# Patient Record
Sex: FEMALE | Race: WHITE | NOT HISPANIC OR LATINO | Employment: UNEMPLOYED | URBAN - METROPOLITAN AREA
[De-identification: names, ages, dates, MRNs, and addresses within clinical notes are randomized per-mention and may not be internally consistent; named-entity substitution may affect disease eponyms.]

---
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
## 2020-05-15 ENCOUNTER — HISTORICAL (OUTPATIENT)
Dept: LAB | Facility: HOSPITAL | Age: 20
End: 2020-05-15

## 2020-08-20 LAB
BILIRUB SERPL-MCNC: NEGATIVE MG/DL
BLOOD URINE, POC: NORMAL
CLARITY, POC UA: NORMAL
COLOR, POC UA: NORMAL
GLUCOSE UR QL STRIP: NEGATIVE
KETONES UR QL STRIP: NEGATIVE
LEUKOCYTE EST, POC UA: NORMAL
NITRITE, POC UA: POSITIVE
PH, POC UA: 7
PROTEIN, POC: NORMAL
SPECIFIC GRAVITY, POC UA: 1.02
UROBILINOGEN, POC UA: NORMAL

## 2020-12-01 ENCOUNTER — HISTORICAL (OUTPATIENT)
Dept: LAB | Facility: HOSPITAL | Age: 20
End: 2020-12-01

## 2021-06-16 LAB
B-HCG FREE SERPL-ACNC: 65.03 MIU/ML
BILIRUB SERPL-MCNC: NEGATIVE MG/DL
BLOOD URINE, POC: NORMAL
CLARITY, POC UA: CLEAR
COLOR, POC UA: YELLOW
GLUCOSE UR QL STRIP: NEGATIVE
KETONES UR QL STRIP: NORMAL
LEUKOCYTE EST, POC UA: NEGATIVE
NITRITE, POC UA: NEGATIVE
PH, POC UA: 6.5
PROTEIN, POC: NORMAL
SPECIFIC GRAVITY, POC UA: NORMAL
UROBILINOGEN, POC UA: NORMAL

## 2022-04-09 ENCOUNTER — HISTORICAL (OUTPATIENT)
Dept: ADMINISTRATIVE | Facility: HOSPITAL | Age: 22
End: 2022-04-09

## 2022-04-29 VITALS
HEIGHT: 66 IN | DIASTOLIC BLOOD PRESSURE: 68 MMHG | OXYGEN SATURATION: 98 % | WEIGHT: 112.63 LBS | BODY MASS INDEX: 18.1 KG/M2 | SYSTOLIC BLOOD PRESSURE: 102 MMHG

## 2022-04-30 ENCOUNTER — HISTORICAL (OUTPATIENT)
Dept: ADMINISTRATIVE | Facility: HOSPITAL | Age: 22
End: 2022-04-30

## 2022-05-02 NOTE — HISTORICAL [facility]
This is a historical note converted from [facility]. Formatting and pictures may have been removed.  Please reference Carter for original formatting and attached multimedia. Chief Complaint  +upt w/ bleeding. new patient  History of Present Illness  21yo WF is a new patient in today for +upt w/ bleeding.  HCG on 06/14/21 of 75.68.  U/s stated Pregnancy of unknown location. Recommend follow-up beta hcg W/ Ultrasound as needed.  Patient started bleeding 06/13 to present. lmp: 04/07/21  Gynecological History  Last Menstrual Period: 04/07/21  Last Menstrual Period: 04/07/21  Menstrual Status Intake: Menarcheal  Menstrual Status Intake: Menarcheal  STIs/STDs: No  Abnormal Pap: No  Current Birth Control Method: Pregnant  Dyspareunia: No  Postcoital Bleeding: No  Dysuria: No  Discharge OB: none reported  Urinary Incontinence: none reported  Sexually Active: Yes  Review of Systems  General/Constitutional:  Chills?denies. Fatigue/weakness?denies?. Fever?denies?. Night sweats?denies?..?  Gastrointestinal:  Abdominal pain?denies?. Blood in stool?denies?. Constipation?denies?. Diarrhea?denies?. Heartburn?denies?. Nausea?denies?. Vomiting?denies?  Genitourinary:  Incontinence?denies?. Blood in urine?denies. Frequent urination?denies?. Painful urination?denies.  Gynecologic:  Irregular menses?admits. ?Heavy bleeding ?denies. ?Painful menses?denies. ?Vaginal discharge?denies?. Vaginal odor?denies. Vaginal lesion?denies. ?Pelvic pain?denies?. Decreased libido?denies. Vulvar lesion?denies?. Prolapse of genital organs?denies?. Painful intercourse?denies?.  Psychiatric:  Depression?denies. Anxiety?denies  Physical Exam  Vitals & Measurements  T:?36.7? ?C (Temporal Artery)? BP:?126/66?  HT:?167.00?cm? WT:?50.600?kg? BMI:?18.14? LMP:?04/07/2021 00:00 CDT? LMP:?04/07/2021 00:00 CDT?  General Exam:  ?  General Appearance:?alert, in no acute distress, normal, well nourished.  Psych:  Orientation:?time, place,  person.  Mood/Affect:?Normal?  Abdomen:  ?-?Soft.?Non-tender. ?No rebound tenderness or guardingNo masses. ? Liver?normal. ?Spleen?normal. ?No hernia palpable.  Pelvis:?  ?- Vulva:?Normal?  ?- Perianal skin:?Normal?  ?- Urethra:?Normal meatus. Q-tip:?Not performed?  ?- Vagina: ?NormalVaginal discharge present:?.?Cystocele:?Absent. ?Rectocele:?Absent?small blood present in vault  ?- Cervix:?Normal. Cervical motion tenderness?Absent?  ?- Uterus: Size -?Normal,?8 week size. ?Mobile.?Non-tender.?  ?- Adnexal:?Normal  ?  Assessment/Plan  1.?Threatened ?O20.0  ?Repeat HCG today  Discussed diagnosis of threatened ?with patient  Discussed contraception options, pt declines at this time. ?  ?  RTC 1 week for F/U HCG   OB History  Pregnancy History???(0,0,0,0)?? ??  ?  ??No previous pregnancies history have been recorded  Problem List/Past Medical History  Ongoing  Anxiety disorder  Sinus tachycardia by electrocardiogram  Threatened   Historical  No qualifying data  Medications  ondansetron 4 mg oral tablet, disintegrating, 4 mg= 1 tab(s), Oral, q8hr, PRN,? ?Not taking  Prenatal 1 Plus 1 (Pt. Own)  Allergies  No Known Allergies  Social History  Abuse/Neglect  No, No, Yes, 2021  No, 2021  Alcohol  Never, 2021  Sexual  Sexually active: Yes. Sexual orientation: Straight or heterosexual. Gender Identity Identifies as female., 2021  Substance Use  Never, 2021  Tobacco  Never (less than 100 in lifetime), No, 2021  Marital Status  Single  Family History  Primary malignant neoplasm of breast: Aunt.  Health Maintenance  Health Maintenance  ???Pending?(in the next year)  ??? ??OverDue  ??? ? ? ?Influenza Vaccine due??10/01/20??and every 1??day(s)  ??? ? ? ?TB Skin Test due??20??and every 1??year(s)  ??? ? ? ?Alcohol Misuse Screening due??21??and every 1??year(s)  ??? ??Due?  ??? ? ? ?Depression Screening due??21??Unknown Frequency  ??? ? ? ?Tetanus  Vaccine due??06/16/21??and every 10??year(s)  ??? ??Due In Future?  ??? ? ? ?ADL Screening not due until??08/20/21??and every 1??year(s)  ??? ? ? ?Obesity Screening not due until??01/01/22??and every 1??year(s)  ???Satisfied?(in the past 1 year)  ??? ??Satisfied?  ??? ? ? ?ADL Screening on??08/20/20.??Satisfied by Olivia Long  ??? ? ? ?Blood Pressure Screening on??06/16/21.??Satisfied by Nando Dennison  ??? ? ? ?Body Mass Index Check on??06/16/21.??Satisfied by Nando Dennison  ??? ? ? ?Obesity Screening on??06/16/21.??Satisfied by Nando Dennison  ?

## 2022-07-23 ENCOUNTER — HISTORICAL (OUTPATIENT)
Dept: ADMINISTRATIVE | Facility: HOSPITAL | Age: 22
End: 2022-07-23

## 2022-07-23 LAB
ABO + RH BLD: NORMAL
C TRACH DNA SPEC QL NAA+PROBE: NEGATIVE
N GONORRHOEA DNA SPEC QL NAA+PROBE: NEGATIVE
TRICHOMONAS: NEGATIVE

## 2022-07-25 LAB
HBV SURFACE AG SERPL QL IA: NONREACTIVE
HCT VFR BLD AUTO: 36.2 % (ref 36–46)
HCV AB SERPL QL IA: NEGATIVE
HEMOGLOBIN BANDS: NORMAL
HGB BLD-MCNC: 12.8 G/DL (ref 12–16)
HIV 1+2 AB+HIV1 P24 AG SERPL QL IA: NONREACTIVE
MCV RBC AUTO: 86.6 FL (ref 82–108)
PLATELET # BLD AUTO: 213 K/UL (ref 150–399)
T PALLIDUM AB SER QL: NONREACTIVE

## 2022-09-16 ENCOUNTER — HISTORICAL (OUTPATIENT)
Dept: ADMINISTRATIVE | Facility: HOSPITAL | Age: 22
End: 2022-09-16

## 2022-10-04 LAB
GLUCOSE SERPL-MCNC: 161 MG/DL
RUBELLA IMMUNE STATUS: NORMAL
URINE CULTURE, ROUTINE: NEGATIVE
VARICELLA ANTIBODY, IGG, CSF: NORMAL

## 2022-10-11 LAB
GLUCOSE, 1 HOUR: 185 MG/DL
GLUCOSE, 2 HOUR: 178 MG/DL
GLUCOSE, 3 HOUR: 144 MG/DL
GLUCOSE, FASTING: 82 MG/DL

## 2022-10-25 ENCOUNTER — HISTORICAL (OUTPATIENT)
Dept: ADMINISTRATIVE | Facility: HOSPITAL | Age: 22
End: 2022-10-25

## 2022-11-28 LAB
HCT VFR BLD AUTO: 29.6 % (ref 36–46)
HGB BLD-MCNC: 10.3 G/DL (ref 12–16)
MCV RBC AUTO: 91.1 FL (ref 82–108)
PLATELET # BLD AUTO: 195 K/UL (ref 150–399)

## 2023-01-12 VITALS — BODY MASS INDEX: 20.8 KG/M2 | WEIGHT: 127.88 LBS | SYSTOLIC BLOOD PRESSURE: 112 MMHG | DIASTOLIC BLOOD PRESSURE: 72 MMHG

## 2023-01-12 PROBLEM — F41.9 ANXIETY DISORDER: Status: ACTIVE | Noted: 2023-01-12

## 2023-01-12 PROBLEM — O20.0 THREATENED ABORTION: Status: ACTIVE | Noted: 2023-01-12

## 2023-01-12 PROBLEM — R00.0 SINUS TACHYCARDIA BY ELECTROCARDIOGRAPHY: Status: ACTIVE | Noted: 2023-01-12

## 2023-01-12 RX ORDER — DIMENHYDRINATE 50 MG
50 TABLET ORAL NIGHTLY PRN
COMMUNITY
End: 2023-04-26

## 2023-01-24 ENCOUNTER — ROUTINE PRENATAL (OUTPATIENT)
Dept: OBSTETRICS AND GYNECOLOGY | Facility: CLINIC | Age: 23
End: 2023-01-24
Payer: MEDICAID

## 2023-01-24 VITALS
SYSTOLIC BLOOD PRESSURE: 126 MMHG | WEIGHT: 130.31 LBS | DIASTOLIC BLOOD PRESSURE: 70 MMHG | BODY MASS INDEX: 21.19 KG/M2

## 2023-01-24 DIAGNOSIS — O24.410 DIET CONTROLLED GESTATIONAL DIABETES MELLITUS (GDM) IN THIRD TRIMESTER: Primary | ICD-10-CM

## 2023-01-24 DIAGNOSIS — Z3A.33 33 WEEKS GESTATION OF PREGNANCY: ICD-10-CM

## 2023-01-24 LAB
BILIRUB SERPL-MCNC: NORMAL MG/DL
BLOOD URINE, POC: NORMAL
CLARITY, POC UA: NORMAL
COLOR, POC UA: YELLOW
GLUCOSE UR QL STRIP: NEGATIVE
KETONES UR QL STRIP: NORMAL
LEUKOCYTE ESTERASE URINE, POC: NEGATIVE
NITRITE, POC UA: NEGATIVE
PH, POC UA: NORMAL
PROTEIN, POC: NEGATIVE
SPECIFIC GRAVITY, POC UA: NORMAL
UROBILINOGEN, POC UA: NORMAL

## 2023-01-24 PROCEDURE — 99214 PR OFFICE/OUTPT VISIT, EST, LEVL IV, 30-39 MIN: ICD-10-PCS | Mod: TH,,, | Performed by: OBSTETRICS & GYNECOLOGY

## 2023-01-24 PROCEDURE — 99214 OFFICE O/P EST MOD 30 MIN: CPT | Mod: TH,,, | Performed by: OBSTETRICS & GYNECOLOGY

## 2023-01-24 RX ORDER — LANCETS 30 GAUGE
EACH MISCELLANEOUS
Status: ON HOLD | COMMUNITY
Start: 2022-10-14 | End: 2023-03-08 | Stop reason: HOSPADM

## 2023-01-24 RX ORDER — BLOOD-GLUCOSE METER
EACH MISCELLANEOUS
Status: ON HOLD | COMMUNITY
Start: 2022-10-14 | End: 2023-03-08 | Stop reason: HOSPADM

## 2023-01-24 RX ORDER — ONDANSETRON 4 MG/1
4 TABLET, ORALLY DISINTEGRATING ORAL EVERY 6 HOURS PRN
COMMUNITY
Start: 2022-08-01 | End: 2023-01-24 | Stop reason: SDUPTHER

## 2023-01-24 RX ORDER — CALCIUM CITRATE/VITAMIN D3 200MG-6.25
1 TABLET ORAL 4 TIMES DAILY
COMMUNITY
Start: 2022-10-14 | End: 2023-02-23 | Stop reason: SDUPTHER

## 2023-01-24 NOTE — PROGRESS NOTES
HPI  22 y.o.  at 33w1d here for OB check.  DXT LOG: WNL expect  and  PP dinner-140,146.  Pt had peanut butter and jelly sandwich and boudin egg roll those days.      ROS  Review of Systems   Constitutional:  Negative for chills and fever.   Gastrointestinal:  Negative for abdominal pain, constipation and diarrhea.   Genitourinary:  Negative for flank pain, genital sores, pelvic pain, urgency, vaginal bleeding, vaginal discharge, vaginal pain, postcoital bleeding and vaginal odor.       OBJECTIVE  /70   Wt 59.1 kg (130 lb 4.7 oz)   LMP 2022   BMI 21.19 kg/m²     Gen: No distress  Abdomen: Gravid, non-tender  Extremities: No edema    FHT:130  FH:31cm    ASSESSMENT  1. Diet controlled gestational diabetes mellitus (GDM) in third trimester    2. 33 weeks gestation of pregnancy  - POCT URINE DIPSTICK WITHOUT MICROSCOPE        PLAN  Reviewed standard labor unit and kick count precautions.  Discussed pre-eclampsia precautions.  Discussed COVID-19 risks, social distancing, and isolation if respiratory symptoms develop.    Cont DXT log, ADA diet. Discussed dietary compliance    RTC 2 weeks

## 2023-02-06 ENCOUNTER — PATIENT MESSAGE (OUTPATIENT)
Dept: OTHER | Facility: OTHER | Age: 23
End: 2023-02-06
Payer: MEDICAID

## 2023-02-13 ENCOUNTER — ROUTINE PRENATAL (OUTPATIENT)
Dept: OBSTETRICS AND GYNECOLOGY | Facility: CLINIC | Age: 23
End: 2023-02-13
Payer: MEDICAID

## 2023-02-13 VITALS — WEIGHT: 136 LBS | DIASTOLIC BLOOD PRESSURE: 76 MMHG | BODY MASS INDEX: 22.12 KG/M2 | SYSTOLIC BLOOD PRESSURE: 126 MMHG

## 2023-02-13 DIAGNOSIS — O24.410 DIET CONTROLLED GESTATIONAL DIABETES MELLITUS (GDM) IN THIRD TRIMESTER: Primary | ICD-10-CM

## 2023-02-13 DIAGNOSIS — Z3A.36 36 WEEKS GESTATION OF PREGNANCY: ICD-10-CM

## 2023-02-13 PROBLEM — O24.419 GESTATIONAL DIABETES MELLITUS (GDM) IN THIRD TRIMESTER: Status: ACTIVE | Noted: 2023-02-13

## 2023-02-13 PROCEDURE — 99499 UNLISTED E&M SERVICE: CPT | Mod: ,,, | Performed by: OBSTETRICS & GYNECOLOGY

## 2023-02-13 PROCEDURE — 99499 POCT URINALYSIS, DIPSTICK, AUTOMATED, W/O SCOPE: ICD-10-PCS | Mod: ,,, | Performed by: OBSTETRICS & GYNECOLOGY

## 2023-02-13 PROCEDURE — 99214 OFFICE O/P EST MOD 30 MIN: CPT | Mod: TH,,, | Performed by: OBSTETRICS & GYNECOLOGY

## 2023-02-13 PROCEDURE — 99214 PR OFFICE/OUTPT VISIT, EST, LEVL IV, 30-39 MIN: ICD-10-PCS | Mod: TH,,, | Performed by: OBSTETRICS & GYNECOLOGY

## 2023-02-13 PROCEDURE — 87653 STREP B DNA AMP PROBE: CPT | Performed by: OBSTETRICS & GYNECOLOGY

## 2023-02-13 NOTE — PROGRESS NOTES
HPI  22 y.o.  at 36w0d here for OB check, pre admit.  Doing well with diet.       DXT LOG:  Fastin-81  2hr PP breakfast:   2hr PP lunch:  2hr PP dinner:        ROS  Review of Systems   Constitutional:  Negative for chills and fever.   Gastrointestinal:  Negative for abdominal pain, constipation and diarrhea.   Genitourinary:  Negative for flank pain, genital sores, pelvic pain, urgency, vaginal bleeding, vaginal discharge, vaginal pain, postcoital bleeding and vaginal odor.       OBJECTIVE  /76   Wt 61.7 kg (136 lb 0.4 oz)   LMP 2022   BMI 22.12 kg/m²     Gen: No distress  Abdomen: Gravid, non-tender  Extremities: No edema  Cervix: 0/0-3  FHT: 135  FH:36cm      ASSESSMENT  1. Diet controlled gestational diabetes mellitus (GDM) in third trimester    2. 36 weeks gestation of pregnancy  - POCT Urinalysis, Dipstick, Automated, W/O Scope  - Strep Group B by PCR; Future  - Strep Group B by PCR        PLAN  Reviewed standard labor unit and kick count precautions.  Discussed pre-eclampsia precautions.  Discussed COVID-19 risks, social distancing, and isolation if respiratory symptoms develop.      Discussed delivery process and plan.  Consent given for delivery.   Discussed labor unit, kick count, pre-eclampsia, rupture   membrane precautions.   GBS    Cont DXT log, ADA diet. Discussed dietary compliance    RTC 1 week ob check, growth scan

## 2023-02-15 LAB — STREP B PCR (OHS): NOT DETECTED

## 2023-02-23 ENCOUNTER — ROUTINE PRENATAL (OUTPATIENT)
Dept: OBSTETRICS AND GYNECOLOGY | Facility: CLINIC | Age: 23
End: 2023-02-23
Payer: MEDICAID

## 2023-02-23 VITALS
BODY MASS INDEX: 22.23 KG/M2 | DIASTOLIC BLOOD PRESSURE: 70 MMHG | WEIGHT: 136.69 LBS | SYSTOLIC BLOOD PRESSURE: 118 MMHG

## 2023-02-23 DIAGNOSIS — O24.410 DIET CONTROLLED GESTATIONAL DIABETES MELLITUS (GDM) IN THIRD TRIMESTER: Primary | ICD-10-CM

## 2023-02-23 DIAGNOSIS — Z3A.37 37 WEEKS GESTATION OF PREGNANCY: ICD-10-CM

## 2023-02-23 LAB
BILIRUB UR QL STRIP: NORMAL
BILIRUB UR QL STRIP: NORMAL
GLUCOSE UR QL STRIP: NEGATIVE
GLUCOSE UR QL STRIP: NORMAL
KETONES UR QL STRIP: NORMAL
KETONES UR QL STRIP: NORMAL
LEUKOCYTE ESTERASE UR QL STRIP: NEGATIVE
LEUKOCYTE ESTERASE UR QL STRIP: NORMAL
PH, POC UA: NORMAL
PH, POC UA: NORMAL
POC BLOOD, URINE: NORMAL
POC BLOOD, URINE: NORMAL
POC NITRATES, URINE: NEGATIVE
POC NITRATES, URINE: NORMAL
PROT UR QL STRIP: NEGATIVE
PROT UR QL STRIP: NORMAL
SP GR UR STRIP: NORMAL (ref 1–1.03)
SP GR UR STRIP: NORMAL (ref 1–1.03)
UROBILINOGEN UR STRIP-ACNC: NORMAL (ref 0.3–2.2)
UROBILINOGEN UR STRIP-ACNC: NORMAL (ref 0.3–2.2)

## 2023-02-23 PROCEDURE — 76816 PR  US,PREGNANT UTERUS,F/U,TRANSABD APP: ICD-10-PCS | Mod: ,,, | Performed by: OBSTETRICS & GYNECOLOGY

## 2023-02-23 PROCEDURE — 76816 OB US FOLLOW-UP PER FETUS: CPT | Mod: ,,, | Performed by: OBSTETRICS & GYNECOLOGY

## 2023-02-23 PROCEDURE — 76819 PR US, OB, FETAL BIOPHYSICAL, W/O NST: ICD-10-PCS | Mod: ,,, | Performed by: OBSTETRICS & GYNECOLOGY

## 2023-02-23 PROCEDURE — 99213 PR OFFICE/OUTPT VISIT, EST, LEVL III, 20-29 MIN: ICD-10-PCS | Mod: 25,TH,, | Performed by: OBSTETRICS & GYNECOLOGY

## 2023-02-23 PROCEDURE — 76819 FETAL BIOPHYS PROFIL W/O NST: CPT | Mod: ,,, | Performed by: OBSTETRICS & GYNECOLOGY

## 2023-02-23 PROCEDURE — 99213 OFFICE O/P EST LOW 20 MIN: CPT | Mod: 25,TH,, | Performed by: OBSTETRICS & GYNECOLOGY

## 2023-02-23 RX ORDER — CALCIUM CITRATE/VITAMIN D3 200MG-6.25
1 TABLET ORAL 4 TIMES DAILY
Qty: 120 STRIP | Refills: 1 | Status: ON HOLD | OUTPATIENT
Start: 2023-02-23 | End: 2023-03-08 | Stop reason: HOSPADM

## 2023-02-23 NOTE — PROGRESS NOTES
HPI  22 y.o.  at 37w3d here for OB check, growth scan.      ROS  Review of Systems   Constitutional:  Negative for chills and fever.   Gastrointestinal:  Negative for abdominal pain, constipation and diarrhea.   Genitourinary:  Negative for flank pain, genital sores, pelvic pain, urgency, vaginal bleeding, vaginal discharge, vaginal pain, postcoital bleeding and vaginal odor.       OBJECTIVE  /70   Wt 62 kg (136 lb 11 oz)   LMP 2022   BMI 22.23 kg/m²     Gen: No distress  Abdomen: Gravid, non-tender  Extremities: No edema  Cervix:   FHT:150s  FH:37cm    BPD: 38w3d, 88%tile  HC: 39w3d, 74%tile  AC: 35w4d, 15%tile  FL: 36w5d, 32%tile   EFW: 2968g, 36%tile  BPP:   LUIS ANGEL: 15.0    ASSESSMENT  1. Diet controlled gestational diabetes mellitus (GDM) in third trimester    2. 37 weeks gestation of pregnancy  - US OB/GYN Procedure (Viewpoint) - Extended List - Future; Future  - US OB/GYN Procedure (Viewpoint) - Extended List - Future  - POCT Urinalysis, Dipstick, Automated, W/O Scope        PLAN    Reviewed standard labor unit and kick count precautions.  Discussed pre-eclampsia precautions.  Discussed COVID-19 risks, social distancing, and isolation if respiratory symptoms develop.    Growth scan today  DXT log WNL   Cont DXT log, ADA diet.   RTC 1 week

## 2023-02-24 ENCOUNTER — TELEPHONE (OUTPATIENT)
Dept: OBSTETRICS AND GYNECOLOGY | Facility: CLINIC | Age: 23
End: 2023-02-24
Payer: MEDICAID

## 2023-02-24 ENCOUNTER — HOSPITAL ENCOUNTER (OUTPATIENT)
Facility: HOSPITAL | Age: 23
Discharge: HOME | End: 2023-02-24
Attending: OBSTETRICS & GYNECOLOGY | Admitting: OBSTETRICS & GYNECOLOGY
Payer: MEDICAID

## 2023-02-24 VITALS — SYSTOLIC BLOOD PRESSURE: 125 MMHG | HEART RATE: 116 BPM | DIASTOLIC BLOOD PRESSURE: 81 MMHG

## 2023-02-24 DIAGNOSIS — O47.9 IRREGULAR UTERINE CONTRACTIONS: ICD-10-CM

## 2023-02-24 LAB
APPEARANCE UR: CLEAR
BACTERIA #/AREA URNS AUTO: ABNORMAL /HPF
BILIRUB UR QL STRIP.AUTO: NEGATIVE MG/DL
COLOR UR AUTO: YELLOW
GLUCOSE UR QL STRIP.AUTO: NEGATIVE MG/DL
KETONES UR QL STRIP.AUTO: ABNORMAL MG/DL
LEUKOCYTE ESTERASE UR QL STRIP.AUTO: ABNORMAL UNIT/L
NITRITE UR QL STRIP.AUTO: NEGATIVE
PH UR STRIP.AUTO: 7 [PH]
PROT UR QL STRIP.AUTO: NEGATIVE MG/DL
RBC #/AREA URNS AUTO: ABNORMAL /HPF
RBC UR QL AUTO: ABNORMAL UNIT/L
SP GR UR STRIP.AUTO: <=1.005
SQUAMOUS #/AREA URNS AUTO: ABNORMAL /HPF
UROBILINOGEN UR STRIP-ACNC: 0.2 MG/DL
WBC #/AREA URNS AUTO: ABNORMAL /HPF

## 2023-02-24 PROCEDURE — 81001 URINALYSIS AUTO W/SCOPE: CPT | Performed by: OBSTETRICS & GYNECOLOGY

## 2023-02-24 PROCEDURE — 87088 URINE BACTERIA CULTURE: CPT | Performed by: OBSTETRICS & GYNECOLOGY

## 2023-02-24 RX ORDER — BUTORPHANOL TARTRATE 1 MG/ML
1 INJECTION INTRAMUSCULAR; INTRAVENOUS ONCE
Status: DISCONTINUED | OUTPATIENT
Start: 2023-02-24 | End: 2023-02-24 | Stop reason: HOSPADM

## 2023-02-24 RX ORDER — HYDROXYZINE PAMOATE 50 MG/1
50 CAPSULE ORAL ONCE
Status: DISCONTINUED | OUTPATIENT
Start: 2023-02-24 | End: 2023-02-24 | Stop reason: HOSPADM

## 2023-02-24 NOTE — TELEPHONE ENCOUNTER
Patient called c/o contractions lasting 30 seconds occurring every 6-7 minutes. Advised patient to go to  for evaluation. Patient voiced understanding.

## 2023-02-24 NOTE — PROGRESS NOTES
Observation status note-  Patient is a 22-year-old white female  2 para 0 AB1 at 37.4 weeks gestation who presented to labor and delivery complaining of passage of mucus plug plus irregular contractions.  She is been observed for 2 hours in labor and delivery.  She is having contractions at 2-4 minutes intervals which are mild to moderate in intensity.  Her only prenatal problem has been gestational diabetes.  The gestational diabetes has been diet controlled.  At present fetal heart tones are in the 150s with good mtcb-po-smpk variability.  Her cervical exam on admission and after 2 hours of observation remains long and fingertip.  Nitrazine test is negative.Patient is somewhat uncomfortable with the contractions.  We will give her 1 mg of stadol IM and 50 mg of Vistaril by mouth.  We will recheck her in an hour.  If there has been no change in her cervix, she can go home.  Impression-intrauterine pregnancy at 37.4 weeks gestation with probable O'Brien Cunningham contractions.  Plan okay to discharge home on labor precautions.

## 2023-02-24 NOTE — NURSING
PT PRESENTED TO  ON 2/24/23 AT 0950 COMPLAINING OF CONTRACTIONS 5 MINUTES APART STARTING AT 0600. PT WAS PLACED ON EFM, CERVICAL EXAM WAS PERFORMED AND UA COLLECTED. CERVIX 1/30/-2.     PT RECHECKED AT 1145, NO CERVICAL CHANGE NOTED    DR SINGER AT BEDSIDE AT 1215. NEW ORDERS NOTED.     Pt states she is not feeling her contractions anymore and would like to be discharged, pt refuses pain medication ordered. Pt discharged and ambulated out in no distress at 1240.

## 2023-02-26 ENCOUNTER — HOSPITAL ENCOUNTER (OUTPATIENT)
Facility: HOSPITAL | Age: 23
Discharge: HOME | End: 2023-02-26
Attending: OBSTETRICS & GYNECOLOGY | Admitting: OBSTETRICS & GYNECOLOGY
Payer: MEDICAID

## 2023-02-26 VITALS
RESPIRATION RATE: 18 BRPM | TEMPERATURE: 99 F | SYSTOLIC BLOOD PRESSURE: 102 MMHG | HEART RATE: 120 BPM | DIASTOLIC BLOOD PRESSURE: 68 MMHG

## 2023-02-26 DIAGNOSIS — Z36.89 ENCOUNTER FOR TRIAGE IN PREGNANT PATIENT: ICD-10-CM

## 2023-02-26 LAB
APPEARANCE UR: ABNORMAL
BACTERIA #/AREA URNS AUTO: ABNORMAL /HPF
BACTERIA UR CULT: NO GROWTH
BILIRUB UR QL STRIP.AUTO: NEGATIVE MG/DL
COLOR UR AUTO: YELLOW
GLUCOSE UR QL STRIP.AUTO: NEGATIVE MG/DL
KETONES UR QL STRIP.AUTO: NEGATIVE MG/DL
LEUKOCYTE ESTERASE UR QL STRIP.AUTO: ABNORMAL UNIT/L
NITRITE UR QL STRIP.AUTO: NEGATIVE
PH UR STRIP.AUTO: 6.5 [PH]
PROT UR QL STRIP.AUTO: NEGATIVE MG/DL
RBC #/AREA URNS AUTO: ABNORMAL /HPF
RBC UR QL AUTO: ABNORMAL UNIT/L
SP GR UR STRIP.AUTO: 1.01
SQUAMOUS #/AREA URNS AUTO: ABNORMAL /HPF
UROBILINOGEN UR STRIP-ACNC: 0.2 MG/DL
WBC #/AREA URNS AUTO: ABNORMAL /HPF

## 2023-02-26 PROCEDURE — 59025 FETAL NON-STRESS TEST: CPT

## 2023-02-26 PROCEDURE — 81001 URINALYSIS AUTO W/SCOPE: CPT | Performed by: OBSTETRICS & GYNECOLOGY

## 2023-02-26 PROCEDURE — 99211 OFF/OP EST MAY X REQ PHY/QHP: CPT

## 2023-02-26 PROCEDURE — 87077 CULTURE AEROBIC IDENTIFY: CPT | Performed by: OBSTETRICS & GYNECOLOGY

## 2023-02-26 NOTE — DISCHARGE INSTRUCTIONS
keep scheduled follow up appointment with provider. return to hospital if you do not feel the baby move, water breaks, vaginal bleeding, or contractions are 5 minutes apart lasting for an hour.

## 2023-02-26 NOTE — NURSING
Presented to unit with complaint of possible rupture of membranes. States when she woke up this morning that she noticed liquid down her leg and when she wiped it was light yellow. Denies pain and bleeding. Placed in exam room on continuous efm and toco. Vital signs, ua, and nitrazine obtained. Sve performed. Call bell within reach and side rails up x2. Dr Mayes notified of status, instructed to monitor patient for 1 hr, if ua and nst good, then discharge with labor precautions and instructions to keep scheduled follow up appointment.

## 2023-02-28 ENCOUNTER — ROUTINE PRENATAL (OUTPATIENT)
Dept: OBSTETRICS AND GYNECOLOGY | Facility: CLINIC | Age: 23
End: 2023-02-28
Payer: MEDICAID

## 2023-02-28 VITALS
SYSTOLIC BLOOD PRESSURE: 126 MMHG | BODY MASS INDEX: 21.84 KG/M2 | WEIGHT: 134.25 LBS | DIASTOLIC BLOOD PRESSURE: 70 MMHG

## 2023-02-28 DIAGNOSIS — Z3A.38 38 WEEKS GESTATION OF PREGNANCY: ICD-10-CM

## 2023-02-28 DIAGNOSIS — O24.410 DIET CONTROLLED GESTATIONAL DIABETES MELLITUS (GDM) IN THIRD TRIMESTER: Primary | ICD-10-CM

## 2023-02-28 LAB — BACTERIA UR CULT: ABNORMAL

## 2023-02-28 PROCEDURE — 76819 PR US, OB, FETAL BIOPHYSICAL, W/O NST: ICD-10-PCS | Mod: ,,, | Performed by: OBSTETRICS & GYNECOLOGY

## 2023-02-28 PROCEDURE — 99213 PR OFFICE/OUTPT VISIT, EST, LEVL III, 20-29 MIN: ICD-10-PCS | Mod: 25,TH,, | Performed by: OBSTETRICS & GYNECOLOGY

## 2023-02-28 PROCEDURE — 76819 FETAL BIOPHYS PROFIL W/O NST: CPT | Mod: ,,, | Performed by: OBSTETRICS & GYNECOLOGY

## 2023-02-28 PROCEDURE — 99213 OFFICE O/P EST LOW 20 MIN: CPT | Mod: 25,TH,, | Performed by: OBSTETRICS & GYNECOLOGY

## 2023-02-28 RX ORDER — LANCETS 30 GAUGE
EACH MISCELLANEOUS
Qty: 200 EACH | OUTPATIENT
Start: 2023-02-28

## 2023-02-28 NOTE — PROGRESS NOTES
HPI  22 y.o.  at 38w1d here for OB check.   Reports unable to check sugar due to pharmacy not refilling lancets.   Desires induction at 39 weeks    ROS  Review of Systems   Constitutional:  Negative for chills and fever.   Gastrointestinal:  Negative for abdominal pain, constipation and diarrhea.   Genitourinary:  Negative for flank pain, genital sores, pelvic pain, urgency, vaginal bleeding, vaginal discharge, vaginal pain, postcoital bleeding and vaginal odor.       OBJECTIVE  /70   Wt 60.9 kg (134 lb 4.2 oz)   LMP 2022   BMI 21.84 kg/m²     Gen: No distress  Abdomen: Gravid, non-tender  Extremities: No edema  Cervix: 1/0/-3  FHT:140s  FH:38cm  BPP: 8/8  LUIS ANGEL: 15.8cm    ASSESSMENT  1. Diet controlled gestational diabetes mellitus (GDM) in third trimester    2. 38 weeks gestation of pregnancy  - POCT Urinalysis, Dipstick, Automated, W/O Scope  - US OB/GYN Procedure (Viewpoint) - Extended List - Future; Future  - US OB/GYN Procedure (Viewpoint) - Extended List - Future        PLAN  Cont DXT log, ADA diet. Discussed dietary compliance.   Call pharmacy, no reason given for denial of lancets.  Verbal order given to pharmacy for refill, and apparently it was filled.   Reviewed standard labor unit and kick count precautions.  Discussed pre-eclampsia precautions.  Discussed COVID-19 risks, social distancing, and isolation if respiratory symptoms develop.   Desires delivery at 39 weeks for social indication. Report to [facility] Monday at 39 weeks.   RTC post partum

## 2023-02-28 NOTE — TELEPHONE ENCOUNTER
----- Message from Yamileth Persaud sent at 2/28/2023  1:06 PM CST -----  Regarding: Nurse advice  Patient called- she is unsure what time she needs to be to the hospital Sunday night to be induced on Monday. #[phone]

## 2023-02-28 NOTE — TELEPHONE ENCOUNTER
----- Message from Yamileth Persaud sent at 2/28/2023  1:06 PM CST -----  Regarding: Nurse advice  Patient called- she is unsure what time she needs to be to the hospital Sunday night to be induced on Monday. #[phone]

## 2023-03-06 ENCOUNTER — HOSPITAL ENCOUNTER (INPATIENT)
Facility: HOSPITAL | Age: 23
LOS: 2 days | Discharge: HOME | End: 2023-03-08
Attending: OBSTETRICS & GYNECOLOGY | Admitting: OBSTETRICS & GYNECOLOGY
Payer: MEDICAID

## 2023-03-06 ENCOUNTER — ANESTHESIA (OUTPATIENT)
Dept: OBSTETRICS AND GYNECOLOGY | Facility: HOSPITAL | Age: 23
End: 2023-03-06
Payer: MEDICAID

## 2023-03-06 ENCOUNTER — ANESTHESIA EVENT (OUTPATIENT)
Dept: OBSTETRICS AND GYNECOLOGY | Facility: HOSPITAL | Age: 23
End: 2023-03-06
Payer: MEDICAID

## 2023-03-06 DIAGNOSIS — O24.419 GESTATIONAL DIABETES MELLITUS (GDM) IN THIRD TRIMESTER: ICD-10-CM

## 2023-03-06 DIAGNOSIS — R00.0 SINUS TACHYCARDIA BY ELECTROCARDIOGRAPHY: ICD-10-CM

## 2023-03-06 DIAGNOSIS — O47.9 IRREGULAR UTERINE CONTRACTIONS: ICD-10-CM

## 2023-03-06 DIAGNOSIS — Z34.90 PREGNANCY: ICD-10-CM

## 2023-03-06 DIAGNOSIS — O20.0 THREATENED ABORTION: ICD-10-CM

## 2023-03-06 DIAGNOSIS — F41.9 ANXIETY DISORDER: ICD-10-CM

## 2023-03-06 DIAGNOSIS — R00.0 TACHYCARDIA: ICD-10-CM

## 2023-03-06 PROBLEM — O99.340 ANXIETY DURING PREGNANCY: Status: ACTIVE | Noted: 2023-03-06

## 2023-03-06 LAB
ABO AND RH: NORMAL
ANTIBODY SCREEN: NORMAL
BASOPHILS # BLD AUTO: 0.05 X10(3)/MCL (ref 0.01–0.08)
BASOPHILS NFR BLD AUTO: 0.5 % (ref 0.1–1.2)
EOSINOPHIL # BLD AUTO: 0.07 X10(3)/MCL (ref 0.04–0.36)
EOSINOPHIL NFR BLD AUTO: 0.7 % (ref 0.7–7)
ERYTHROCYTE [DISTWIDTH] IN BLOOD BY AUTOMATED COUNT: 13.2 % (ref 11–14.5)
HCT VFR BLD AUTO: 34.1 % (ref 36–48)
HGB BLD-MCNC: 12 G/DL (ref 11.8–16)
IMM GRANULOCYTES # BLD AUTO: 0.02 X10(3)/MCL (ref 0–0.03)
IMM GRANULOCYTES NFR BLD AUTO: 0.2 % (ref 0–0.5)
LYMPHOCYTES # BLD AUTO: 2.6 X10(3)/MCL (ref 1.16–3.74)
LYMPHOCYTES NFR BLD AUTO: 26.9 % (ref 20–55)
MCH RBC QN AUTO: 30.8 PG (ref 27–34)
MCV RBC AUTO: 87.7 FL (ref 79–99)
MEAN CELL HEMOGLOBIN CONCENTRATION (OHS) G/DL: 35.2 G/DL (ref 31–37)
MONOCYTES # BLD AUTO: 0.53 X10(3)/MCL (ref 0.24–0.36)
MONOCYTES NFR BLD AUTO: 5.5 % (ref 4.7–12.5)
NEUTROPHILS # BLD AUTO: 6.4 X10(3)/MCL (ref 1.56–6.13)
NEUTROPHILS NFR BLD AUTO: 66.2 % (ref 37–73)
NRBC BLD AUTO-RTO: 0 % (ref 0–1)
PLATELET # BLD AUTO: 142 X10(3)/MCL (ref 140–371)
PMV BLD AUTO: 11.3 FL (ref 9.4–12.4)
RBC # BLD AUTO: 3.89 X10(6)/MCL (ref 4–5.1)
WBC # SPEC AUTO: 9.7 X10(3)/MCL (ref 4–11.5)

## 2023-03-06 PROCEDURE — 72100002 HC LABOR CARE, 1ST 8 HOURS

## 2023-03-06 PROCEDURE — 62326 NJX INTERLAMINAR LMBR/SAC: CPT | Performed by: NURSE ANESTHETIST, CERTIFIED REGISTERED

## 2023-03-06 PROCEDURE — 63600175 PHARM REV CODE 636 W HCPCS: Performed by: NURSE ANESTHETIST, CERTIFIED REGISTERED

## 2023-03-06 PROCEDURE — 11000001 HC ACUTE MED/SURG PRIVATE ROOM

## 2023-03-06 PROCEDURE — 25000003 PHARM REV CODE 250: Performed by: NURSE ANESTHETIST, CERTIFIED REGISTERED

## 2023-03-06 PROCEDURE — 25000003 PHARM REV CODE 250: Performed by: OBSTETRICS & GYNECOLOGY

## 2023-03-06 PROCEDURE — 36415 COLL VENOUS BLD VENIPUNCTURE: CPT | Performed by: OBSTETRICS & GYNECOLOGY

## 2023-03-06 PROCEDURE — 59409 PRA ETRICAL CARE,VAG DELIV ONLY: ICD-10-PCS | Mod: QZ,,, | Performed by: NURSE ANESTHETIST, CERTIFIED REGISTERED

## 2023-03-06 PROCEDURE — 63600175 PHARM REV CODE 636 W HCPCS: Performed by: OBSTETRICS & GYNECOLOGY

## 2023-03-06 PROCEDURE — 86900 BLOOD TYPING SEROLOGIC ABO: CPT | Performed by: OBSTETRICS & GYNECOLOGY

## 2023-03-06 PROCEDURE — 59409 OBSTETRICAL CARE: CPT | Mod: QZ,,, | Performed by: NURSE ANESTHETIST, CERTIFIED REGISTERED

## 2023-03-06 PROCEDURE — 85025 COMPLETE CBC W/AUTO DIFF WBC: CPT | Performed by: OBSTETRICS & GYNECOLOGY

## 2023-03-06 PROCEDURE — 72100003 HC LABOR CARE, EA. ADDL. 8 HRS

## 2023-03-06 PROCEDURE — 51702 INSERT TEMP BLADDER CATH: CPT

## 2023-03-06 RX ORDER — CARBOPROST TROMETHAMINE 250 UG/ML
250 INJECTION, SOLUTION INTRAMUSCULAR
Status: CANCELLED | OUTPATIENT
Start: 2023-03-06

## 2023-03-06 RX ORDER — METHYLERGONOVINE MALEATE 0.2 MG/ML
200 INJECTION INTRAVENOUS
Status: CANCELLED | OUTPATIENT
Start: 2023-03-06

## 2023-03-06 RX ORDER — TERBUTALINE SULFATE 1 MG/ML
0.25 INJECTION SUBCUTANEOUS
Status: DISCONTINUED | OUTPATIENT
Start: 2023-03-06 | End: 2023-03-07

## 2023-03-06 RX ORDER — MISOPROSTOL 100 UG/1
1000 TABLET ORAL
Status: CANCELLED | OUTPATIENT
Start: 2023-03-06

## 2023-03-06 RX ORDER — OXYTOCIN/RINGER'S LACTATE 30/500 ML
0-30 PLASTIC BAG, INJECTION (ML) INTRAVENOUS CONTINUOUS
Status: DISCONTINUED | OUTPATIENT
Start: 2023-03-06 | End: 2023-03-07

## 2023-03-06 RX ORDER — CIPROFLOXACIN 2 MG/ML
400 INJECTION, SOLUTION INTRAVENOUS ONCE AS NEEDED
Status: DISCONTINUED | OUTPATIENT
Start: 2023-03-06 | End: 2023-03-07

## 2023-03-06 RX ORDER — ROPIVACAINE HYDROCHLORIDE 2 MG/ML
INJECTION, SOLUTION EPIDURAL; INFILTRATION
Status: COMPLETED
Start: 2023-03-06 | End: 2023-03-06

## 2023-03-06 RX ORDER — OXYTOCIN/RINGER'S LACTATE 30/500 ML
95 PLASTIC BAG, INJECTION (ML) INTRAVENOUS ONCE
Status: DISCONTINUED | OUTPATIENT
Start: 2023-03-06 | End: 2023-03-07

## 2023-03-06 RX ORDER — ONDANSETRON 4 MG/1
4 TABLET, ORALLY DISINTEGRATING ORAL EVERY 8 HOURS PRN
Status: DISCONTINUED | OUTPATIENT
Start: 2023-03-06 | End: 2023-03-08 | Stop reason: HOSPADM

## 2023-03-06 RX ORDER — OXYTOCIN 10 [USP'U]/ML
10 INJECTION, SOLUTION INTRAMUSCULAR; INTRAVENOUS ONCE AS NEEDED
Status: DISCONTINUED | OUTPATIENT
Start: 2023-03-06 | End: 2023-03-08 | Stop reason: HOSPADM

## 2023-03-06 RX ORDER — OXYTOCIN/RINGER'S LACTATE 30/500 ML
334 PLASTIC BAG, INJECTION (ML) INTRAVENOUS ONCE
Status: DISCONTINUED | OUTPATIENT
Start: 2023-03-06 | End: 2023-03-07

## 2023-03-06 RX ORDER — LIDOCAINE HCL/EPINEPHRINE/PF 2%-1:200K
VIAL (ML) INJECTION
Status: DISCONTINUED | OUTPATIENT
Start: 2023-03-06 | End: 2023-03-07

## 2023-03-06 RX ORDER — BUPIVACAINE HYDROCHLORIDE 2.5 MG/ML
INJECTION, SOLUTION EPIDURAL; INFILTRATION; INTRACAUDAL
Status: DISPENSED
Start: 2023-03-06 | End: 2023-03-07

## 2023-03-06 RX ORDER — MISOPROSTOL 100 UG/1
1000 TABLET ORAL ONCE AS NEEDED
Status: DISCONTINUED | OUTPATIENT
Start: 2023-03-06 | End: 2023-03-08 | Stop reason: HOSPADM

## 2023-03-06 RX ORDER — LIDOCAINE HYDROCHLORIDE 10 MG/ML
10 INJECTION INFILTRATION; PERINEURAL ONCE AS NEEDED
Status: DISCONTINUED | OUTPATIENT
Start: 2023-03-06 | End: 2023-03-08 | Stop reason: HOSPADM

## 2023-03-06 RX ORDER — ONDANSETRON 2 MG/ML
4 INJECTION INTRAMUSCULAR; INTRAVENOUS EVERY 6 HOURS PRN
Status: DISCONTINUED | OUTPATIENT
Start: 2023-03-06 | End: 2023-03-08 | Stop reason: HOSPADM

## 2023-03-06 RX ORDER — CALCIUM CARBONATE 200(500)MG
500 TABLET,CHEWABLE ORAL 3 TIMES DAILY PRN
Status: DISCONTINUED | OUTPATIENT
Start: 2023-03-06 | End: 2023-03-08 | Stop reason: HOSPADM

## 2023-03-06 RX ORDER — BUTORPHANOL TARTRATE 1 MG/ML
2 INJECTION INTRAMUSCULAR; INTRAVENOUS
Status: CANCELLED | OUTPATIENT
Start: 2023-03-06

## 2023-03-06 RX ORDER — SIMETHICONE 80 MG
1 TABLET,CHEWABLE ORAL 4 TIMES DAILY PRN
Status: DISCONTINUED | OUTPATIENT
Start: 2023-03-06 | End: 2023-03-07

## 2023-03-06 RX ORDER — SODIUM CHLORIDE, SODIUM LACTATE, POTASSIUM CHLORIDE, CALCIUM CHLORIDE 600; 310; 30; 20 MG/100ML; MG/100ML; MG/100ML; MG/100ML
INJECTION, SOLUTION INTRAVENOUS CONTINUOUS
Status: DISCONTINUED | OUTPATIENT
Start: 2023-03-06 | End: 2023-03-07

## 2023-03-06 RX ORDER — CLINDAMYCIN PHOSPHATE 900 MG/50ML
900 INJECTION, SOLUTION INTRAVENOUS ONCE AS NEEDED
Status: DISCONTINUED | OUTPATIENT
Start: 2023-03-06 | End: 2023-03-07

## 2023-03-06 RX ORDER — BUTORPHANOL TARTRATE 1 MG/ML
1 INJECTION INTRAMUSCULAR; INTRAVENOUS
Status: DISCONTINUED | OUTPATIENT
Start: 2023-03-06 | End: 2023-03-07

## 2023-03-06 RX ORDER — MISOPROSTOL 100 MCG
25 TABLET ORAL
Status: DISPENSED | OUTPATIENT
Start: 2023-03-06 | End: 2023-03-06

## 2023-03-06 RX ORDER — METHYLERGONOVINE MALEATE 0.2 MG/ML
200 INJECTION INTRAVENOUS
Status: DISCONTINUED | OUTPATIENT
Start: 2023-03-06 | End: 2023-03-08 | Stop reason: HOSPADM

## 2023-03-06 RX ORDER — SODIUM CHLORIDE 9 MG/ML
INJECTION, SOLUTION INTRAVENOUS
Status: DISCONTINUED | OUTPATIENT
Start: 2023-03-06 | End: 2023-03-07

## 2023-03-06 RX ORDER — CARBOPROST TROMETHAMINE 250 UG/ML
250 INJECTION, SOLUTION INTRAMUSCULAR
Status: DISCONTINUED | OUTPATIENT
Start: 2023-03-06 | End: 2023-03-08 | Stop reason: HOSPADM

## 2023-03-06 RX ORDER — TRANEXAMIC ACID 10 MG/ML
1000 INJECTION, SOLUTION INTRAVENOUS ONCE AS NEEDED
Status: DISCONTINUED | OUTPATIENT
Start: 2023-03-06 | End: 2023-03-08 | Stop reason: HOSPADM

## 2023-03-06 RX ORDER — DIPHENOXYLATE HYDROCHLORIDE AND ATROPINE SULFATE 2.5; .025 MG/1; MG/1
1 TABLET ORAL 4 TIMES DAILY PRN
Status: CANCELLED | OUTPATIENT
Start: 2023-03-06

## 2023-03-06 RX ORDER — ROPIVACAINE HYDROCHLORIDE 2 MG/ML
INJECTION, SOLUTION EPIDURAL; INFILTRATION CONTINUOUS PRN
Status: DISCONTINUED | OUTPATIENT
Start: 2023-03-06 | End: 2023-03-07

## 2023-03-06 RX ORDER — MUPIROCIN 20 MG/G
OINTMENT TOPICAL
Status: CANCELLED | OUTPATIENT
Start: 2023-03-06

## 2023-03-06 RX ORDER — LIDOCAINE HCL/EPINEPHRINE/PF 2%-1:200K
VIAL (ML) INJECTION
Status: COMPLETED
Start: 2023-03-06 | End: 2023-03-06

## 2023-03-06 RX ORDER — OXYTOCIN/RINGER'S LACTATE 30/500 ML
95 PLASTIC BAG, INJECTION (ML) INTRAVENOUS ONCE AS NEEDED
Status: DISCONTINUED | OUTPATIENT
Start: 2023-03-06 | End: 2023-03-08 | Stop reason: HOSPADM

## 2023-03-06 RX ORDER — LIDOCAINE HYDROCHLORIDE AND EPINEPHRINE 15; 5 MG/ML; UG/ML
INJECTION, SOLUTION EPIDURAL
Status: DISCONTINUED | OUTPATIENT
Start: 2023-03-06 | End: 2023-03-07

## 2023-03-06 RX ORDER — ACETAMINOPHEN 325 MG/1
650 TABLET ORAL EVERY 6 HOURS PRN
Status: DISCONTINUED | OUTPATIENT
Start: 2023-03-06 | End: 2023-03-07

## 2023-03-06 RX ORDER — OXYTOCIN/RINGER'S LACTATE 30/500 ML
334 PLASTIC BAG, INJECTION (ML) INTRAVENOUS ONCE AS NEEDED
Status: DISCONTINUED | OUTPATIENT
Start: 2023-03-06 | End: 2023-03-08 | Stop reason: HOSPADM

## 2023-03-06 RX ADMIN — SODIUM CHLORIDE, POTASSIUM CHLORIDE, SODIUM LACTATE AND CALCIUM CHLORIDE 1000 ML: 600; 310; 30; 20 INJECTION, SOLUTION INTRAVENOUS at 12:03

## 2023-03-06 RX ADMIN — MISOPROSTOL 25 MCG: 100 TABLET ORAL at 04:03

## 2023-03-06 RX ADMIN — MISOPROSTOL 25 MCG: 100 TABLET ORAL at 01:03

## 2023-03-06 RX ADMIN — BUTORPHANOL TARTRATE 1 MG: 1 INJECTION, SOLUTION INTRAMUSCULAR; INTRAVENOUS at 12:03

## 2023-03-06 RX ADMIN — ROPIVACAINE HYDROCHLORIDE 10 ML/HR: 2 INJECTION, SOLUTION EPIDURAL; INFILTRATION at 02:03

## 2023-03-06 RX ADMIN — LIDOCAINE HYDROCHLORIDE AND EPINEPHRINE 3 ML: 20; 5 INJECTION, SOLUTION EPIDURAL; INFILTRATION; INTRACAUDAL; PERINEURAL at 08:03

## 2023-03-06 RX ADMIN — LIDOCAINE HYDROCHLORIDE AND EPINEPHRINE 3 ML: 15; 5 INJECTION, SOLUTION EPIDURAL at 01:03

## 2023-03-06 RX ADMIN — LIDOCAINE HYDROCHLORIDE AND EPINEPHRINE 3 ML: 20; 5 INJECTION, SOLUTION EPIDURAL; INFILTRATION; INTRACAUDAL; PERINEURAL at 02:03

## 2023-03-06 RX ADMIN — LIDOCAINE HYDROCHLORIDE AND EPINEPHRINE 2 ML: 20; 5 INJECTION, SOLUTION EPIDURAL; INFILTRATION; INTRACAUDAL; PERINEURAL at 08:03

## 2023-03-06 RX ADMIN — Medication 4 MILLI-UNITS/MIN: at 09:03

## 2023-03-06 RX ADMIN — SODIUM CHLORIDE, POTASSIUM CHLORIDE, SODIUM LACTATE AND CALCIUM CHLORIDE: 600; 310; 30; 20 INJECTION, SOLUTION INTRAVENOUS at 09:03

## 2023-03-06 RX ADMIN — LIDOCAINE HYDROCHLORIDE AND EPINEPHRINE 2 ML: 15; 5 INJECTION, SOLUTION EPIDURAL at 01:03

## 2023-03-06 RX ADMIN — SODIUM CHLORIDE, POTASSIUM CHLORIDE, SODIUM LACTATE AND CALCIUM CHLORIDE 1000 ML: 600; 310; 30; 20 INJECTION, SOLUTION INTRAVENOUS at 09:03

## 2023-03-06 NOTE — H&P
[facility]  History [facility] Physical    Patient Name: Robyn Guerrero  MRN: [id number]  Admission Date: 3/6/2023  Primary Care Provider: Primary Doctor No    Subjective:     Principal Problem:Gestational diabetes mellitus (GDM) in third trimester    History of Present Illness:  22 y.o. female  at 39w0d presented for scheduled induction.  Patient has well controlled, diet controlled GDM.  Fetal membranes ruptured spontaneously at 04:25.  She has received 2 doses cytotec. Normal fetal movement.         Obstetric HPI:  Patient reports Date/time of onset: today contractions, active fetal movement, No vaginal bleeding , Yes loss of fluid     This pregnancy has been complicated by GDM - diet controlled    OB History    Para Term  AB Living   2 0 0 0 1 0   SAB IAB Ectopic Multiple Live Births   0 0 0 0 0      # Outcome Date GA Lbr Nguyễn/2nd Weight Sex Delivery Anes PTL Lv   2 Current            1 AB 21 6w0d    SAB   FD     Past Medical History:   Diagnosis Date    Anxiety disorder, unspecified     Irregular uterine contractions      Past Surgical History:   Procedure Laterality Date    TONSILECTOMY      TYMPANOSTOMY TUBE PLACEMENT         PTA Medications   Medication Sig    dimenhyDRINATE (DRAMAMINE) 50 MG tablet Take 50 mg by mouth nightly as needed.    prenatal vit no.124/iron/folic (PRENATAL VITAMIN ORAL) Take 1 tablet by mouth once daily.    TRUE METRIX GLUCOSE METER Misc USE TO TEST BLOOD SUGAR FOUR TIMES DAILY    TRUE METRIX GLUCOSE TEST STRIP Strp Place 1 strip onto the skin 4 (four) times daily.    ULTRA THIN LANCETS 30 gauge Misc SMARTSIG:Via Meter       Review of patient's allergies indicates:   Allergen Reactions    House dust Hives, Itching, Nausea And Vomiting and Shortness Of Breath        Family History       Problem Relation (Age of Onset)    Lung cancer Maternal Aunt    Uterine cancer Maternal Aunt          Tobacco Use    Smoking status: Never     Smokeless tobacco: Never   Substance and Sexual Activity    Alcohol use: Not Currently    Drug use: Never    Sexual activity: Not Currently     Partners: Male     Review of Systems   Constitutional:  Negative for chills and fever.   Eyes:  Negative for visual disturbance.   Respiratory:  Negative for cough, shortness of breath and wheezing.    Cardiovascular:  Negative for chest pain, palpitations and leg swelling.   Gastrointestinal:  Positive for abdominal pain. Negative for constipation and diarrhea.   Genitourinary:  Negative for dysuria, flank pain, genital sores, pelvic pain, urgency, vaginal bleeding, vaginal discharge, vaginal pain, postcoital bleeding and vaginal odor.   Musculoskeletal:  Negative for back pain and leg pain.   Neurological:  Negative for seizures and headaches.   All other systems reviewed and are negative.   Objective:     Vital Signs (Most Recent):  Temp: 98.1 °F (36.7 °C) (03/06/23 0035)  Pulse: 91 (03/06/23 0035)  Resp: 18 (03/06/23 0035)  BP: 128/78 (03/06/23 0035)  SpO2: 100 % (03/06/23 0035) Vital Signs (24h Range):  Temp:  [98.1 °F (36.7 °C)] 98.1 °F (36.7 °C)  Pulse:  [91] 91  Resp:  [18] 18  SpO2:  [100 %] 100 %  BP: (128)/(78) 128/78     Weight: 61.7 kg (136 lb)  Body mass index is 22.63 kg/m².    FHT: 130 Cat 1 (reassuring)  TOCO:  Q 2-3 minutes    Physical Exam:   Constitutional: She is oriented to person, place, and time. She appears well-developed and well-nourished. No distress.    HENT:   Head: Normocephalic and atraumatic.    Eyes: Pupils are equal, round, and reactive to light. EOM are normal.    Neck: No tracheal deviation present. No thyromegaly present.    Cardiovascular:       Exam reveals no clubbing, no cyanosis and no edema.        Pulmonary/Chest: Effort normal and breath sounds normal.        Abdominal: There is no abdominal tenderness. There is no rebound and no guarding.     Genitourinary:    Vagina and rectum normal.   The external female genitalia was normal.    No external genitalia lesions identified,Genitalia hair distrobution normal .   There is no lesion on the right labia. There is no lesion on the left labia. Cervix is normal. Vagina exhibits no lesion. No  no vaginal discharge, tenderness or bleeding in the vagina. Cervix exhibits no motion tenderness and no tenderness. Uterus size: 39 cm.          Musculoskeletal: Normal range of motion.       Neurological: She is alert and oriented to person, place, and time. She has normal reflexes.    Skin: Skin is warm and dry. No cyanosis. Nails show no clubbing.    Psychiatric: She has a normal mood and affect. Her behavior is normal. Thought content normal.     Cervix:  Dilation:  1  Effacement:  75%  Station: -2  Presentation: Vertex     Significant Labs:  Lab Results   Component Value Date    GROUPTR B POS 2022       I have personallly reviewed all pertinent lab results from the last 24 hours.  Recent Lab Results         23  0027        ABO and RH B POS  Comment: @23 01:11 by [email or web address]:  ABO/RH WAS DONE ON 2022 @2101 BY [facility] IN [facility]       Antibody Screen NEG  Comment: @23 01:35 by [email or web address]:  ABO/RH WAS DONE ON 2022 @2101 BY [facility] IN [facility]       Baso # 0.05       Basophil % 0.5       Eos # 0.07       Eosinophil % 0.7       Hematocrit 34.1       Hemoglobin 12.0       Immature Grans (Abs) 0.02       Immature Granulocytes 0.2       Lymph # 2.60       LYMPH % 26.9       MCH 30.8       MCHC 35.2       MCV 87.7       Mono # 0.53       Mono % 5.5       MPV 11.3       Neut # 6.40       Neut % 66.2       nRBC 0.0       Platelets 142       RBC 3.89       RDW 13.2       WBC 9.7             Assessment/Plan:     22 y.o. female  at 39w0d admitted for elective induction per maternal request.     Active Hospital Problems    Diagnosis  POA    *Gestational diabetes mellitus (GDM) in third trimester [O24.419]  Yes    Anxiety during pregnancy [O99.340, F41.9]  Yes      Resolved Hospital Problems   No resolved  problems to display.     Continuous maternal and fetal monitoring  Monitor Blood pressures. Control severe range pressures with IV meds as needed  Epidural when ready  Pitocin per protocol as needed  IV fluids  Monitor blood sugars  Anticipate vaginal delivery      REGINA MAZARIEGOS MD  Obstetrics  [facility]

## 2023-03-06 NOTE — PROGRESS NOTES
[facility]  Obstetrics  Labor Progress Note    Patient Name: Robyn Guerrero  MRN: [id number]  Admission Date: 3/6/2023  Hospital Length of Stay: 0 days  Attending Physician: Dk Garrett MD  Primary Care Provider: Primary Doctor No    Subjective:     Principal Problem:Gestational diabetes mellitus (GDM) in third trimester    Interval History:  Robyn is a 22 y.o.  at 39w0d. Seen and examined this Am, at 1300, and this evening. She is doing well. Epidural in place. She is progressing.    Objective:     Vital Signs (Most Recent):  Temp: 98.1 °F (36.7 °C) (23)  Pulse: (!) 118 (23 1409)  Resp: 18 (23)  BP: 111/80 (23 1409)  SpO2: 100 % (23)   Vital Signs (24h Range):  Temp:  [98.1 °F (36.7 °C)] 98.1 °F (36.7 °C)  Pulse:  [] 118  Resp:  [18] 18  SpO2:  [100 %] 100 %  BP: (111-128)/(68-87) 111/80     Weight: 61.7 kg (136 lb)  Body mass index is 22.63 kg/m².    FHT: 130 Cat 1 (reassuring)  TOCO:  Q 2-4 minutes    Physical Exam:   Constitutional: No distress.       Cardiovascular:       Exam reveals no edema.        Pulmonary/Chest: Effort normal.        Abdominal: There is no abdominal tenderness.                  Skin: She is not diaphoretic.      Cervical Exam:  Dilation:  5  Effacement:  75%  Station: -2  Presentation: Vertex     Significant Labs:  Lab Results   Component Value Date    GROUPTRH B POS 2022       I have personallly reviewed all pertinent lab results from the last 24 hours.  Recent Lab Results         23  0027        ABO and RH B POS  Comment: @23 01:11 by [email or web address]:  [facility]       Antibody Screen NEG  Comment: @23 01:35 by [email or web address]:  ABO/RH WAS DONE ON 2022 @2101 BY [facility]       Baso # 0.05       Basophil % 0.5       Eos # 0.07       Eosinophil % 0.7       Hematocrit 34.1       Hemoglobin 12.0       Immature Grans (Abs) 0.02       Immature Granulocytes  0.2       Lymph # 2.60       LYMPH % 26.9       MCH 30.8       MCHC 35.2       MCV 87.7       Mono # 0.53       Mono % 5.5       MPV 11.3       Neut # 6.40       Neut % 66.2       nRBC 0.0       Platelets 142       RBC 3.89       RDW 13.2       WBC 9.7               Assessment/Plan:     22 y.o. female  at 39w0d for:    Active Diagnoses:    Diagnosis Date Noted POA    PRINCIPAL PROBLEM:  Gestational diabetes mellitus (GDM) in third trimester [O24.419] 2023 Yes    Anxiety during pregnancy [O99.340, F41.9] 2023 Yes      Problems Resolved During this Admission:       Cont induction  Pitocin per protocol  CMFM  Epidural in place  ASVD    REGINA MAZARIEGOS MD  Obstetrics  [facility]

## 2023-03-06 NOTE — ANESTHESIA PREPROCEDURE EVALUATION
03/06/2023  Robyn Guerrero is a 22 y.o., female.      Pre-op Assessment    I have reviewed the Patient Summary Reports.     I have reviewed the Nursing Notes. I have reviewed the NPO Status.   I have reviewed the Medications.     Review of Systems  Anesthesia Hx:  No problems with previous Anesthesia  Denies Family Hx of Anesthesia complications.   Denies Personal Hx of Anesthesia complications.   Social:  Non-Smoker    Hematology/Oncology:  Hematology Normal   Oncology Normal     EENT/Dental:EENT/Dental Normal   Cardiovascular:  Cardiovascular Normal Exercise tolerance: good     Pulmonary:  Pulmonary Normal    Renal/:  Renal/ Normal     Hepatic/GI:  Hepatic/GI Normal    Musculoskeletal:  Musculoskeletal Normal    Neurological:  Neurology Normal    Endocrine:   Diabetes, gestational    Dermatological:  Skin Normal    Psych:   Psychiatric History          Physical Exam  General: Well nourished, Cooperative, Alert and Oriented    Airway:  Mallampati: II / II  Mouth Opening: Normal  TM Distance: Normal  Tongue: Normal  Neck ROM: Normal ROM    Dental:  Intact        Anesthesia Plan  Type of Anesthesia, risks & benefits discussed:    Anesthesia Type: Epidural  Intra-op Monitoring Plan: Standard ASA Monitors  Post Op Pain Control Plan: epidural analgesia  Informed Consent: Informed consent signed with the Patient and all parties understand the risks and agree with anesthesia plan.  All questions answered. Patient consented to blood products? Yes  ASA Score: 2  Day of Surgery Review of History & Physical: H&P Update referred to the surgeon/provider.I have interviewed and examined the patient. I have reviewed the patient's H&P dated: There are no significant changes.     Ready For Surgery From Anesthesia Perspective.     .

## 2023-03-06 NOTE — ANESTHESIA PROCEDURE NOTES
Epidural    Patient location during procedure: OB   Reason for block: primary anesthetic   Reason for block: at surgeon's request, post-op pain management  Diagnosis: Active Labor   Start time: 3/6/2023 1:55 PM  Timeout: 3/6/2023 1:50 PM  End time: 3/6/2023 2:02 PM  Surgery related to: labor    Staffing  Performing Provider: Lenny Trevino CRNA  Authorizing Provider: Lenny Trevino CRNA        Preanesthetic Checklist  Completed: patient identified, IV checked, site marked, surgical consent, monitors and equipment checked, pre-op evaluation, timeout performed, anesthesia consent given, hand hygiene performed and patient being monitored  Preparation  Patient position: sitting  Prep: ChloraPrep and other  Patient monitoring: Pulse Ox  Reason for block: primary anesthetic   Epidural  Skin Anesthetic: lidocaine 1%  Administration type: single shot  Approach: midline  Interspace: L4-5    Injection technique: CHRISTINE air  Block type: caudal.  Needle and Epidural Catheter  Needle type: Tuohy   Needle gauge: 18  Needle length: 5.0 inches  Catheter type: multi-orifice  Catheter size: 20 G  Insertion Attempts: 1  Test dose: 3 mL of lidocaine 1.5% with Epi 1-to-200,000  Additional Documentation: incremental injection, negative aspiration for heme and CSF and no paresthesia on injection  Needle localization: anatomical landmarks  Assessment  Ease of block: easy  Patient's tolerance of the procedure: comfortable throughout block No inadvertent dural puncture with Tuohy.  Dural puncture not performed with spinal needle

## 2023-03-06 NOTE — HPI
22 y.o. female  at 39w0d presented for scheduled induction.  Patient has well controlled, diet controlled GDM.  Fetal membranes ruptured spontaneously at 04:25.  She has received 2 doses cytotec. Normal fetal movement.

## 2023-03-06 NOTE — SUBJECTIVE & OBJECTIVE
Obstetric HPI:  Patient reports Date/time of onset: today contractions, active fetal movement, No vaginal bleeding , Yes loss of fluid     This pregnancy has been complicated by GDM - diet controlled    OB History    Para Term  AB Living   2 0 0 0 1 0   SAB IAB Ectopic Multiple Live Births   0 0 0 0 0      # Outcome Date GA Lbr Nguyễn/2nd Weight Sex Delivery Anes PTL Lv   2 Current            1 AB 21 6w0d    SAB   FD     Past Medical History:   Diagnosis Date    Anxiety disorder, unspecified     Irregular uterine contractions      Past Surgical History:   Procedure Laterality Date    TONSILECTOMY      TYMPANOSTOMY TUBE PLACEMENT         PTA Medications   Medication Sig    dimenhyDRINATE (DRAMAMINE) 50 MG tablet Take 50 mg by mouth nightly as needed.    prenatal vit no.124/iron/folic (PRENATAL VITAMIN ORAL) Take 1 tablet by mouth once daily.    TRUE METRIX GLUCOSE METER Misc USE TO TEST BLOOD SUGAR FOUR TIMES DAILY    TRUE METRIX GLUCOSE TEST STRIP Strp Place 1 strip onto the skin 4 (four) times daily.    ULTRA THIN LANCETS 30 gauge Misc SMARTSIG:Via Meter       Review of patient's allergies indicates:   Allergen Reactions    House dust Hives, Itching, Nausea And Vomiting and Shortness Of Breath        Family History       Problem Relation (Age of Onset)    Lung cancer Maternal Aunt    Uterine cancer Maternal Aunt          Tobacco Use    Smoking status: Never    Smokeless tobacco: Never   Substance and Sexual Activity    Alcohol use: Not Currently    Drug use: Never    Sexual activity: Not Currently     Partners: Male     Review of Systems   Constitutional:  Negative for chills and fever.   Eyes:  Negative for visual disturbance.   Respiratory:  Negative for cough, shortness of breath and wheezing.    Cardiovascular:  Negative for chest pain, palpitations and leg swelling.   Gastrointestinal:  Positive for abdominal pain. Negative for constipation and diarrhea.   Genitourinary:  Negative for dysuria,  flank pain, genital sores, pelvic pain, urgency, vaginal bleeding, vaginal discharge, vaginal pain, postcoital bleeding and vaginal odor.   Musculoskeletal:  Negative for back pain and leg pain.   Neurological:  Negative for seizures and headaches.   All other systems reviewed and are negative.   Objective:     Vital Signs (Most Recent):  Temp: 98.1 °F (36.7 °C) (03/06/23 0035)  Pulse: 91 (03/06/23 0035)  Resp: 18 (03/06/23 0035)  BP: 128/78 (03/06/23 0035)  SpO2: 100 % (03/06/23 0035) Vital Signs (24h Range):  Temp:  [98.1 °F (36.7 °C)] 98.1 °F (36.7 °C)  Pulse:  [91] 91  Resp:  [18] 18  SpO2:  [100 %] 100 %  BP: (128)/(78) 128/78     Weight: 61.7 kg (136 lb)  Body mass index is 22.63 kg/m².    FHT: 130 Cat 1 (reassuring)  TOCO:  Q 2-3 minutes    Physical Exam:   Constitutional: She is oriented to person, place, and time. She appears well-developed and well-nourished. No distress.    HENT:   Head: Normocephalic and atraumatic.    Eyes: Pupils are equal, round, and reactive to light. EOM are normal.    Neck: No tracheal deviation present. No thyromegaly present.    Cardiovascular:       Exam reveals no clubbing, no cyanosis and no edema.        Pulmonary/Chest: Effort normal and breath sounds normal.        Abdominal: There is no abdominal tenderness. There is no rebound and no guarding.     Genitourinary:    Vagina and rectum normal.   The external female genitalia was normal.   No external genitalia lesions identified,Genitalia hair distrobution normal .   There is no lesion on the right labia. There is no lesion on the left labia. Cervix is normal. Vagina exhibits no lesion. No  no vaginal discharge, tenderness or bleeding in the vagina. Cervix exhibits no motion tenderness and no tenderness. Uterus size: 39 cm.          Musculoskeletal: Normal range of motion.       Neurological: She is alert and oriented to person, place, and time. She has normal reflexes.    Skin: Skin is warm and dry. No cyanosis. Nails show  no clubbing.    Psychiatric: She has a normal mood and affect. Her behavior is normal. Thought content normal.     Cervix:  Dilation:  1  Effacement:  75%  Station: -2  Presentation: Vertex     Significant Labs:  Lab Results   Component Value Date    GROUPTRH B POS 07/23/2022       I have personallly reviewed all pertinent lab results from the last 24 hours.  Recent Lab Results         03/06/23  0027        ABO and RH B POS  Comment: @03/06/23 01:11 by DK2:  ABO/RH WAS DONE ON 07/23/2022 @2101 BY [facility] IN [facility]       Antibody Screen NEG  Comment: @03/06/23 01:35 by DK2:  ABO/RH WAS DONE ON 07/23/2022 @2101 BY [facility] IN [facility]       Baso # 0.05       Basophil % 0.5       Eos # 0.07       Eosinophil % 0.7       Hematocrit 34.1       Hemoglobin 12.0       Immature Grans (Abs) 0.02       Immature Granulocytes 0.2       Lymph # 2.60       LYMPH % 26.9       MCH 30.8       MCHC 35.2       MCV 87.7       Mono # 0.53       Mono % 5.5       MPV 11.3       Neut # 6.40       Neut % 66.2       nRBC 0.0       Platelets 142       RBC 3.89       RDW 13.2       WBC 9.7

## 2023-03-07 LAB
ERYTHROCYTE [DISTWIDTH] IN BLOOD BY AUTOMATED COUNT: 13.3 % (ref 11–14.5)
HCT VFR BLD AUTO: 28.5 % (ref 36–48)
HGB BLD-MCNC: 10.2 G/DL (ref 11.8–16)
MCH RBC QN AUTO: 31.4 PG (ref 27–34)
MCV RBC AUTO: 87.7 FL (ref 79–99)
MEAN CELL HEMOGLOBIN CONCENTRATION (OHS) G/DL: 35.8 G/DL (ref 31–37)
NRBC BLD AUTO-RTO: 0 % (ref 0–1)
PLATELET # BLD AUTO: 140 X10(3)/MCL (ref 140–371)
PMV BLD AUTO: 11.2 FL (ref 9.4–12.4)
RBC # BLD AUTO: 3.25 X10(6)/MCL (ref 4–5.1)
WBC # SPEC AUTO: 17.6 X10(3)/MCL (ref 4–11.5)

## 2023-03-07 PROCEDURE — 59409 OBSTETRICAL CARE: CPT | Mod: GB,,, | Performed by: OBSTETRICS & GYNECOLOGY

## 2023-03-07 PROCEDURE — 25000003 PHARM REV CODE 250

## 2023-03-07 PROCEDURE — 93010 EKG 12-LEAD: ICD-10-PCS | Mod: ,,, | Performed by: INTERNAL MEDICINE

## 2023-03-07 PROCEDURE — 59409 PR OBSTETRICAL CARE,VAG DELIV ONLY: ICD-10-PCS | Mod: GB,,, | Performed by: OBSTETRICS & GYNECOLOGY

## 2023-03-07 PROCEDURE — 11000001 HC ACUTE MED/SURG PRIVATE ROOM

## 2023-03-07 PROCEDURE — 85027 COMPLETE CBC AUTOMATED: CPT | Performed by: OBSTETRICS & GYNECOLOGY

## 2023-03-07 PROCEDURE — 72200006 HC VAGINAL DELIVERY LEVEL III

## 2023-03-07 PROCEDURE — 25000003 PHARM REV CODE 250: Performed by: OBSTETRICS & GYNECOLOGY

## 2023-03-07 PROCEDURE — 72100003 HC LABOR CARE, EA. ADDL. 8 HRS

## 2023-03-07 PROCEDURE — 93005 ELECTROCARDIOGRAM TRACING: CPT

## 2023-03-07 PROCEDURE — 36415 COLL VENOUS BLD VENIPUNCTURE: CPT | Performed by: OBSTETRICS & GYNECOLOGY

## 2023-03-07 PROCEDURE — 93010 ELECTROCARDIOGRAM REPORT: CPT | Mod: ,,, | Performed by: INTERNAL MEDICINE

## 2023-03-07 PROCEDURE — 63600175 PHARM REV CODE 636 W HCPCS: Performed by: OBSTETRICS & GYNECOLOGY

## 2023-03-07 RX ORDER — OXYTOCIN/RINGER'S LACTATE 30/500 ML
95 PLASTIC BAG, INJECTION (ML) INTRAVENOUS ONCE AS NEEDED
Status: DISCONTINUED | OUTPATIENT
Start: 2023-03-07 | End: 2023-03-08 | Stop reason: HOSPADM

## 2023-03-07 RX ORDER — MISOPROSTOL 100 UG/1
1000 TABLET ORAL ONCE AS NEEDED
Status: DISCONTINUED | OUTPATIENT
Start: 2023-03-07 | End: 2023-03-08 | Stop reason: HOSPADM

## 2023-03-07 RX ORDER — HYDROCODONE BITARTRATE AND ACETAMINOPHEN 7.5; 325 MG/1; MG/1
1 TABLET ORAL EVERY 4 HOURS PRN
Status: DISCONTINUED | OUTPATIENT
Start: 2023-03-07 | End: 2023-03-08 | Stop reason: HOSPADM

## 2023-03-07 RX ORDER — PRENATAL WITH FERROUS FUM AND FOLIC ACID 3080; 920; 120; 400; 22; 1.84; 3; 20; 10; 1; 12; 200; 27; 25; 2 [IU]/1; [IU]/1; MG/1; [IU]/1; MG/1; MG/1; MG/1; MG/1; MG/1; MG/1; UG/1; MG/1; MG/1; MG/1; MG/1
1 TABLET ORAL DAILY
Status: DISCONTINUED | OUTPATIENT
Start: 2023-03-07 | End: 2023-03-08 | Stop reason: HOSPADM

## 2023-03-07 RX ORDER — LANOLIN ALCOHOL/MO/W.PET/CERES
1 CREAM (GRAM) TOPICAL DAILY
Status: DISCONTINUED | OUTPATIENT
Start: 2023-03-07 | End: 2023-03-08 | Stop reason: HOSPADM

## 2023-03-07 RX ORDER — METOPROLOL TARTRATE 25 MG/1
25 TABLET, FILM COATED ORAL ONCE
Status: COMPLETED | OUTPATIENT
Start: 2023-03-07 | End: 2023-03-07

## 2023-03-07 RX ORDER — DIPHENHYDRAMINE HYDROCHLORIDE 50 MG/ML
25 INJECTION INTRAMUSCULAR; INTRAVENOUS EVERY 4 HOURS PRN
Status: DISCONTINUED | OUTPATIENT
Start: 2023-03-07 | End: 2023-03-08 | Stop reason: HOSPADM

## 2023-03-07 RX ORDER — OXYCODONE AND ACETAMINOPHEN 10; 325 MG/1; MG/1
1 TABLET ORAL EVERY 4 HOURS PRN
Status: DISCONTINUED | OUTPATIENT
Start: 2023-03-07 | End: 2023-03-08 | Stop reason: HOSPADM

## 2023-03-07 RX ORDER — TRANEXAMIC ACID 10 MG/ML
1000 INJECTION, SOLUTION INTRAVENOUS ONCE AS NEEDED
Status: DISCONTINUED | OUTPATIENT
Start: 2023-03-07 | End: 2023-03-08 | Stop reason: HOSPADM

## 2023-03-07 RX ORDER — ACETAMINOPHEN 325 MG/1
650 TABLET ORAL EVERY 6 HOURS PRN
Status: DISCONTINUED | OUTPATIENT
Start: 2023-03-07 | End: 2023-03-08 | Stop reason: HOSPADM

## 2023-03-07 RX ORDER — OXYTOCIN/RINGER'S LACTATE 30/500 ML
95 PLASTIC BAG, INJECTION (ML) INTRAVENOUS ONCE
Status: DISCONTINUED | OUTPATIENT
Start: 2023-03-07 | End: 2023-03-08 | Stop reason: HOSPADM

## 2023-03-07 RX ORDER — KETOROLAC TROMETHAMINE 30 MG/ML
30 INJECTION, SOLUTION INTRAMUSCULAR; INTRAVENOUS EVERY 8 HOURS
Status: CANCELLED | OUTPATIENT
Start: 2023-03-07 | End: 2023-03-08

## 2023-03-07 RX ORDER — OXYTOCIN/RINGER'S LACTATE 30/500 ML
334 PLASTIC BAG, INJECTION (ML) INTRAVENOUS ONCE AS NEEDED
Status: DISCONTINUED | OUTPATIENT
Start: 2023-03-07 | End: 2023-03-08 | Stop reason: HOSPADM

## 2023-03-07 RX ORDER — SODIUM CHLORIDE 0.9 % (FLUSH) 0.9 %
3 SYRINGE (ML) INJECTION
Status: DISCONTINUED | OUTPATIENT
Start: 2023-03-07 | End: 2023-03-08 | Stop reason: HOSPADM

## 2023-03-07 RX ORDER — OXYTOCIN 10 [USP'U]/ML
10 INJECTION, SOLUTION INTRAMUSCULAR; INTRAVENOUS ONCE AS NEEDED
Status: DISCONTINUED | OUTPATIENT
Start: 2023-03-07 | End: 2023-03-08 | Stop reason: HOSPADM

## 2023-03-07 RX ORDER — ONDANSETRON 4 MG/1
8 TABLET, ORALLY DISINTEGRATING ORAL EVERY 8 HOURS PRN
Status: DISCONTINUED | OUTPATIENT
Start: 2023-03-07 | End: 2023-03-08 | Stop reason: HOSPADM

## 2023-03-07 RX ORDER — PROCHLORPERAZINE EDISYLATE 5 MG/ML
5 INJECTION INTRAMUSCULAR; INTRAVENOUS EVERY 6 HOURS PRN
Status: DISCONTINUED | OUTPATIENT
Start: 2023-03-07 | End: 2023-03-08 | Stop reason: HOSPADM

## 2023-03-07 RX ORDER — HYDROMORPHONE HYDROCHLORIDE 1 MG/ML
1 INJECTION, SOLUTION INTRAMUSCULAR; INTRAVENOUS; SUBCUTANEOUS EVERY 6 HOURS PRN
Status: DISCONTINUED | OUTPATIENT
Start: 2023-03-07 | End: 2023-03-07

## 2023-03-07 RX ORDER — IBUPROFEN 600 MG/1
600 TABLET ORAL EVERY 6 HOURS PRN
Status: DISCONTINUED | OUTPATIENT
Start: 2023-03-07 | End: 2023-03-08 | Stop reason: HOSPADM

## 2023-03-07 RX ORDER — DOCUSATE SODIUM 100 MG/1
200 CAPSULE, LIQUID FILLED ORAL 2 TIMES DAILY PRN
Status: DISCONTINUED | OUTPATIENT
Start: 2023-03-07 | End: 2023-03-08 | Stop reason: HOSPADM

## 2023-03-07 RX ORDER — METHYLERGONOVINE MALEATE 0.2 MG/ML
200 INJECTION INTRAVENOUS
Status: DISCONTINUED | OUTPATIENT
Start: 2023-03-07 | End: 2023-03-08 | Stop reason: HOSPADM

## 2023-03-07 RX ORDER — IBUPROFEN 400 MG/1
800 TABLET ORAL EVERY 8 HOURS
Status: CANCELLED | OUTPATIENT
Start: 2023-03-08

## 2023-03-07 RX ORDER — BISACODYL 10 MG
10 SUPPOSITORY, RECTAL RECTAL DAILY PRN
Status: DISCONTINUED | OUTPATIENT
Start: 2023-03-07 | End: 2023-03-08 | Stop reason: HOSPADM

## 2023-03-07 RX ORDER — DIPHENHYDRAMINE HCL 25 MG
25 CAPSULE ORAL EVERY 4 HOURS PRN
Status: DISCONTINUED | OUTPATIENT
Start: 2023-03-07 | End: 2023-03-08 | Stop reason: HOSPADM

## 2023-03-07 RX ORDER — MAG HYDROX/ALUMINUM HYD/SIMETH 200-200-20
30 SUSPENSION, ORAL (FINAL DOSE FORM) ORAL EVERY 6 HOURS PRN
Status: DISCONTINUED | OUTPATIENT
Start: 2023-03-07 | End: 2023-03-08 | Stop reason: HOSPADM

## 2023-03-07 RX ORDER — SIMETHICONE 80 MG
1 TABLET,CHEWABLE ORAL EVERY 6 HOURS PRN
Status: DISCONTINUED | OUTPATIENT
Start: 2023-03-07 | End: 2023-03-08 | Stop reason: HOSPADM

## 2023-03-07 RX ORDER — CARBOPROST TROMETHAMINE 250 UG/ML
250 INJECTION, SOLUTION INTRAMUSCULAR
Status: DISCONTINUED | OUTPATIENT
Start: 2023-03-07 | End: 2023-03-08 | Stop reason: HOSPADM

## 2023-03-07 RX ORDER — HYDROCORTISONE 25 MG/G
CREAM TOPICAL 3 TIMES DAILY PRN
Status: DISCONTINUED | OUTPATIENT
Start: 2023-03-07 | End: 2023-03-08 | Stop reason: HOSPADM

## 2023-03-07 RX ADMIN — SODIUM CHLORIDE, POTASSIUM CHLORIDE, SODIUM LACTATE AND CALCIUM CHLORIDE 1000 ML: 600; 310; 30; 20 INJECTION, SOLUTION INTRAVENOUS at 10:03

## 2023-03-07 RX ADMIN — BENZOCAINE AND LEVOMENTHOL: 200; 5 SPRAY TOPICAL at 07:03

## 2023-03-07 RX ADMIN — METOPROLOL TARTRATE 25 MG: 25 TABLET, FILM COATED ORAL at 11:03

## 2023-03-07 RX ADMIN — HYDROCODONE BITARTRATE AND ACETAMINOPHEN 1 TABLET: 7.5; 325 TABLET ORAL at 05:03

## 2023-03-07 RX ADMIN — ACETAMINOPHEN 650 MG: 325 TABLET, FILM COATED ORAL at 10:03

## 2023-03-07 RX ADMIN — IBUPROFEN 600 MG: 600 TABLET, FILM COATED ORAL at 07:03

## 2023-03-07 RX ADMIN — HYDROCODONE BITARTRATE AND ACETAMINOPHEN 1 TABLET: 7.5; 325 TABLET ORAL at 08:03

## 2023-03-07 NOTE — PLAN OF CARE
03/07/23 1127   OB Discharge Planning Assessment   Assessment Type Discharge Planning Assessment   Source of Information patient   Verified Demographic and Insurance Information Yes   Insurance Medicaid   Medicaid Healthy [employer]   Pastoral Care/Clergy/ Contact Status none needed   People in Home significant other;parent(s)   Name(s) of People in Home Joe Muhammad S/O and her parents   Number people in home 4   Relationship Status In relationship   Name of Support/Comfort Primary Source Joe Amador sig other   Employed No   Currently Enrolled in School No   Highest Level of Education Bachelor's Degree   Father's Involvement Fully Involved   Is Father signing the birth certificate Yes   Father's Address same   Father Currently Enrolled in School No   Father's Employer [employer]   Father's Job Title    Family Involvement High   Primary Contact Name and Number Joe Amador [phone]   Received Prenatal Care Yes   Transportation Anticipated car, drives self;family or friend will provide   Receive WIC Benefits Already certified, will apply for new born    Arrangements Self   Adoption Planned no   Infant Feeding Plan breastfeeding   Previous Breastfeeding Experience no   Breast Pump Needed no   Does baby have crib or safe sleep space? Yes   Do you have a car seat? Yes   Has other essential care items? Clothing;Bottles;Diapers   Pediatrician    Resource/Environmental Concerns none   Equipment Currently Used at Home none   Potential Discharge Needs None   DME Needed Upon Discharge  none   DCFS No indications (Indicators for Report)   Discharge Plan A Home with family   Discharge Plan B Home with family   Do you have any problems affording any of your prescribed medications? No   Physical Activity   On average, how many days per week do you engage in moderate to strenuous exercise (like a brisk walk)? 3 days   On average, how many minutes do you engage in  exercise at this level? 60 min   Financial Resource Strain   How hard is it for you to pay for the very basics like food, housing, medical care, and heating? Not hard   Housing Stability   In the last 12 months, was there a time when you were not able to pay the mortgage or rent on time? N   In the last 12 months, how many places have you lived? 1   In the last 12 months, was there a time when you did not have a steady place to sleep or slept in a shelter (including now)? N   Transportation Needs   In the past 12 months, has lack of transportation kept you from medical appointments or from getting medications? no   In the past 12 months, has lack of transportation kept you from meetings, work, or from getting things needed for daily living? No   Food Insecurity   Within the past 12 months, you worried that your food would run out before you got the money to buy more. Never true   Within the past 12 months, the food you bought just didn't last and you didn't have money to get more. Never true   Stress   Do you feel stress - tense, restless, nervous, or anxious, or unable to sleep at night because your mind is troubled all the time - these days? Not at all   Social Connections   In a typical week, how many times do you talk on the phone with family, friends, or neighbors? More than 3   How often do you get together with friends or relatives? More than 3   How often do you attend Worship or Yazidism services? 1 to 4   Do you belong to any clubs or organizations such as Worship groups, unions, fraternal or athletic groups, or school groups? No   How often do you attend meetings of the clubs or organizations you belong to? Never   Are you , , , , never , or living with a partner? Living with   Alcohol Use   Q1: How often do you have a drink containing alcohol? Never   Q2: How many drinks containing alcohol do you have on a typical day when you are drinking? None   Q3: How often do you  have six or more drinks on one occasion? Never

## 2023-03-07 NOTE — NURSING
PT TRANSFERRED TO BATHROOM PER STEADY; PT VOIDING WITHOUT DIFFICULTY; VAGINAL PADS CHANGED AND ICE PACK PLACED; TRANSFERRED PT TO POSTPARTUM ROOM 125. ALLOWED TIME FOR PT TO VOICE QUESTIONS AND CONCERNS; WILL CONTINUE TO MONITOR.

## 2023-03-07 NOTE — ANESTHESIA POSTPROCEDURE EVALUATION
Anesthesia Post Evaluation    Patient: Robyn Guerrero    Procedure(s) Performed: * No procedures listed *    Final Anesthesia Type: epidural      Patient location during evaluation: PACU  Patient participation: Yes- Able to Participate  Level of consciousness: awake and alert, awake and oriented  Post-procedure vital signs: reviewed and stable  Pain management: adequate  Airway patency: patent  FREEMAN mitigation strategies: Preoperative initiation of continuous positive airway pressure (CPAP) or non-invasive positive pressure ventilation (NIPPV), Multimodal analgesia and Use of major conduction anesthesia (spinal/epidural) or peripheral nerve block  PONV status at discharge: No PONV  Anesthetic complications: no      Cardiovascular status: blood pressure returned to baseline  Respiratory status: unassisted, room air and spontaneous ventilation  Hydration status: euvolemic  Follow-up not needed.          Vitals Value Taken Time   /87 03/07/23 0430   Temp 37.5 °C (99.5 °F) 03/07/23 0359   Pulse 125 03/07/23 0430   Resp 20 03/06/23 1900   SpO2 100 % 03/06/23 1900   Vitals shown include unvalidated device data.      No case tracking events are documented in the log.      Pain/Deja Score: Pain Rating Prior to Med Admin: 8 (3/6/2023 12:21 PM)

## 2023-03-08 VITALS
TEMPERATURE: 97 F | OXYGEN SATURATION: 97 % | HEIGHT: 65 IN | DIASTOLIC BLOOD PRESSURE: 74 MMHG | WEIGHT: 136 LBS | RESPIRATION RATE: 20 BRPM | HEART RATE: 113 BPM | BODY MASS INDEX: 22.66 KG/M2 | SYSTOLIC BLOOD PRESSURE: 123 MMHG

## 2023-03-08 LAB
BASOPHILS # BLD AUTO: 0.04 X10(3)/MCL (ref 0.01–0.08)
BASOPHILS NFR BLD AUTO: 0.3 % (ref 0.1–1.2)
EOSINOPHIL # BLD AUTO: 0.06 X10(3)/MCL (ref 0.04–0.36)
EOSINOPHIL NFR BLD AUTO: 0.5 % (ref 0.7–7)
ERYTHROCYTE [DISTWIDTH] IN BLOOD BY AUTOMATED COUNT: 13.4 % (ref 11–14.5)
HCT VFR BLD AUTO: 24.9 % (ref 36–48)
HGB BLD-MCNC: 8.5 G/DL (ref 11.8–16)
IMM GRANULOCYTES # BLD AUTO: 0.07 X10(3)/MCL (ref 0–0.03)
IMM GRANULOCYTES NFR BLD AUTO: 0.5 % (ref 0–0.5)
LYMPHOCYTES # BLD AUTO: 2.39 X10(3)/MCL (ref 1.16–3.74)
LYMPHOCYTES NFR BLD AUTO: 18.5 % (ref 20–55)
MCH RBC QN AUTO: 30.6 PG (ref 27–34)
MCV RBC AUTO: 89.6 FL (ref 79–99)
MEAN CELL HEMOGLOBIN CONCENTRATION (OHS) G/DL: 34.1 G/DL (ref 31–37)
MONOCYTES # BLD AUTO: 0.65 X10(3)/MCL (ref 0.24–0.36)
MONOCYTES NFR BLD AUTO: 5 % (ref 4.7–12.5)
NEUTROPHILS # BLD AUTO: 9.73 X10(3)/MCL (ref 1.56–6.13)
NEUTROPHILS NFR BLD AUTO: 75.2 % (ref 37–73)
NRBC BLD AUTO-RTO: 0 % (ref 0–1)
PLATELET # BLD AUTO: 115 X10(3)/MCL (ref 140–371)
PMV BLD AUTO: 11.1 FL (ref 9.4–12.4)
POCT GLUCOSE: 77 MG/DL (ref 70–110)
RBC # BLD AUTO: 2.78 X10(6)/MCL (ref 4–5.1)
WBC # SPEC AUTO: 12.9 X10(3)/MCL (ref 4–11.5)

## 2023-03-08 PROCEDURE — 99238 HOSP IP/OBS DSCHRG MGMT 30/<: CPT | Mod: ,,,

## 2023-03-08 PROCEDURE — 36415 COLL VENOUS BLD VENIPUNCTURE: CPT | Performed by: OBSTETRICS & GYNECOLOGY

## 2023-03-08 PROCEDURE — 99238 PR HOSPITAL DISCHARGE DAY,<30 MIN: ICD-10-PCS | Mod: ,,,

## 2023-03-08 PROCEDURE — 85025 COMPLETE CBC W/AUTO DIFF WBC: CPT | Performed by: OBSTETRICS & GYNECOLOGY

## 2023-03-08 PROCEDURE — 25000003 PHARM REV CODE 250: Performed by: OBSTETRICS & GYNECOLOGY

## 2023-03-08 RX ORDER — TRIPROLIDINE/PSEUDOEPHEDRINE 2.5MG-60MG
30 TABLET ORAL EVERY 6 HOURS PRN
Qty: 480 ML | Refills: 2 | Status: SHIPPED | OUTPATIENT
Start: 2023-03-08 | End: 2023-04-26

## 2023-03-08 RX ORDER — IRON,CARBONYL/ASCORBIC ACID 100-250 MG
1 TABLET ORAL 2 TIMES DAILY
Qty: 60 TABLET | Refills: 3 | Status: SHIPPED | OUTPATIENT
Start: 2023-03-08 | End: 2023-04-26

## 2023-03-08 RX ORDER — IRON,CARBONYL/ASCORBIC ACID 100-250 MG
1 TABLET ORAL DAILY
Qty: 30 TABLET | Refills: 3 | Status: SHIPPED | OUTPATIENT
Start: 2023-03-08 | End: 2023-04-26

## 2023-03-08 RX ORDER — METOPROLOL SUCCINATE 25 MG/1
25 TABLET, EXTENDED RELEASE ORAL DAILY
Qty: 30 TABLET | Refills: 1 | Status: SHIPPED | OUTPATIENT
Start: 2023-03-08 | End: 2023-04-26

## 2023-03-08 RX ADMIN — PRENATAL VITAMINS-IRON FUMARATE 27 MG IRON-FOLIC ACID 0.8 MG TABLET 1 TABLET: at 09:03

## 2023-03-08 RX ADMIN — FERROUS SULFATE TAB 325 MG (65 MG ELEMENTAL FE) 1 EACH: 325 (65 FE) TAB at 09:03

## 2023-03-08 NOTE — NURSING
NOTIFIED DR. MAZARIEGOS OF PT HEART RATE BETWEEN 120-140 AND TEMP 99.2. ORDERS GIVEN FOR EKG, LR BOLUS, AND CBC. IF HR DOES NOT DECREASE, ORDER METOPROLOL 25 MG PO X1.

## 2023-03-08 NOTE — DISCHARGE SUMMARY
[facility]  Obstetrics  Discharge Summary      Patient Name: Robyn Guerrero  MRN: [id number]  Admission Date: 3/6/2023  Hospital Length of Stay: 2 days  Discharge Date and Time:  2023 10:57 AM  Attending Physician: Regina Garrett MD   Discharging Provider: GREGG HAYWARD  Primary Care Provider: Primary Doctor Ana    HPI:  She is doing well this morning. She is tolerating a regular diet without nausea or vomiting. She is voiding spontaneously. She is ambulating. She has passed flatus, and has not had a BM. Vaginal bleeding is mild. She denies fever or chills. Abdominal pain is mild and controlled with oral medications. She Is breastfeeding. Pt desires discharge. She will have a f/u BP and HR check in office in one week d/t starting Toprol XL.     Hospital Course: Pt is a 23y/o WF , s/p vacuum assisted delivery, PP day #1. Pt presented to the hospital at 39w0d for induction. She experienced a vacuum assisted vaginal delivery per Dr. Garrett. (See delivery note) She delivered a viable male on 23 at 0238. She experienced a 3rd degree laceration and midline episiotomy that was routinely repaired. Routine postpartum care was initiated. Morning after delivery, it was noted that the pts heart rate was 120-140. EKG revealed sinus tachycardia. Pt was given a bolus of LR and placed on telemetry. Little improvement was noted in heart rate so she was given Metoprolol 25mg x1 PO and HR improved. H&H this am did drop to 8.5 and 24.9 from 10.2 & 28.5. Pt will receive and be discharged on Icar. She will also have a f/u 2hr GTT at 6 weeks d/t third trimester GDM.     Consults (From admission, onward)          Status Ordering Provider     Inpatient consult to Anesthesiology  Once        Provider:  (Not yet assigned)    Acknowledged REGINA GARRETT            Final Active Diagnoses:    Diagnosis Date Noted POA    PRINCIPAL PROBLEM:  Vacuum extractor delivery, delivered [O75.9] 2023 No     Anxiety during pregnancy [O99.340, F41.9] 03/06/2023 Yes    Gestational diabetes mellitus (GDM) in third trimester [O24.419] 02/13/2023 Yes      Problems Resolved During this Admission:        Feeding Method: breast    Immunizations       None            Delivery:    Episiotomy: Median   Lacerations: 3rd   Repair suture:     Repair # of packets: 2   Blood loss (ml):       Birth information:  YOB: 2023   Time of birth: 2:38 AM   Sex: male   Delivery type: Vaginal, Vacuum (Extractor)   Gestational Age: 39w1d    Delivery Clinician:      Other providers:       Additional  information:  Forceps:    Vacuum:    Breech:    Observed anomalies      Living?:           APGARS  One minute Five minutes Ten minutes   Skin color:         Heart rate:         Grimace:         Muscle tone:         Breathing:         Totals: 6  7  9      Placenta: Delivered:       appearance  Pending Diagnostic Studies:       None            Discharged Condition: good    Disposition: Home or Self Care    Follow Up:   Follow-up Information       REGINA GARRETT MD Follow up on 4/17/2023.    Specialty: Obstetrics and Gynecology  Why: The patient will followup with Dr. Garrett for six week examiantion on Monday, April 17@ 10:30 AM.  Contact information:  [address] [place] [id number] [phone]               REGINA GARRETT MD Follow up in 1 week(s).    Specialty: Obstetrics and Gynecology  Why: For BP and HR check in clinic on 03/16/23 @ 0820  Contact information:  [address] [place] [id number]  [phone]                           Patient Instructions:      Diet Adult Regular     Lifting restrictions     Pelvic Rest     Notify your health care provider if you experience any of the following:  temperature >100.4     Notify your health care provider if you experience any of the following:  persistent nausea and vomiting or diarrhea     Notify your health care provider if you experience any of the following:  severe uncontrolled  pain     Notify your health care provider if you experience any of the following:  difficulty breathing or increased cough     Notify your health care provider if you experience any of the following:  severe persistent headache     Notify your health care provider if you experience any of the following:  worsening rash     Notify your health care provider if you experience any of the following:  persistent dizziness, light-headedness, or visual disturbances     Notify your health care provider if you experience any of the following:  increased confusion or weakness     Activity as tolerated     Medications:  Current Discharge Medication List        START taking these medications    Details   ibuprofen 20 mg/mL oral liquid Take 30 mLs (600 mg total) by mouth every 6 (six) hours as needed for Pain.  Qty: 480 mL, Refills: 2      !! iron-vitamin C 100-250 mg, ICAR-C, (ICAR-C) 100-250 mg Tab Take 1 tablet by mouth once daily.  Qty: 30 tablet, Refills: 3      !! iron-vitamin C 100-250 mg, ICAR-C, (ICAR-C) 100-250 mg Tab Take 1 tablet by mouth 2 (two) times a day.  Qty: 60 tablet, Refills: 3      metoprolol succinate (TOPROL-XL) 25 MG 24 hr tablet Take 1 tablet (25 mg total) by mouth once daily.  Qty: 30 tablet, Refills: 1    Comments: .       !! - Potential duplicate medications found. Please discuss with provider.        CONTINUE these medications which have NOT CHANGED    Details   dimenhyDRINATE (DRAMAMINE) 50 MG tablet Take 50 mg by mouth nightly as needed.      prenatal vit no.124/iron/folic (PRENATAL VITAMIN ORAL) Take 1 tablet by mouth once daily.           STOP taking these medications       TRUE METRIX GLUCOSE METER Misc Comments:   Reason for Stopping:         TRUE METRIX GLUCOSE TEST STRIP Strp Comments:   Reason for Stopping:         ULTRA THIN LANCETS 30 gauge Misc Comments:   Reason for Stopping:             Rx's  Icar BID  Ibuprofen  Toprol Xl    F/u in clinic in one week for BP and HR f/u  Will have a f/u  2hr GTT at her 6 weeks visit d/t 3rd trimester GDM.     GREGG HAYWARD  Obstetrics  [facility]

## 2023-03-09 NOTE — PHYSICIAN QUERY
PT Name: Robyn Guerrero  MR #: [id number]     DOCUMENTATION CLARIFICATION      CDS/: DEMETRIO Loaiza,RNC-MNN       Contact information:[email or web address]    This form is a permanent document in the medical record.     Query Date: March 9, 2023    By submitting this query, we are merely seeking further clarification of documentation.  Please utilize your independent clinical judgment when addressing the question(s) below.     Indicators Supporting Clinical Findings Location in Medical Record   X Delivery Type Vacuum assisted vaginal delivery of live infant performed for maternal exhaustion and prolonged fetal heart rate deceleration L&D Delivery note 3/9   X 3rd degree Perineal Laceration or Tear documented She experienced a 3rd degree laceration and midline episiotomy     2nd degree laceration noted     Episiotomy:Median  Perineal Lacerations:3rd      Repaired: Yes Discharge Summary 3/8      L&D Delivery note 3/9   X Repair documented routinely repaired Discharge Summary 3/8    Other       Provider, please specify diagnosis or diagnoses associated with above clinical findings.    [   ] Less than 50% of the external anal sphincter was torn (type III a)   [   ] More than 50% of the external anal sphincter was torn (type III b)   [   ] Both the external anal sphincter (EAS) and internal anal sphincter (IAS) were torn (type III c)   [  x ] Other clarification of findings (specify):   [  ] Clinically undetermined       Please document in your progress notes daily for the duration of treatment, until resolved, and include in your discharge summary.    * She had a vacuum-assisted vaginal delivery and second degree perineal tear with repair.

## 2023-03-09 NOTE — PHYSICIAN QUERY
PT Name: Robyn Guerrero  MR #: [id number]    DOCUMENTATION CLARIFICATION      CDS/: DEMETRIO Loaiza,RNC-MNN        Contact information:[email or web address]    This form is a permanent document in the medical record.      Query Date: March 9, 2023    By submitting this query, we are merely seeking further clarification of documentation. Please utilize your independent clinical judgment when addressing the question(s) below.    The Medical Record contains the following:   Indicators  Supporting Clinical Findings Location in Medical Record    Anemia documented     X H&H Hgb=12.0-->10.2-->8.5  Hct=34.1-->28.5-->24.9 LAB 3/6-3/8   X BP                    HR Morning after delivery, it was noted that the pts heart rate was 120-140. EKG revealed sinus tachycardia. Pt was given a bolus of LR and placed on telemetry. Little improvement was noted in heart rate so she was given Metoprolol 25mg x1 PO and HR improved Discharge Summary 3/8    GI bleeding documented     X Acute bleeding (Non GI site) Calculated Running QBL Total (mL) 480 QBL Calculator 3/7    Transfusion(s)     X Acute/Chronic illness s/p vacuum assisted delivery Discharge Summary 3/8   X Treatments Pt will receive and be discharged on Icar Discharge Summary 3/8    Other       Provider, please specify diagnosis or diagnoses associated with above clinical findings.   [  X ] Acute blood loss anemia    [   ] Iron deficiency anemia    [   ] Anemia, unspecified    [   ] Other : _________________   [   ] Clinically Undetermined     Please document in your progress notes daily for the duration of treatment, until resolved, and include in your discharge summary.    Form No. [id number]

## 2023-03-09 NOTE — L&D DELIVERY NOTE
"[facility]-Mother/Baby  Vaginal Delivery   Operative Note    SUMMARY     Vacuum assisted vaginal delivery of live infant performed for maternal exhaustion and prolonged fetal heart rate deceleration.  After delivery, baby was placed on mothers abdomen for skin to skin and bulb suctioning performed.  Infant delivered position MICHAEL over perineum.  Nuchal cord: No.    Spontaneous delivery of placenta and IV pitocin given noting good uterine tone.  2nd degree laceration noted and repaired in normal fashion with 0-vicryl and 3-0 vicryl .  Patient tolerated delivery well. Sponge needle and lap counted correctly x2.    Indications: Vacuum extractor delivery, delivered  Pregnancy complicated by:   Patient Active Problem List   Diagnosis    Threatened     Anxiety disorder    Sinus tachycardia by electrocardiography    Gestational diabetes mellitus (GDM) in third trimester    Irregular uterine contractions    Anxiety during pregnancy    Vacuum extractor delivery, delivered     Admitting GA: 39w1d    Delivery Information for Everett Guerrero    Birth information:  YOB: 2023   Time of birth: 2:38 AM   Sex: male   Head Delivery Date/Time: 3/7/2023  2:37 AM   Delivery type: Vaginal, Vacuum (Extractor)   Gestational Age: 39w1d    Delivery Providers    Delivering clinician: Dk Garrett MD           Measurements    Weight: 3204 g  Weight (lbs): 7 lb 1 oz  Length: 50.8 cm  Length (in): 20"  Head circumference: 35.6 cm         Apgars    Living status: Living  Apgars:  1 min.:  5 min.:  10 min.:  15 min.:  20 min.:    Skin color:  1  1  1      Heart rate:  2  2  2      Reflex irritability:  1  2  2      Muscle tone:  1  1  2      Respiratory effort:  1  1  2      Total:  6  7  9      Apgars assigned by: RONIT RICHTER         Operative Delivery    Forceps attempted?: No  Vacuum extractor attempted?: Yes  Vacuum indications: Fetal Heart Rate or Rhythm Abnormality  Vacuum type: Kiwi Omni Cup " (mushroom)  First attempt time vacuum applied: 3/7/2023 02:29:00  First attempt time vacuum removed: 3/7/2023 02:37:00  Number of pop offs: 0  Number of pulls with vacuum: 10  Total vacuum application time: 8 minutes  Vacuum applied by: DR. MAZARIEGOS  Failed vacuum delivery?: No         Shoulder Dystocia    Shoulder dystocia present?: No           Presentation    Presentation: Vertex  Position: Left Occiput Anterior           Interventions/Resuscitation    Method: Bulb Suctioning, Blow By Oxygen, Tactile Stimulation       Cord    Vessels: 3 vessels  Complications: None  Delayed Cord Clamping?: No  Cord Clamped Date/Time: 3/7/2023  2:38 AM  Cord Blood Disposition: Lab  Gases Sent?: Yes  Stem Cell Collection (by MD): No       Placenta    Placenta delivery date/time: 3/7/2023 0240  Placenta removal: Spontaneous  Placenta appearance: Intact  Placenta disposition: Discarded           Labor Events:       labor: No     Labor Onset Date/Time: 2023 04:45     Dilation Complete Date/Time: 2023 23:30     Start Pushing Date/Time: 2023 00:10     Rupture Date/Time: 23  0445         Rupture type: SRM (Spontaneous Rupture)           Fluid Amount:         Fluid Color: Clear         steroids:       Antibiotics given for GBS: No     Induction: misoprostol;oxytocin     Indications for induction:  Elective     Augmentation:       Indications for augmentation:       Labor complications:       Additional complications:          Cervical ripening:                     Delivery:      Episiotomy: Median     Indication for Episiotomy: Instrumented Delivery     Perineal Lacerations: 2rd Repaired:  Yes   Periurethral Laceration:   Repaired:     Labial Laceration:   Repaired:     Sulcus Laceration:   Repaired:     Vaginal Laceration:   Repaired:     Cervical Laceration:   Repaired:     Repair suture:       Repair # of packets: 2     Last Value - EBL - Nursing (mL):       Sum - EBL - Nursing (mL): 0     Last  Value - EBL - Anesthesia (mL):        Calculated QBL (mL):         Vaginal Sweep Performed: Yes     Surgicount Correct: Yes       Other providers:       Anesthesia    Method: Epidural  Analgesics: STADOL INJ

## 2023-03-16 ENCOUNTER — TELEPHONE (OUTPATIENT)
Dept: OBSTETRICS AND GYNECOLOGY | Facility: CLINIC | Age: 23
End: 2023-03-16

## 2023-03-16 ENCOUNTER — CLINICAL SUPPORT (OUTPATIENT)
Dept: OBSTETRICS AND GYNECOLOGY | Facility: CLINIC | Age: 23
End: 2023-03-16
Payer: MEDICAID

## 2023-03-16 ENCOUNTER — PATIENT MESSAGE (OUTPATIENT)
Dept: OBSTETRICS AND GYNECOLOGY | Facility: CLINIC | Age: 23
End: 2023-03-16

## 2023-03-16 VITALS
BODY MASS INDEX: 20.14 KG/M2 | DIASTOLIC BLOOD PRESSURE: 60 MMHG | HEIGHT: 64 IN | TEMPERATURE: 98 F | WEIGHT: 117.94 LBS | SYSTOLIC BLOOD PRESSURE: 98 MMHG

## 2023-03-16 DIAGNOSIS — R03.0 ELEVATED BLOOD PRESSURE READING: Primary | ICD-10-CM

## 2023-03-16 NOTE — PROGRESS NOTES
Chief Complaint:     Chief Complaint   Patient presents with    B/P follow up     Pt. Present for b/p follow up due to elevated reading PP. Currently taking Metoprolol 25 mg daily.   3/7/23 @ 39 weeks.  +Bottlefeeding w/ Breast Milk due to difficulty latching.           HPI:   22 y.o.  presents for follow up on Elevated Blood Pressure postpartum.  Currently taking Metoprolol 25 mg daily per Dr. Dk Garrett.  Pt. Reports she did not take her daily dose today.  +Bottle feeding with Breast Milk.  Pumping due to baby having difficulty latching. Denies any Breast Problems. +Episiotomy, healing well.  Reports light bleeding at this time.              Current Outpatient Medications:     iron-vitamin C 100-250 mg, ICAR-C, (ICAR-C) 100-250 mg Tab, Take 1 tablet by mouth once daily., Disp: 30 tablet, Rfl: 3    metoprolol succinate (TOPROL-XL) 25 MG 24 hr tablet, Take 1 tablet (25 mg total) by mouth once daily., Disp: 30 tablet, Rfl: 1    prenatal vit no.124/iron/folic (PRENATAL VITAMIN ORAL), Take 1 tablet by mouth once daily., Disp: , Rfl:     dimenhyDRINATE (DRAMAMINE) 50 MG tablet, Take 50 mg by mouth nightly as needed., Disp: , Rfl:     ibuprofen 20 mg/mL oral liquid, Take 30 mLs (600 mg total) by mouth every 6 (six) hours as needed for Pain. (Patient not taking: Reported on 3/16/2023), Disp: 480 mL, Rfl: 2    iron-vitamin C 100-250 mg, ICAR-C, (ICAR-C) 100-250 mg Tab, Take 1 tablet by mouth 2 (two) times a day. (Patient not taking: Reported on 3/16/2023), Disp: 60 tablet, Rfl: 3    Review of patient's allergies indicates:   Allergen Reactions    House dust Hives, Itching, Nausea And Vomiting and Shortness Of Breath       Social History     Tobacco Use    Smoking status: Never     Passive exposure: Never    Smokeless tobacco: Never   Substance Use Topics    Alcohol use: Not Currently    Drug use: Never       Review of Systems       Physical Exam:   Vitals:    23 0830 23 0846   BP: 102/60 98/60   BP  "Location: Left arm Left arm   Patient Position: Sitting Sitting   BP Method: Medium (Manual) Medium (Manual)   Temp: 98.1 °F (36.7 °C)    TempSrc: Temporal    Weight: 53.5 kg (117 lb 15.1 oz)    Height: 5' 4.17" (1.63 m)        Body mass index is 20.14 kg/m².    Physical Exam    Assessment:     Patient Active Problem List   Diagnosis    Threatened     Anxiety disorder    Sinus tachycardia by electrocardiography    Gestational diabetes mellitus (GDM) in third trimester    Irregular uterine contractions    Anxiety during pregnancy    Vacuum extractor delivery, delivered       Health Maintenance Due   Topic Date Due    Hemoglobin A1c (Prediabetes)  Never done    Lipid Panel  Never done    COVID-19 Vaccine (1) Never done    HPV Vaccines (2 - 3-dose series) 2017    Pap Smear  Never done    Influenza Vaccine (1) 2022     Health Maintenance Topics with due status: Not Due       Topic Last Completion Date    TETANUS VACCINE 08/10/2013           Plan:  1. Elevated blood pressure reading   Patient here for Blood Pressure check, upon arrival B/P was 102/60  .  Let patient sit for awhile and rechecked B/P, reading was 98/60.  Hold Metoprolol dose today due to low reading.  Will discuss continuation of Metoprolol with Dr. Dk Garrett and will contact patient.  Pre-E precautions given.  Advised to RTC for postpartum exam.  Patient voiced understanding.      2. Patient is a currently breast-feeding mother  Offered patient referral to see Lactation Counselor regarding latching.  Patient refused at this time stating the baby is doing ok with her pumping and giving via bottle. If she changes her mind, she can call office.  Patient voiced understanding.         "

## 2023-03-16 NOTE — TELEPHONE ENCOUNTER
Orders to stop Metoprolol given per Dr. Dk Garrett.  Attempted to call patient.  NO answer.  Left voice message to stop Metoprolol.  A Inforamat message also sent to patient.  Patient was made aware that a message would be sent regarding her medication and she voiced understanding at that time.

## 2023-03-23 ENCOUNTER — OFFICE VISIT (OUTPATIENT)
Dept: OBSTETRICS AND GYNECOLOGY | Facility: CLINIC | Age: 23
End: 2023-03-23
Payer: MEDICAID

## 2023-03-23 VITALS
WEIGHT: 117 LBS | BODY MASS INDEX: 22.09 KG/M2 | SYSTOLIC BLOOD PRESSURE: 108 MMHG | TEMPERATURE: 97 F | DIASTOLIC BLOOD PRESSURE: 68 MMHG | HEIGHT: 61 IN

## 2023-03-23 DIAGNOSIS — N89.8 VAGINAL DISCHARGE: ICD-10-CM

## 2023-03-23 DIAGNOSIS — R30.0 DYSURIA: Primary | ICD-10-CM

## 2023-03-23 LAB
BILIRUB UR QL STRIP: NEGATIVE
GLUCOSE UR QL STRIP: NEGATIVE
KETONES UR QL STRIP: NEGATIVE
LEUKOCYTE ESTERASE UR QL STRIP: POSITIVE
PH, POC UA: 7
POC BLOOD, URINE: POSITIVE
POC NITRATES, URINE: NEGATIVE
PROT UR QL STRIP: POSITIVE
SP GR UR STRIP: 1.01 (ref 1–1.03)
UROBILINOGEN UR STRIP-ACNC: 0.2 (ref 0.1–1.1)

## 2023-03-23 PROCEDURE — 99213 PR OFFICE/OUTPT VISIT, EST, LEVL III, 20-29 MIN: ICD-10-PCS | Mod: 24,,,

## 2023-03-23 PROCEDURE — 81003 URINALYSIS AUTO W/O SCOPE: CPT | Mod: QW,,,

## 2023-03-23 PROCEDURE — 3074F PR MOST RECENT SYSTOLIC BLOOD PRESSURE < 130 MM HG: ICD-10-PCS | Mod: CPTII,,,

## 2023-03-23 PROCEDURE — 3008F PR BODY MASS INDEX (BMI) DOCUMENTED: ICD-10-PCS | Mod: CPTII,,,

## 2023-03-23 PROCEDURE — 1159F MED LIST DOCD IN RCRD: CPT | Mod: CPTII,,,

## 2023-03-23 PROCEDURE — 3078F DIAST BP <80 MM HG: CPT | Mod: CPTII,,,

## 2023-03-23 PROCEDURE — 3008F BODY MASS INDEX DOCD: CPT | Mod: CPTII,,,

## 2023-03-23 PROCEDURE — 3074F SYST BP LT 130 MM HG: CPT | Mod: CPTII,,,

## 2023-03-23 PROCEDURE — 1160F RVW MEDS BY RX/DR IN RCRD: CPT | Mod: CPTII,,,

## 2023-03-23 PROCEDURE — 3078F PR MOST RECENT DIASTOLIC BLOOD PRESSURE < 80 MM HG: ICD-10-PCS | Mod: CPTII,,,

## 2023-03-23 PROCEDURE — 1160F PR REVIEW ALL MEDS BY PRESCRIBER/CLIN PHARMACIST DOCUMENTED: ICD-10-PCS | Mod: CPTII,,,

## 2023-03-23 PROCEDURE — 99213 OFFICE O/P EST LOW 20 MIN: CPT | Mod: 24,,,

## 2023-03-23 PROCEDURE — 1159F PR MEDICATION LIST DOCUMENTED IN MEDICAL RECORD: ICD-10-PCS | Mod: CPTII,,,

## 2023-03-23 PROCEDURE — 81003 POCT URINALYSIS, DIPSTICK, AUTOMATED, W/O SCOPE: ICD-10-PCS | Mod: QW,,,

## 2023-03-23 RX ORDER — AMPICILLIN 500 MG/1
500 CAPSULE ORAL 4 TIMES DAILY
Qty: 28 CAPSULE | Refills: 0 | Status: SHIPPED | OUTPATIENT
Start: 2023-03-23 | End: 2023-03-30

## 2023-03-23 RX ORDER — NITROFURANTOIN 25; 75 MG/1; MG/1
100 CAPSULE ORAL 2 TIMES DAILY
Qty: 14 CAPSULE | Refills: 0 | Status: SHIPPED | OUTPATIENT
Start: 2023-03-23 | End: 2023-03-30

## 2023-03-23 NOTE — PROGRESS NOTES
"  Subjective:       Patient ID: Robyn Guerrero is a 22 y.o. female.    Chief Complaint:  Vulvar Itch (BP check- taken off BP meds last week. Doing well. /C/o burning to vagina when using bathroom, yellow discharge and odor x4 days. Reports daily headache, "usually starts at noon". Denies fever. /S/p  3/7/23 )      History of Present Illness  HPI  Robyn Guerrero 22 y.o. White female  presents to clinic with c/o a vulvar itch, yellow discharge, and burning with urination x4 days. She is s/p  on 23 with a 3rd degree perineal laceration. She also c/o a daily headache. She was recently taken off of her BP medication d/t hypotension last week. Denies any fever, abdominal tenderness, or pelvic pain. She is currently both breast and bottle feeding.       GYN & OB History  Patient's last menstrual period was 2022.   Date of Last Pap: No result found    OB History    Para Term  AB Living   2 1 1   1 1   SAB IAB Ectopic Multiple Live Births         0 1      # Outcome Date GA Lbr Nguyễn/2nd Weight Sex Delivery Anes PTL Lv   2 Term 23 39w1d 18:45 / 03:08 3.204 kg (7 lb 1 oz) M Vag-Vacuum EPI N MARYLU   1 AB 21 6w0d    SAB   FD       Review of Systems  Review of Systems   Constitutional:  Negative for activity change, appetite change, fatigue and unexpected weight change.   Respiratory:  Negative for cough and shortness of breath.    Cardiovascular:  Negative for chest pain and leg swelling.   Gastrointestinal:  Negative for abdominal pain, bloating, constipation, diarrhea, nausea, vomiting and reflux.   Endocrine: Negative for diabetes, hair loss, hot flashes, hyperthyroidism and hypothyroidism.   Genitourinary:  Positive for dysuria and vaginal discharge. Negative for bladder incontinence, decreased libido, dysmenorrhea, dyspareunia, flank pain, frequency, genital sores, hematuria, hot flashes, menorrhagia, menstrual problem, pelvic pain, urgency, vaginal bleeding, vaginal pain, " urinary incontinence, postcoital bleeding, postmenopausal bleeding, vaginal dryness and vaginal odor.   Integumentary:  Negative for rash, acne, hair changes, mole/lesion, breast mass, nipple discharge, breast skin changes and breast tenderness.   Neurological:  Positive for headaches.   Psychiatric/Behavioral:  Negative for depression and sleep disturbance. The patient is not nervous/anxious.    Breast: Negative for asymmetry, breast self exam, lump, mass, mastodynia, nipple discharge, skin changes and tenderness        Objective:    Physical Exam:   Constitutional: She is oriented to person, place, and time. She appears well-developed and well-nourished.     Eyes: Pupils are equal, round, and reactive to light.      Pulmonary/Chest: Effort normal.        Abdominal: Soft.     Genitourinary:    Inguinal canal and rectum normal.         The external female genitalia was normal.   Labial bartholins normal.There is vaginal discharge and tenderness in the vagina. Vagina was moist.   Genitourinary Comments: 3rd degree perineal laceration repait. Sutures intact. No signs of infection.              Musculoskeletal: Normal range of motion.       Neurological: She is alert and oriented to person, place, and time.    Skin: Skin is warm and dry.    Psychiatric: She has a normal mood and affect.        +vaginal odor  Wet prep: WBCs  Assessment:        1. Dysuria    2. Vaginal discharge    3. Perineal laceration during delivery, postpartum condition            Plan:      Rx: Macrobid for UTI  Urine culture  Increase hydration     Rx: Ampicillin for possible vaginal infection  One swab with leuk, myco, urea, AV, and BV panels  Notify office if no improvement  ER precautions given in pt begins to have fever, abdominal, or pelvic pain.

## 2023-03-25 LAB
BACTERIA UR CULT: NORMAL
BACTERIA UR CULT: NORMAL

## 2023-04-17 ENCOUNTER — POSTPARTUM VISIT (OUTPATIENT)
Dept: OBSTETRICS AND GYNECOLOGY | Facility: CLINIC | Age: 23
End: 2023-04-17
Payer: MEDICAID

## 2023-04-17 VITALS
WEIGHT: 119.81 LBS | SYSTOLIC BLOOD PRESSURE: 108 MMHG | DIASTOLIC BLOOD PRESSURE: 64 MMHG | TEMPERATURE: 98 F | HEIGHT: 61 IN | BODY MASS INDEX: 22.62 KG/M2

## 2023-04-17 DIAGNOSIS — R30.0 DYSURIA: Primary | ICD-10-CM

## 2023-04-17 DIAGNOSIS — N89.8 VAGINAL DISCHARGE: ICD-10-CM

## 2023-04-17 LAB
BACTERIA HYPHAE, POC: NEGATIVE
BILIRUB UR QL STRIP: NEGATIVE
GARDNERELLA VAGINALIS: NEGATIVE
GLUCOSE UR QL STRIP: NEGATIVE
KETONES UR QL STRIP: NEGATIVE
LEUKOCYTE ESTERASE UR QL STRIP: NEGATIVE
OTHER MICROSC. OBSERVATIONS: NORMAL
PH, POC UA: 7
POC BACTERIAL VAGINOSIS: NEGATIVE
POC BLOOD, URINE: POSITIVE
POC CLUE CELLS: NEGATIVE
POC NITRATES, URINE: NEGATIVE
PROT UR QL STRIP: NEGATIVE
SP GR UR STRIP: 1.01 (ref 1–1.03)
TRICHOMONAS, POC: NEGATIVE
UROBILINOGEN UR STRIP-ACNC: 0.2 (ref 0.1–1.1)
YEAST WET PREP: NEGATIVE
YEAST, POC: NEGATIVE

## 2023-04-17 PROCEDURE — 81003 POCT URINALYSIS, DIPSTICK, AUTOMATED, W/O SCOPE: ICD-10-PCS | Mod: QW,,,

## 2023-04-17 PROCEDURE — 87210 POCT KOH: ICD-10-PCS | Mod: QW,,,

## 2023-04-17 PROCEDURE — 59430 PR CARE AFTER DELIVERY ONLY: ICD-10-PCS | Mod: ,,,

## 2023-04-17 PROCEDURE — 87075 CULTR BACTERIA EXCEPT BLOOD: CPT

## 2023-04-17 PROCEDURE — 87077 CULTURE AEROBIC IDENTIFY: CPT

## 2023-04-17 PROCEDURE — 87210 SMEAR WET MOUNT SALINE/INK: CPT | Mod: QW,,,

## 2023-04-17 PROCEDURE — 81003 URINALYSIS AUTO W/O SCOPE: CPT | Mod: QW,,,

## 2023-04-17 RX ORDER — SULFAMETHOXAZOLE AND TRIMETHOPRIM 800; 160 MG/1; MG/1
1 TABLET ORAL 2 TIMES DAILY
Qty: 14 TABLET | Refills: 0 | Status: SHIPPED | OUTPATIENT
Start: 2023-04-17 | End: 2023-04-24

## 2023-04-17 NOTE — PROGRESS NOTES
Chief Complaint:  Postpartum Care (C/o dysuria & yellow d/c with odor. Unable to complete Ampicillin, not able to tolerate. )    History of Present Illness:   Robyn Guerrero is a 22 y.o. female  status post  (23) presents for her 6 week postpartum visit. She is complaining of continued episiotomy pain with associated yellow vaginal discharge and dysuria. Infant doing well, bottle feeding. Lochia resolved. Mood is good. Does not desire to discuss contraceptive management. Pt has not been sexually active since delivery d/t pain. Pt had a one swab from 23 that was positive for staph and enterococcus but pt was unable to complete the ampicillin treatment bc her stomach could no tolerated it.       LMP: No cycle since delivery  Sexually Active: Not currenlty    Contraception: None   H/o STI: no    Review of Systems:  General/Constitutional: Chills denies. Fatigue/weakness denies . Fever denies . Night sweats denies . Hot flashes denies    Respiratory: Cough denies . Hemoptysis denies . SOB denies . Sputum production denies . Wheezing denies .   Cardiovascular: Chest pain denies . Dizziness denies . Palpitations denies . Swelling in hands/feet denies    Gastrointestinal: Abdominal pain denies . Blood in stool denies . Constipation denies. Diarrhea denies . Heartburn denies . Nausea denies . Vomiting denies    Genitourinary: Incontinence denies . Blood in urine denies. Frequent urination denies . Painful urination admits  Urinary urgency denies.  Nocturia denies    Gynecologic: Irregular menses denies. Heavy bleeding  denies. Painful menses denies. Vaginal discharge admits. Vaginal odor denies. Vaginal itching denies   Vaginal lesion denies.  Pelvic pain denies . Decreased libido denies. Vulvar lesion denies . Prolapse of genital organs denies . Painful intercourse denies . Postcoital bleeding denies    Psychiatric: Depression denies. Anxiety denies     OB History          2    Para   1     "Term   1            AB   1    Living   1         SAB        IAB        Ectopic        Multiple   0    Live Births   1                 Past Medical History:   Diagnosis Date    Anxiety disorder, unspecified     Irregular uterine contractions          Current Outpatient Medications:     dimenhyDRINATE (DRAMAMINE) 50 MG tablet, Take 50 mg by mouth nightly as needed., Disp: , Rfl:     ibuprofen 20 mg/mL oral liquid, Take 30 mLs (600 mg total) by mouth every 6 (six) hours as needed for Pain. (Patient not taking: Reported on 3/16/2023), Disp: 480 mL, Rfl: 2    iron-vitamin C 100-250 mg, ICAR-C, (ICAR-C) 100-250 mg Tab, Take 1 tablet by mouth once daily. (Patient not taking: Reported on 3/23/2023), Disp: 30 tablet, Rfl: 3    iron-vitamin C 100-250 mg, ICAR-C, (ICAR-C) 100-250 mg Tab, Take 1 tablet by mouth 2 (two) times a day. (Patient not taking: Reported on 3/16/2023), Disp: 60 tablet, Rfl: 3    metoprolol succinate (TOPROL-XL) 25 MG 24 hr tablet, Take 1 tablet (25 mg total) by mouth once daily. (Patient not taking: Reported on 3/23/2023), Disp: 30 tablet, Rfl: 1    prenatal vit no.124/iron/folic (PRENATAL VITAMIN ORAL), Take 1 tablet by mouth once daily., Disp: , Rfl:     sulfamethoxazole-trimethoprim 800-160mg (BACTRIM DS) 800-160 mg Tab, Take 1 tablet by mouth 2 (two) times daily. for 7 days, Disp: 14 tablet, Rfl: 0      Physical Exam:  /64   Temp 98.2 °F (36.8 °C)   Ht 5' 1" (1.549 m)   Wt 54.3 kg (119 lb 12.8 oz)   LMP 2022 Comment: spotting since delivery- 3/7/23  Breastfeeding No   BMI 22.64 kg/m²      Chaperone present.     Constitutional: General appearance: healthy, well-nourished and well-developed   Psychiatric: Orientation to time, place and person. Normal mood and affect and        active, alert   Breast: deferred  Abdomen:  Auscultation/Inspection/Palpation: deferred   Female Genitalia:      Vulva: no masses, atrophy or lesions, 1-2cm hard nodule noted to distal left labia, tender " to palpation     Bladder/Urethra: no urethral discharge or mass, normal meatus, bladder                 non-distended.      Vagina: +tenderness to distal introitus with granulation tissue noted with mild erythema, no cystocele, rectocele, +large amount of yellow discharge noted               vaginal discharge, or vesicle(s) or ulcers                  Episiotomy site assessed per Dr. Garrett. Silver Nitrate applied to the distal introitus where granulation tissue is noted.        Assessment/Plan:  Postpartum  Patient doing well  Diet exercise encouraged  Multivitamin    Discussed with patient different contraceptive management including family-planning, barrier, oral contraceptive, patch, NuvaRing, Depo-Provera, Nexplanon, IUDs.     Patient declines contraception at this time    Educated patient it is strongly advised by American [employer] as well as Society for Maternal Fetal [employer]ists to receive the COVID 19 vaccine.     2. Dysuria  -     POCT Urinalysis, Dipstick, Automated, W/O Scope  -Urine culture sent    3. Vaginal discharge  -     POCT Wet Prep  -One swab with leuk, myco, urea, AV, BV, and CV panels    4. Infection of episiotomy  Rx: Bactrim DS BID x7 days  F/u in one week with Dr. Garrett to reassess site    Other orders  -     sulfamethoxazole-trimethoprim 800-160mg (BACTRIM DS) 800-160 mg Tab; Take 1 tablet by mouth 2 (two) times daily. for 7 days  Dispense: 14 tablet; Refill: 0

## 2023-04-22 LAB
BACTERIA SPEC ANAEROBE CULT: ABNORMAL
BACTERIA SPEC CULT: ABNORMAL
BACTERIA SPEC CULT: ABNORMAL

## 2023-04-24 ENCOUNTER — TELEPHONE (OUTPATIENT)
Dept: OBSTETRICS AND GYNECOLOGY | Facility: CLINIC | Age: 23
End: 2023-04-24
Payer: MEDICAID

## 2023-04-24 NOTE — TELEPHONE ENCOUNTER
----- Message from GREGG Vila sent at 4/24/2023 11:55 AM CDT -----  Regarding: culture  Could you ask if she needs to be treated for the anaerobic culture? She was already treated with Bactrim for the wound culture. Thanks!  ----- Message -----  From: Background User Lab  Sent: 4/20/2023   7:24 AM CDT  To: GREGG Vila

## 2023-04-26 ENCOUNTER — OFFICE VISIT (OUTPATIENT)
Dept: OBSTETRICS AND GYNECOLOGY | Facility: CLINIC | Age: 23
End: 2023-04-26
Payer: MEDICAID

## 2023-04-26 VITALS
HEIGHT: 61 IN | WEIGHT: 117.5 LBS | DIASTOLIC BLOOD PRESSURE: 72 MMHG | BODY MASS INDEX: 22.19 KG/M2 | SYSTOLIC BLOOD PRESSURE: 110 MMHG

## 2023-04-26 PROCEDURE — 99024 POSTOP FOLLOW-UP VISIT: CPT | Mod: TH,,, | Performed by: OBSTETRICS & GYNECOLOGY

## 2023-04-26 PROCEDURE — 3078F DIAST BP <80 MM HG: CPT | Mod: CPTII,,, | Performed by: OBSTETRICS & GYNECOLOGY

## 2023-04-26 PROCEDURE — 3008F BODY MASS INDEX DOCD: CPT | Mod: CPTII,,, | Performed by: OBSTETRICS & GYNECOLOGY

## 2023-04-26 PROCEDURE — 3078F PR MOST RECENT DIASTOLIC BLOOD PRESSURE < 80 MM HG: ICD-10-PCS | Mod: CPTII,,, | Performed by: OBSTETRICS & GYNECOLOGY

## 2023-04-26 PROCEDURE — 1159F PR MEDICATION LIST DOCUMENTED IN MEDICAL RECORD: ICD-10-PCS | Mod: CPTII,,, | Performed by: OBSTETRICS & GYNECOLOGY

## 2023-04-26 PROCEDURE — 3074F PR MOST RECENT SYSTOLIC BLOOD PRESSURE < 130 MM HG: ICD-10-PCS | Mod: CPTII,,, | Performed by: OBSTETRICS & GYNECOLOGY

## 2023-04-26 PROCEDURE — 1159F MED LIST DOCD IN RCRD: CPT | Mod: CPTII,,, | Performed by: OBSTETRICS & GYNECOLOGY

## 2023-04-26 PROCEDURE — 3074F SYST BP LT 130 MM HG: CPT | Mod: CPTII,,, | Performed by: OBSTETRICS & GYNECOLOGY

## 2023-04-26 PROCEDURE — 3008F PR BODY MASS INDEX (BMI) DOCUMENTED: ICD-10-PCS | Mod: CPTII,,, | Performed by: OBSTETRICS & GYNECOLOGY

## 2023-04-26 PROCEDURE — 99024 PR POST-OP FOLLOW-UP VISIT: ICD-10-PCS | Mod: TH,,, | Performed by: OBSTETRICS & GYNECOLOGY

## 2023-04-26 RX ORDER — METRONIDAZOLE 500 MG/1
500 TABLET ORAL EVERY 12 HOURS
Qty: 14 TABLET | Refills: 0 | Status: SHIPPED | OUTPATIENT
Start: 2023-04-26 | End: 2023-05-03

## 2023-04-26 NOTE — PROGRESS NOTES
"Subjective:       Robyn Guerrero is a 22 y.o. female  status post  (2023) at 39w1d presents today f/u vaginal laceration.     Wound culture:   Prevotella Bivia     OB History    Para Term  AB Living   2 1 1   1 1   SAB IAB Ectopic Multiple Live Births         0 1      # Outcome Date GA Lbr Nguyễn/2nd Weight Sex Delivery Anes PTL Lv   2 Term 23 39w1d 18:45 / 03:08 3.204 kg (7 lb 1 oz) M Vag-Vacuum EPI N MARYLU   1 AB 21 6w0d    SAB   FD         Review of Systems  General/Constitutional: Chills denies. Fatigue/weakness denies. Fever denies . Night sweats denies . Hot flashes denies  Gastrointestinal: Abdominal pain denies. Blood in stool denies. Constipation denies. Diarrhea denies. Heartburn denies. Nausea denies. Vomiting denies   Genitourinary: Incontinence denies. Blood in urine denies. Frequent urination denies.  Urgency denies. Painful urination denies. Nocturia denies   Gynecologic: Irregular menses denies.  Heavy bleeding  denies.  Painful menses denies.  Vaginal discharge denies. Vaginal odor denies. Vaginal itching/Irritation denies. Vaginal lesion denies.  Pelvic pain denies. Decreased libido denies. Vulvar lesion denies. Prolapse of genital organs denies. Painful intercourse denies. Postcoital bleeding denies   Psychiatric: Mood lability denies.  Depressed mood denies. Suicidal thoughts denies. Anxiety denies.  Overwhelmed denies.  Appetite normal. Energy level normal       Physical Exam:   /72 (BP Location: Right arm, Patient Position: Sitting)   Ht 5' 1" (1.549 m)   Wt 53.3 kg (117 lb 8.1 oz)   LMP 2022   Breastfeeding No   BMI 22.20 kg/m²      Chaperone present.     Female Genitalia:      Vulva: healing well.   in mid perineum.       Assessment:     1. Infection of episiotomy         Plan:     Cont postpartum restrictions.   Rx: Flagyl BID 500mg x7 days  RTC for 6 wk PP visit      "

## 2023-05-12 ENCOUNTER — TELEPHONE (OUTPATIENT)
Dept: OBSTETRICS AND GYNECOLOGY | Facility: CLINIC | Age: 23
End: 2023-05-12
Payer: MEDICAID

## 2023-05-12 NOTE — TELEPHONE ENCOUNTER
----- Message from Brylee Guillory sent at 5/12/2023 11:23 AM CDT -----  Contact: Robyn De Luna was calling wondering if she needs to come for a follow up. She finished her antibiotics yesterday and was not sure if he needed to recheck her stitches due to having a previous infection.    Call back number: [phone]

## 2023-06-19 ENCOUNTER — HOSPITAL ENCOUNTER (EMERGENCY)
Facility: HOSPITAL | Age: 23
Discharge: HOME | End: 2023-06-20
Attending: EMERGENCY MEDICINE
Payer: MEDICAID

## 2023-06-19 DIAGNOSIS — R00.0 RAPID HEART BEAT: ICD-10-CM

## 2023-06-19 DIAGNOSIS — E86.0 DEHYDRATION: Primary | ICD-10-CM

## 2023-06-19 DIAGNOSIS — K52.9 GASTROENTERITIS: ICD-10-CM

## 2023-06-19 LAB
ALBUMIN SERPL-MCNC: 4.5 G/DL (ref 3.5–5)
ALBUMIN/GLOB SERPL: 1.2 RATIO (ref 1.1–2)
ALP SERPL-CCNC: 83 UNIT/L (ref 40–150)
ALT SERPL-CCNC: 16 UNIT/L (ref 0–55)
AMYLASE SERPL-CCNC: 51 UNIT/L (ref 25–125)
AST SERPL-CCNC: 16 UNIT/L (ref 5–34)
B-HCG FREE SERPL-ACNC: <2.42 MIU/ML
BASOPHILS # BLD AUTO: 0.03 X10(3)/MCL
BASOPHILS NFR BLD AUTO: 0.3 %
BILIRUBIN DIRECT+TOT PNL SERPL-MCNC: 1 MG/DL
BNP BLD-MCNC: <10 PG/ML
BUN SERPL-MCNC: 11.8 MG/DL (ref 7–18.7)
CALCIUM SERPL-MCNC: 9.9 MG/DL (ref 8.4–10.2)
CHLORIDE SERPL-SCNC: 104 MMOL/L (ref 98–107)
CO2 SERPL-SCNC: 24 MMOL/L (ref 22–29)
CREAT SERPL-MCNC: 0.68 MG/DL (ref 0.55–1.02)
EOSINOPHIL # BLD AUTO: 0 X10(3)/MCL (ref 0–0.9)
EOSINOPHIL NFR BLD AUTO: 0 %
ERYTHROCYTE [DISTWIDTH] IN BLOOD BY AUTOMATED COUNT: 13.2 % (ref 11.5–17)
GFR SERPLBLD CREATININE-BSD FMLA CKD-EPI: >60 MLS/MIN/1.73/M2
GLOBULIN SER-MCNC: 3.7 GM/DL (ref 2.4–3.5)
GLUCOSE SERPL-MCNC: 139 MG/DL (ref 74–100)
HCT VFR BLD AUTO: 40 % (ref 37–47)
HGB BLD-MCNC: 13.7 G/DL (ref 12–16)
IMM GRANULOCYTES # BLD AUTO: 0.03 X10(3)/MCL (ref 0–0.04)
IMM GRANULOCYTES NFR BLD AUTO: 0.3 %
LIPASE SERPL-CCNC: 12 U/L
LYMPHOCYTES # BLD AUTO: 0.61 X10(3)/MCL (ref 0.6–4.6)
LYMPHOCYTES NFR BLD AUTO: 5.6 %
MCH RBC QN AUTO: 29 PG (ref 27–31)
MCHC RBC AUTO-ENTMCNC: 34.3 G/DL (ref 33–36)
MCV RBC AUTO: 84.6 FL (ref 80–94)
MONOCYTES # BLD AUTO: 0.29 X10(3)/MCL (ref 0.1–1.3)
MONOCYTES NFR BLD AUTO: 2.7 %
NEUTROPHILS # BLD AUTO: 9.93 X10(3)/MCL (ref 2.1–9.2)
NEUTROPHILS NFR BLD AUTO: 91.1 %
NRBC BLD AUTO-RTO: 0 %
PLATELET # BLD AUTO: 227 X10(3)/MCL (ref 130–400)
PMV BLD AUTO: 10.8 FL (ref 7.4–10.4)
POTASSIUM SERPL-SCNC: 4 MMOL/L (ref 3.5–5.1)
PROT SERPL-MCNC: 8.2 GM/DL (ref 6.4–8.3)
RBC # BLD AUTO: 4.73 X10(6)/MCL (ref 4.2–5.4)
SODIUM SERPL-SCNC: 142 MMOL/L (ref 136–145)
TROPONIN I SERPL-MCNC: <0.01 NG/ML (ref 0–0.04)
TSH SERPL-ACNC: 0.76 UIU/ML (ref 0.35–4.94)
WBC # SPEC AUTO: 10.89 X10(3)/MCL (ref 4.5–11.5)

## 2023-06-19 PROCEDURE — 84443 ASSAY THYROID STIM HORMONE: CPT | Performed by: EMERGENCY MEDICINE

## 2023-06-19 PROCEDURE — 80053 COMPREHEN METABOLIC PANEL: CPT | Performed by: EMERGENCY MEDICINE

## 2023-06-19 PROCEDURE — 83880 ASSAY OF NATRIURETIC PEPTIDE: CPT | Performed by: EMERGENCY MEDICINE

## 2023-06-19 PROCEDURE — 0241U COVID/RSV/FLU A&B PCR: CPT | Performed by: EMERGENCY MEDICINE

## 2023-06-19 PROCEDURE — 25000003 PHARM REV CODE 250: Performed by: EMERGENCY MEDICINE

## 2023-06-19 PROCEDURE — 83690 ASSAY OF LIPASE: CPT | Performed by: EMERGENCY MEDICINE

## 2023-06-19 PROCEDURE — 84484 ASSAY OF TROPONIN QUANT: CPT | Performed by: EMERGENCY MEDICINE

## 2023-06-19 PROCEDURE — 85025 COMPLETE CBC W/AUTO DIFF WBC: CPT | Performed by: EMERGENCY MEDICINE

## 2023-06-19 PROCEDURE — 63600175 PHARM REV CODE 636 W HCPCS: Performed by: EMERGENCY MEDICINE

## 2023-06-19 PROCEDURE — 96374 THER/PROPH/DIAG INJ IV PUSH: CPT

## 2023-06-19 PROCEDURE — 93005 ELECTROCARDIOGRAM TRACING: CPT

## 2023-06-19 PROCEDURE — 96361 HYDRATE IV INFUSION ADD-ON: CPT

## 2023-06-19 PROCEDURE — 84702 CHORIONIC GONADOTROPIN TEST: CPT | Performed by: EMERGENCY MEDICINE

## 2023-06-19 PROCEDURE — 82150 ASSAY OF AMYLASE: CPT | Performed by: EMERGENCY MEDICINE

## 2023-06-19 PROCEDURE — 99285 EMERGENCY DEPT VISIT HI MDM: CPT | Mod: 25

## 2023-06-19 PROCEDURE — 81001 URINALYSIS AUTO W/SCOPE: CPT | Performed by: EMERGENCY MEDICINE

## 2023-06-19 RX ORDER — SODIUM CHLORIDE 9 MG/ML
1000 INJECTION, SOLUTION INTRAVENOUS
Status: COMPLETED | OUTPATIENT
Start: 2023-06-19 | End: 2023-06-19

## 2023-06-19 RX ORDER — ONDANSETRON 2 MG/ML
4 INJECTION INTRAMUSCULAR; INTRAVENOUS ONCE
Status: COMPLETED | OUTPATIENT
Start: 2023-06-19 | End: 2023-06-19

## 2023-06-19 RX ADMIN — SODIUM CHLORIDE 1000 ML: 9 INJECTION, SOLUTION INTRAVENOUS at 09:06

## 2023-06-19 RX ADMIN — ONDANSETRON 4 MG: 2 INJECTION INTRAMUSCULAR; INTRAVENOUS at 10:06

## 2023-06-20 VITALS
HEART RATE: 102 BPM | SYSTOLIC BLOOD PRESSURE: 117 MMHG | TEMPERATURE: 98 F | HEIGHT: 64 IN | BODY MASS INDEX: 19.91 KG/M2 | WEIGHT: 116.63 LBS | RESPIRATION RATE: 18 BRPM | OXYGEN SATURATION: 97 % | DIASTOLIC BLOOD PRESSURE: 64 MMHG

## 2023-06-20 LAB
APPEARANCE UR: CLEAR
BACTERIA #/AREA URNS AUTO: ABNORMAL /HPF
BILIRUB UR QL STRIP.AUTO: NEGATIVE MG/DL
COLOR UR: ABNORMAL
FLUAV AG UPPER RESP QL IA.RAPID: NOT DETECTED
FLUBV AG UPPER RESP QL IA.RAPID: NOT DETECTED
GLUCOSE UR QL STRIP.AUTO: NORMAL MG/DL
HYALINE CASTS #/AREA URNS LPF: ABNORMAL /LPF
KETONES UR QL STRIP.AUTO: ABNORMAL MG/DL
LEUKOCYTE ESTERASE UR QL STRIP.AUTO: NEGATIVE UNIT/L
MUCOUS THREADS URNS QL MICRO: ABNORMAL /LPF
NITRITE UR QL STRIP.AUTO: NEGATIVE
PH UR STRIP.AUTO: 5.5 [PH]
PROT UR QL STRIP.AUTO: NEGATIVE MG/DL
RBC #/AREA URNS AUTO: ABNORMAL /HPF
RBC UR QL AUTO: NEGATIVE UNIT/L
RSV A 5' UTR RNA NPH QL NAA+PROBE: NOT DETECTED
SARS-COV-2 RNA RESP QL NAA+PROBE: NOT DETECTED
SP GR UR STRIP.AUTO: 1.02
SQUAMOUS #/AREA URNS LPF: ABNORMAL /HPF
UROBILINOGEN UR STRIP-ACNC: NORMAL MG/DL
WBC #/AREA URNS AUTO: ABNORMAL /HPF

## 2023-06-20 PROCEDURE — 96376 TX/PRO/DX INJ SAME DRUG ADON: CPT

## 2023-06-20 PROCEDURE — 63600175 PHARM REV CODE 636 W HCPCS: Performed by: FAMILY MEDICINE

## 2023-06-20 PROCEDURE — 25000003 PHARM REV CODE 250: Performed by: FAMILY MEDICINE

## 2023-06-20 PROCEDURE — 96372 THER/PROPH/DIAG INJ SC/IM: CPT | Mod: 59 | Performed by: FAMILY MEDICINE

## 2023-06-20 PROCEDURE — 96361 HYDRATE IV INFUSION ADD-ON: CPT

## 2023-06-20 RX ORDER — LOPERAMIDE HYDROCHLORIDE 2 MG/1
4 CAPSULE ORAL
Status: COMPLETED | OUTPATIENT
Start: 2023-06-20 | End: 2023-06-20

## 2023-06-20 RX ORDER — ONDANSETRON 4 MG/1
4 TABLET, ORALLY DISINTEGRATING ORAL EVERY 6 HOURS PRN
Qty: 10 TABLET | Refills: 0 | Status: SHIPPED | OUTPATIENT
Start: 2023-06-20 | End: 2023-06-25

## 2023-06-20 RX ORDER — DICYCLOMINE HYDROCHLORIDE 10 MG/1
10 CAPSULE ORAL EVERY 6 HOURS PRN
Qty: 20 CAPSULE | Refills: 0 | Status: SHIPPED | OUTPATIENT
Start: 2023-06-20 | End: 2023-06-30

## 2023-06-20 RX ORDER — DICYCLOMINE HYDROCHLORIDE 10 MG/ML
20 INJECTION INTRAMUSCULAR
Status: COMPLETED | OUTPATIENT
Start: 2023-06-20 | End: 2023-06-20

## 2023-06-20 RX ORDER — LOPERAMIDE HYDROCHLORIDE 2 MG/1
2 CAPSULE ORAL 4 TIMES DAILY PRN
Qty: 12 CAPSULE | Refills: 0 | Status: SHIPPED | OUTPATIENT
Start: 2023-06-20 | End: 2023-06-30

## 2023-06-20 RX ORDER — ONDANSETRON 2 MG/ML
4 INJECTION INTRAMUSCULAR; INTRAVENOUS
Status: COMPLETED | OUTPATIENT
Start: 2023-06-20 | End: 2023-06-20

## 2023-06-20 RX ORDER — MAG HYDROX/ALUMINUM HYD/SIMETH 200-200-20
30 SUSPENSION, ORAL (FINAL DOSE FORM) ORAL
Status: COMPLETED | OUTPATIENT
Start: 2023-06-20 | End: 2023-06-20

## 2023-06-20 RX ADMIN — SODIUM CHLORIDE 1000 ML: 9 INJECTION, SOLUTION INTRAVENOUS at 12:06

## 2023-06-20 RX ADMIN — ONDANSETRON 4 MG: 2 INJECTION INTRAMUSCULAR; INTRAVENOUS at 12:06

## 2023-06-20 RX ADMIN — LOPERAMIDE HYDROCHLORIDE 4 MG: 2 CAPSULE ORAL at 12:06

## 2023-06-20 RX ADMIN — ALUMINUM HYDROXIDE, MAGNESIUM HYDROXIDE, AND SIMETHICONE 30 ML: 200; 200; 20 SUSPENSION ORAL at 12:06

## 2023-06-20 RX ADMIN — DICYCLOMINE HYDROCHLORIDE 20 MG: 20 INJECTION, SOLUTION INTRAMUSCULAR at 12:06

## 2023-06-20 NOTE — ED PROVIDER NOTES
Encounter Date: 6/19/2023       History     Chief Complaint   Patient presents with    Dizziness    Diarrhea    Vomiting     Diarrhea and vomiting x 2 days. Sent from urgent care due to tachycardia. Patient reporting dizziness. Currently nauseated; vomited after Zofran admin at urgent care     Patient presenting with a 2 day history of nausea vomiting and diarrhea.  Patient went to [facility] after our urgent care and was referred to [facility] for evaluation.  Patient states in March after delivery of her baby she had a cardiac issue and has not seek follow-up yet, she did start with some shortness of breath prior to the nausea vomiting and diarrhea.      Review of patient's allergies indicates:   Allergen Reactions    House dust Hives, Itching, Nausea And Vomiting and Shortness Of Breath     Past Medical History:   Diagnosis Date    Anxiety disorder, unspecified     Irregular uterine contractions      Past Surgical History:   Procedure Laterality Date    TONSILECTOMY      TYMPANOSTOMY TUBE PLACEMENT       Family History   Problem Relation Age of Onset    Lung cancer Maternal Aunt     Uterine cancer Maternal Aunt     Breast cancer Neg Hx     Colon cancer Neg Hx     Ovarian cancer Neg Hx     Cervical cancer Neg Hx      Social History     Tobacco Use    Smoking status: Never     Passive exposure: Never    Smokeless tobacco: Never   Substance Use Topics    Alcohol use: Not Currently    Drug use: Never     Review of Systems   Constitutional:  Positive for activity change, appetite change and fatigue.   Respiratory:  Positive for shortness of breath.    Gastrointestinal:  Positive for abdominal pain, diarrhea, nausea and vomiting.   All other systems reviewed and are negative.    Physical Exam     Initial Vitals [06/19/23 2141]   BP Pulse Resp Temp SpO2   112/78 (!) 144 18 98.3 °F (36.8 °C) 99 %      MAP       --         Physical Exam    Constitutional: She appears well-developed and well-nourished.   HENT:   Head: Normocephalic.    Eyes: EOM are normal. Pupils are equal, round, and reactive to light.   Neck:   Normal range of motion.  Cardiovascular:  Regular rhythm.           Tachycardia   Pulmonary/Chest: Breath sounds normal.   Abdominal: Abdomen is soft. There is abdominal tenderness.   Epigastric tenderness, no rebound no guarding   Musculoskeletal:         General: Normal range of motion.      Cervical back: Normal range of motion.     Skin: Skin is warm and dry.   Psychiatric: She has a normal mood and affect.       ED Course   Procedures  Labs Reviewed   COMPREHENSIVE METABOLIC PANEL - Abnormal; Notable for the following components:       Result Value    Glucose Level 139 (*)     Globulin 3.7 (*)     All other components within normal limits   URINALYSIS, REFLEX TO URINE CULTURE - Abnormal; Notable for the following components:    Ketones, UA 3+ (*)     Squamous Epithelial Cells, UA Trace (*)     Mucous, UA Occ (*)     All other components within normal limits   CBC WITH DIFFERENTIAL - Abnormal; Notable for the following components:    MPV 10.8 (*)     Neut # 9.93 (*)     All other components within normal limits   COVID/RSV/FLU A&B PCR - Normal    Narrative:     The Xpert Xpress SARS-CoV-2/FLU/RSV plus is a rapid, multiplexed real-time PCR test intended for the simultaneous qualitative detection and differentiation of SARS-CoV-2, Influenza A, Influenza B, and respiratory syncytial virus (RSV) viral RNA in either nasopharyngeal swab or nasal swab specimens.         LIPASE - Normal   AMYLASE - Normal   HCG, QUANTITATIVE - Normal   TSH - Normal   B-TYPE NATRIURETIC PEPTIDE - Normal   TROPONIN I - Normal   CBC W/ AUTO DIFFERENTIAL    Narrative:     The following orders were created for panel order CBC auto differential.  Procedure                               Abnormality         Status                     ---------                               -----------         ------                     CBC with Differential[[id number]]         Abnormal            Final result                 Please view results for these tests on the individual orders.   EXTRA TUBES    Narrative:     The following orders were created for panel order EXTRA TUBES.  Procedure                               Abnormality         Status                     ---------                               -----------         ------                     Light Blue Top Hold[[id number]]                              In process                   Please view results for these tests on the individual orders.   LIGHT BLUE TOP HOLD     EKG Readings: (Independently Interpreted)   EKG June 19, 2023, time 9:38 p.m., rate 144, sinus tachycardia, normal axis, nonspecific ST wave changes, no STEMI     Imaging Results              X-Ray Chest AP Portable (Preliminary result)  Result time 06/19/23 23:58:21      Wet Read by Kody Hawthorne MD (06/19/23 23:58:21, [facility], Emergency Medicine)    No acute abnormality appreciated.                                     Medications   loperamide capsule 4 mg (4 mg Oral Incomplete 6/20/23 0058)   aluminum-magnesium hydroxide-simethicone 200-200-20 mg/5 mL suspension 30 mL (30 mLs Oral Incomplete 6/20/23 0058)   0.9%  NaCl infusion (1,000 mLs Intravenous New Bag 6/19/23 2157)   0.9%  NaCl infusion (1,000 mLs Intravenous New Bag 6/19/23 2157)   ondansetron injection 4 mg (4 mg Intravenous Given 6/19/23 2203)   sodium chloride 0.9% bolus 1,000 mL 1,000 mL (1,000 mLs Intravenous New Bag 6/20/23 0014)   dicyclomine injection 20 mg (20 mg Intramuscular Given 6/20/23 0014)   ondansetron injection 4 mg (4 mg Intravenous Given 6/20/23 0014)     Medical Decision Making:   ED Management:  Care will be transferred over to Dr. Kody Hawthorne at 11:00 p.m. on June 19, 2023           ED Course as of 06/20/23 0247 Mon Jun 19, 2023   2357 Thyroid Stimulating Hormone: 0.755 [MW]   2357 HCG Quant: <2.42 [MW]   2357 WBC: 10.89 [MW]   2357  Hemoglobin: 13.7 [MW]   2357 Hematocrit: 40.0 [MW]   2357 Platelets: 227 [MW]   2357 Troponin I: <0.010 [MW]   2357 BNP: <10.0 [MW]   2357 Amylase: 51 [MW]   2357 Lipase: 12 [MW]   2357 Sodium: 142 [MW]   2357 Potassium: 4.0 [MW]   2357 Chloride: 104 [MW]   2357 CO2: 24 [MW]   2357 Glucose(!): 139 [MW]   2357 BUN: 11.8 [MW]   2357 Creatinine: 0.68 [MW]   2357 Calcium: 9.9 [MW]   2357 PROTEIN TOTAL: 8.2 [MW]   2357 Albumin: 4.5 [MW]   2357 Globulin, Total(!): 3.7 [MW]   2357 ALT: 16 [MW]   2357 AST: 16 [MW]   2357 eGFR: >60 [MW]   2357 Alkaline Phosphatase: 83 [MW]   Tue Jun 20, 2023   0025 Color, UA: Light-Yellow [MW]   0025 Appearance, UA: Clear [MW]   0025 Specific Gravity,UA: 1.017 [MW]   0025 pH, UA: 5.5 [MW]   0025 Protein, UA: Negative [MW]   0025 Glucose, UA: Normal [MW]   0025 Ketones, UA(!): 3+ [MW]   0025 Occult Blood UA: Negative [MW]   0025 Bilirubin, UA: Negative [MW]   0025 Urobilinogen, UA: Normal [MW]   0025 NITRITE UA: Negative [MW]   0025 Leukocytes, UA: Negative [MW]   0025 WBC, UA: 0-5 [MW]   0025 NITRITE UA: Negative [MW]   0244 Feeling much improved; stable for discharge to home. [MW]      ED Course User Index  [MW] Kody Hawthorne MD                 Clinical Impression:   Final diagnoses:  [R00.0] Rapid heart beat  [E86.0] Dehydration (Primary)  [K52.9] Gastroenteritis        ED Disposition Condition    Discharge Stable          ED Prescriptions       Medication Sig Dispense Start Date End Date Auth. Provider    ondansetron (ZOFRAN-ODT) 4 MG TbDL Take 1 tablet (4 mg total) by mouth every 6 (six) hours as needed (nausea vomiting). 10 tablet 6/20/2023 6/25/2023 Kody Hawthorne MD    dicyclomine (BENTYL) 10 MG capsule Take 1 capsule (10 mg total) by mouth every 6 (six) hours as needed (abdominal cramping). 20 capsule 6/20/2023 6/30/2023 Kody Hawthorne MD    loperamide (IMODIUM) 2 mg capsule Take 1 capsule (2 mg total) by mouth 4 (four) times daily as needed for Diarrhea. 12  capsule 6/20/2023 6/30/2023 Kody Hawthorne MD          Follow-up Information       Follow up With Specialties Details Why Contact Info    [facility] Emergency Medicine  As needed, If symptoms worsen [address] [facility] [id number] [phone]    Primary Care Physician  In 5 days               Antwon Up MD  06/19/23 [id number]       Kody Hawthorne MD  06/20/23 2163

## 2023-12-13 ENCOUNTER — HOSPITAL ENCOUNTER (EMERGENCY)
Facility: HOSPITAL | Age: 23
Discharge: HOME | End: 2023-12-13
Attending: FAMILY MEDICINE
Payer: MEDICAID

## 2023-12-13 VITALS
BODY MASS INDEX: 19.72 KG/M2 | TEMPERATURE: 98 F | WEIGHT: 115.5 LBS | SYSTOLIC BLOOD PRESSURE: 108 MMHG | DIASTOLIC BLOOD PRESSURE: 72 MMHG | HEIGHT: 64 IN | OXYGEN SATURATION: 99 % | RESPIRATION RATE: 20 BRPM | HEART RATE: 101 BPM

## 2023-12-13 DIAGNOSIS — R00.2 PALPITATIONS: ICD-10-CM

## 2023-12-13 DIAGNOSIS — K52.9 GASTROENTERITIS: Primary | ICD-10-CM

## 2023-12-13 LAB
ALBUMIN SERPL-MCNC: 4.6 G/DL (ref 3.5–5)
ALBUMIN/GLOB SERPL: 1.2 RATIO (ref 1.1–2)
ALP SERPL-CCNC: 67 UNIT/L (ref 40–150)
ALT SERPL-CCNC: 10 UNIT/L (ref 0–55)
APPEARANCE UR: CLEAR
AST SERPL-CCNC: 14 UNIT/L (ref 5–34)
B-HCG UR QL: NEGATIVE
BACTERIA #/AREA URNS AUTO: ABNORMAL /HPF
BASOPHILS # BLD AUTO: 0.03 X10(3)/MCL
BASOPHILS NFR BLD AUTO: 0.2 %
BILIRUB SERPL-MCNC: 1.2 MG/DL
BILIRUB UR QL STRIP.AUTO: NEGATIVE
BUN SERPL-MCNC: 14.2 MG/DL (ref 7–18.7)
CALCIUM SERPL-MCNC: 9.6 MG/DL (ref 8.4–10.2)
CHLORIDE SERPL-SCNC: 106 MMOL/L (ref 98–107)
CO2 SERPL-SCNC: 21 MMOL/L (ref 22–29)
COLOR UR AUTO: YELLOW
CREAT SERPL-MCNC: 0.71 MG/DL (ref 0.55–1.02)
CTP QC/QA: YES
EOSINOPHIL # BLD AUTO: 0 X10(3)/MCL (ref 0–0.9)
EOSINOPHIL NFR BLD AUTO: 0 %
ERYTHROCYTE [DISTWIDTH] IN BLOOD BY AUTOMATED COUNT: 12.3 % (ref 11.5–17)
GFR SERPLBLD CREATININE-BSD FMLA CKD-EPI: >60 MLS/MIN/1.73/M2
GLOBULIN SER-MCNC: 3.8 GM/DL (ref 2.4–3.5)
GLUCOSE SERPL-MCNC: 139 MG/DL (ref 74–100)
GLUCOSE UR QL STRIP.AUTO: NORMAL
HCT VFR BLD AUTO: 44.5 % (ref 37–47)
HGB BLD-MCNC: 14.8 G/DL (ref 12–16)
HYALINE CASTS #/AREA URNS LPF: ABNORMAL /LPF
IMM GRANULOCYTES # BLD AUTO: 0.03 X10(3)/MCL (ref 0–0.04)
IMM GRANULOCYTES NFR BLD AUTO: 0.2 %
KETONES UR QL STRIP.AUTO: ABNORMAL
LEUKOCYTE ESTERASE UR QL STRIP.AUTO: NEGATIVE
LIPASE SERPL-CCNC: 13 U/L
LYMPHOCYTES # BLD AUTO: 0.51 X10(3)/MCL (ref 0.6–4.6)
LYMPHOCYTES NFR BLD AUTO: 3.9 %
MAGNESIUM SERPL-MCNC: 1.8 MG/DL (ref 1.6–2.6)
MCH RBC QN AUTO: 29.2 PG (ref 27–31)
MCHC RBC AUTO-ENTMCNC: 33.3 G/DL (ref 33–36)
MCV RBC AUTO: 87.9 FL (ref 80–94)
MONOCYTES # BLD AUTO: 0.35 X10(3)/MCL (ref 0.1–1.3)
MONOCYTES NFR BLD AUTO: 2.7 %
MUCOUS THREADS URNS QL MICRO: ABNORMAL /LPF
NEUTROPHILS # BLD AUTO: 12.24 X10(3)/MCL (ref 2.1–9.2)
NEUTROPHILS NFR BLD AUTO: 93 %
NITRITE UR QL STRIP.AUTO: NEGATIVE
NRBC BLD AUTO-RTO: 0 %
PH UR STRIP.AUTO: 5.5 [PH]
PLATELET # BLD AUTO: 246 X10(3)/MCL (ref 130–400)
PMV BLD AUTO: 10.6 FL (ref 7.4–10.4)
POTASSIUM SERPL-SCNC: 4 MMOL/L (ref 3.5–5.1)
PROT SERPL-MCNC: 8.4 GM/DL (ref 6.4–8.3)
PROT UR QL STRIP.AUTO: ABNORMAL
RBC # BLD AUTO: 5.06 X10(6)/MCL (ref 4.2–5.4)
RBC #/AREA URNS AUTO: ABNORMAL /HPF
RBC UR QL AUTO: NEGATIVE
SODIUM SERPL-SCNC: 139 MMOL/L (ref 136–145)
SP GR UR STRIP.AUTO: 1.03 (ref 1–1.03)
SQUAMOUS #/AREA URNS LPF: ABNORMAL /HPF
UROBILINOGEN UR STRIP-ACNC: NORMAL
WBC # SPEC AUTO: 13.16 X10(3)/MCL (ref 4.5–11.5)
WBC #/AREA URNS AUTO: ABNORMAL /HPF

## 2023-12-13 PROCEDURE — 81025 URINE PREGNANCY TEST: CPT | Performed by: FAMILY MEDICINE

## 2023-12-13 PROCEDURE — 96375 TX/PRO/DX INJ NEW DRUG ADDON: CPT

## 2023-12-13 PROCEDURE — 81001 URINALYSIS AUTO W/SCOPE: CPT

## 2023-12-13 PROCEDURE — 96374 THER/PROPH/DIAG INJ IV PUSH: CPT

## 2023-12-13 PROCEDURE — 96361 HYDRATE IV INFUSION ADD-ON: CPT

## 2023-12-13 PROCEDURE — 80053 COMPREHEN METABOLIC PANEL: CPT

## 2023-12-13 PROCEDURE — 99284 EMERGENCY DEPT VISIT MOD MDM: CPT | Mod: 25

## 2023-12-13 PROCEDURE — 85025 COMPLETE CBC W/AUTO DIFF WBC: CPT

## 2023-12-13 PROCEDURE — 63600175 PHARM REV CODE 636 W HCPCS

## 2023-12-13 PROCEDURE — 93005 ELECTROCARDIOGRAM TRACING: CPT

## 2023-12-13 PROCEDURE — 83690 ASSAY OF LIPASE: CPT

## 2023-12-13 PROCEDURE — 83735 ASSAY OF MAGNESIUM: CPT

## 2023-12-13 RX ORDER — KETOROLAC TROMETHAMINE 30 MG/ML
30 INJECTION, SOLUTION INTRAMUSCULAR; INTRAVENOUS ONCE
Status: COMPLETED | OUTPATIENT
Start: 2023-12-13 | End: 2023-12-13

## 2023-12-13 RX ORDER — ONDANSETRON 4 MG/1
4 TABLET, ORALLY DISINTEGRATING ORAL ONCE AS NEEDED
Qty: 6 TABLET | Refills: 0 | Status: SHIPPED | OUTPATIENT
Start: 2023-12-13 | End: 2024-01-10

## 2023-12-13 RX ORDER — DICYCLOMINE HYDROCHLORIDE 10 MG/1
10 CAPSULE ORAL EVERY 6 HOURS PRN
Qty: 20 CAPSULE | Refills: 0 | Status: SHIPPED | OUTPATIENT
Start: 2023-12-13 | End: 2024-01-10

## 2023-12-13 RX ORDER — ONDANSETRON 2 MG/ML
8 INJECTION INTRAMUSCULAR; INTRAVENOUS ONCE
Status: COMPLETED | OUTPATIENT
Start: 2023-12-13 | End: 2023-12-13

## 2023-12-13 RX ADMIN — SODIUM CHLORIDE, POTASSIUM CHLORIDE, SODIUM LACTATE AND CALCIUM CHLORIDE 1000 ML: 600; 310; 30; 20 INJECTION, SOLUTION INTRAVENOUS at 03:12

## 2023-12-13 RX ADMIN — KETOROLAC TROMETHAMINE 30 MG: 30 INJECTION, SOLUTION INTRAMUSCULAR; INTRAVENOUS at 04:12

## 2023-12-13 RX ADMIN — ONDANSETRON 8 MG: 2 INJECTION INTRAMUSCULAR; INTRAVENOUS at 03:12

## 2023-12-13 NOTE — ED PROVIDER NOTES
Encounter Date: 12/13/2023       History     Chief Complaint   Patient presents with    Palpitations    Vomiting     States vomiting since last am.  Tonight started feelinc chest pain, SOB and palpitations.  States Hx tachycardia.      Shortness of Breath     23-year-old with known past medical history who presents today to the emergency department for nausea, vomiting and abdominal pain.  She reports nausea and vomiting started mid day.  She reports no change in her diet.  Patient reports she has had about 10 bouts of emesis since noon.  She did take a saw we will Zofran 4 mg on relief.  Patient also reporting concomitant diarrhea.  Diarrhea is watery, pale, and mucousy.  The abdominal pain is reported to in the epigastric and right upper quadrant region and radiates towards her back.  She reports the pain has a constricting pressure-like feeling.  Patient does report recently being in emergency department for son but denies any other sick contacts.  She has not currently on any daily medications.  She denies any dysuria, polyuria, groin pain, coughing, rhinorrhea, headaches, or subjective fevers.        Review of patient's allergies indicates:   Allergen Reactions    House dust Hives, Itching, Nausea And Vomiting and Shortness Of Breath     Past Medical History:   Diagnosis Date    Anxiety disorder, unspecified     Irregular uterine contractions      Past Surgical History:   Procedure Laterality Date    TONSILECTOMY      TYMPANOSTOMY TUBE PLACEMENT       Family History   Problem Relation Age of Onset    Lung cancer Maternal Aunt     Uterine cancer Maternal Aunt     Breast cancer Neg Hx     Colon cancer Neg Hx     Ovarian cancer Neg Hx     Cervical cancer Neg Hx      Social History     Tobacco Use    Smoking status: Never     Passive exposure: Never    Smokeless tobacco: Never   Substance Use Topics    Alcohol use: Not Currently    Drug use: Never     Comment: CBD use     Review of Systems   Constitutional:   Negative for activity change, chills, diaphoresis, fatigue and fever.   HENT:  Negative for congestion, rhinorrhea, sinus pain, sneezing and sore throat.    Eyes:  Negative for photophobia, redness and visual disturbance.   Respiratory:  Negative for apnea, cough, choking and chest tightness.    Cardiovascular:  Positive for palpitations. Negative for chest pain and leg swelling.   Gastrointestinal:  Positive for abdominal pain, diarrhea, nausea and vomiting. Negative for abdominal distention, anal bleeding, blood in stool and rectal pain.   Genitourinary:  Positive for flank pain. Negative for difficulty urinating, dyspareunia, dysuria, urgency and vaginal pain.   Musculoskeletal:  Negative for arthralgias, back pain, gait problem, joint swelling, myalgias and neck pain.   Neurological:  Negative for dizziness, facial asymmetry, light-headedness, numbness and headaches.       Physical Exam     Initial Vitals [12/13/23 0246]   BP Pulse Resp Temp SpO2   99/62 (!) 143 19 98.4 °F (36.9 °C) 100 %      MAP       --         Physical Exam    Constitutional: She appears well-developed and well-nourished.   HENT:   Head: Normocephalic and atraumatic.   Eyes: Conjunctivae and EOM are normal. Right eye exhibits no discharge. Left eye exhibits no discharge.   Cardiovascular:  Regular rhythm and normal heart sounds.     Exam reveals no gallop and no friction rub.       No murmur heard.  Tachycardia   Pulmonary/Chest: No respiratory distress. She has no wheezes. She has no rhonchi. She has no rales.   Abdominal: Abdomen is soft. Bowel sounds are normal. She exhibits no distension. There is abdominal tenderness.   Epigastric and right upper quadrant tenderness.  Flank tenderness to palpation There is no rebound and no guarding.   Musculoskeletal:         General: No tenderness or edema. Normal range of motion.     Neurological: She is alert and oriented to person, place, and time.   Skin: Skin is warm and dry. Capillary refill  takes less than 2 seconds. No erythema. No pallor.         ED Course   Procedures  Labs Reviewed   COMPREHENSIVE METABOLIC PANEL - Abnormal; Notable for the following components:       Result Value    Carbon Dioxide 21 (*)     Glucose Level 139 (*)     Protein Total 8.4 (*)     Globulin 3.8 (*)     All other components within normal limits   CBC WITH DIFFERENTIAL - Abnormal; Notable for the following components:    WBC 13.16 (*)     MPV 10.6 (*)     Lymph # 0.51 (*)     Neut # 12.24 (*)     All other components within normal limits   URINALYSIS, REFLEX TO URINE CULTURE - Abnormal; Notable for the following components:    Specific Gravity, UA 1.034 (*)     Protein, UA 1+ (*)     Ketones, UA 4+ (*)     Squamous Epithelial Cells, UA Few (*)     Mucous, UA Moderate (*)     All other components within normal limits   LIPASE - Normal   MAGNESIUM - Normal   CBC W/ AUTO DIFFERENTIAL    Narrative:     The following orders were created for panel order CBC Auto Differential.  Procedure                               Abnormality         Status                     ---------                               -----------         ------                     CBC with Differential[[id number]]        Abnormal            Final result                 Please view results for these tests on the individual orders.   POCT URINE PREGNANCY     EKG Readings: (Independently Interpreted)   Rhythm: Sinus Tachycardia.     ECG Results              EKG 12-lead (Final result)  Result time 12/13/23 09:19:27      Final result by Interface, Lab In [facility] (12/13/23 09:19:27)                   Narrative:    Test Reason : R00.2,    Vent. Rate : 116 BPM     Atrial Rate : 116 BPM     P-R Int : 130 ms          QRS Dur : 072 ms      QT Int : 302 ms       P-R-T Axes : 071 080 -42 degrees     QTc Int : 419 ms    Sinus tachycardia  Abnormal QRS-T angle, consider primary T wave abnormality  Abnormal ECG  When compared with ECG of 19-JUN-2023 21:38,  No significant  change was found  Confirmed by Carlton Deleon MD ([id number]) on 12/13/2023 9:19:21 AM    Referred By: [email or web address]   SELF           Confirmed By:Cartlon Deleon MD                                  Imaging Results    None          Medications   lactated ringers bolus 1,000 mL (0 mLs Intravenous Stopped 12/13/23 9858)   ondansetron injection 8 mg (8 mg Intravenous Given 12/13/23 2338)   ketorolac injection 30 mg (30 mg Intravenous Given 12/13/23 0402)     Medical Decision Making  23-year-old presents to the ED with acute complaint of nausea, vomiting, abdominal pain.  She also reporting diarrhea.  On physical examination right upper quadrant tenderness as well as flank tenderness was present.  Sinus tachycardia also present.    We will obtain EKG to evaluate sinus tachycardia.  CBC, CMP, lipase, ordered.  Zofran 8 mg IV given for nausea and vomiting.  We will bolus 1000 mL of LR.  Toradol IV given for abdominal pain.     Pregnancy test negative, lipase within normal limits, UA unremarkable.  Patient reports resolution of nausea and vomiting with Zofran and rehydration.  Patient likely suffering from viral gastroenteritis.  Patient stable for discharge.    Amount and/or Complexity of Data Reviewed  Labs: ordered. Decision-making details documented in ED Course.  ECG/medicine tests: ordered and independent interpretation performed. Decision-making details documented in ED Course.    Risk  Prescription drug management.      Additional MDM:   Differential Diagnosis:   Other: The following diagnoses were also considered and will be evaluated: Gastroenteritis, Pyelonephritis and Pancreatitis. Cholecystitis, gastritis, among others            Attending Attestation:   Physician Attestation Statement for Resident:  As the supervising MD   Physician Attestation Statement: I have personally seen and examined this patient.   I agree with the above history.  -:   As the supervising MD I agree with the above PE.     As the supervising MD I  agree with the above treatment, course, plan, and disposition.    I have reviewed and agree with the residents interpretation of the following: lab data.                                        Clinical Impression:  Final diagnoses:  [R00.2] Palpitations  [K52.9] Gastroenteritis (Primary)          ED Disposition Condition    Discharge Stable          ED Prescriptions       Medication Sig Dispense Start Date End Date Auth. Provider    dicyclomine (BENTYL) 10 MG capsule Take 1 capsule (10 mg total) by mouth every 6 (six) hours as needed (for cramping). 20 capsule 12/13/2023 1/12/2024 Payam Lentz MD    ondansetron (ZOFRAN-ODT) 4 MG TbDL Take 1 tablet (4 mg total) by mouth 1 (one) time if needed (as needed for nause and vomiting). 6 tablet 12/13/2023 -- Payam Lentz MD          Follow-up Information       Follow up With Specialties Details Why Contact Info    [facility] In 2 weeks  [address] [facility] [address]  [phone]               Payam Lentz MD  Resident  12/13/23 2365       Payam Lentz MD  Resident  12/13/23 6356       Kody Hawthorne MD  12/13/23 3854

## 2023-12-13 NOTE — DISCHARGE INSTRUCTIONS
Take Bentyl as needed every 6 hours for cramping abdominal pain.  Continue to take fluids by mouth endorsed a dehydrated.  Take Zofran  disintegrated tablets as needed for nausea and vomiting

## 2024-01-08 ENCOUNTER — HOSPITAL ENCOUNTER (OUTPATIENT)
Facility: HOSPITAL | Age: 24
Discharge: HOME | End: 2024-01-10
Attending: STUDENT IN AN ORGANIZED HEALTH CARE EDUCATION/TRAINING PROGRAM | Admitting: INTERNAL MEDICINE
Payer: MEDICAID

## 2024-01-08 DIAGNOSIS — R00.0 SINUS TACHYCARDIA: ICD-10-CM

## 2024-01-08 DIAGNOSIS — D61.818 PANCYTOPENIA: ICD-10-CM

## 2024-01-08 DIAGNOSIS — R10.9 ABDOMINAL PAIN, UNSPECIFIED ABDOMINAL LOCATION: ICD-10-CM

## 2024-01-08 DIAGNOSIS — U07.1 COVID-19: Primary | ICD-10-CM

## 2024-01-08 DIAGNOSIS — R00.0 SINUS TACHYCARDIA BY ELECTROCARDIOGRAPHY: ICD-10-CM

## 2024-01-08 DIAGNOSIS — I45.5 SINUS PAUSE: ICD-10-CM

## 2024-01-08 DIAGNOSIS — R07.9 CHEST PAIN: ICD-10-CM

## 2024-01-08 DIAGNOSIS — R06.00 DYSPNEA: ICD-10-CM

## 2024-01-08 LAB
ALBUMIN SERPL-MCNC: 4.4 G/DL (ref 3.5–5)
ALBUMIN/GLOB SERPL: 1.4 RATIO (ref 1.1–2)
ALP SERPL-CCNC: 65 UNIT/L (ref 40–150)
ALT SERPL-CCNC: 9 UNIT/L (ref 0–55)
AMPHET UR QL SCN: NEGATIVE
APPEARANCE UR: CLEAR
AST SERPL-CCNC: 14 UNIT/L (ref 5–34)
B-HCG UR QL: NEGATIVE
BACTERIA #/AREA URNS AUTO: ABNORMAL /HPF
BARBITURATE SCN PRESENT UR: NEGATIVE
BASOPHILS # BLD AUTO: 0.03 X10(3)/MCL
BASOPHILS NFR BLD AUTO: 0.5 %
BENZODIAZ UR QL SCN: NEGATIVE
BILIRUB SERPL-MCNC: 0.8 MG/DL
BILIRUB UR QL STRIP.AUTO: NEGATIVE
BUN SERPL-MCNC: 8.5 MG/DL (ref 7–18.7)
CALCIUM SERPL-MCNC: 9.3 MG/DL (ref 8.4–10.2)
CANNABINOIDS UR QL SCN: POSITIVE
CHLORIDE SERPL-SCNC: 106 MMOL/L (ref 98–107)
CO2 SERPL-SCNC: 23 MMOL/L (ref 22–29)
COCAINE UR QL SCN: NEGATIVE
COLOR UR AUTO: COLORLESS
CREAT SERPL-MCNC: 0.64 MG/DL (ref 0.55–1.02)
CRP SERPL-MCNC: 1.9 MG/L
CTP QC/QA: YES
D DIMER PPP IA.FEU-MCNC: 0.37 UG/ML FEU (ref 0–0.5)
EOSINOPHIL # BLD AUTO: 0.03 X10(3)/MCL (ref 0–0.9)
EOSINOPHIL NFR BLD AUTO: 0.5 %
ERYTHROCYTE [DISTWIDTH] IN BLOOD BY AUTOMATED COUNT: 12.8 % (ref 11.5–17)
ERYTHROCYTE [SEDIMENTATION RATE] IN BLOOD: <1 MM/HR (ref 0–20)
EST. AVERAGE GLUCOSE BLD GHB EST-MCNC: 88.2 MG/DL
FENTANYL UR QL SCN: NEGATIVE
FERRITIN SERPL-MCNC: 80.25 NG/ML (ref 4.63–204)
FLUAV AG UPPER RESP QL IA.RAPID: NOT DETECTED
FLUBV AG UPPER RESP QL IA.RAPID: NOT DETECTED
GFR SERPLBLD CREATININE-BSD FMLA CKD-EPI: >60 MLS/MIN/1.73/M2
GLOBULIN SER-MCNC: 3.1 GM/DL (ref 2.4–3.5)
GLUCOSE SERPL-MCNC: 110 MG/DL (ref 74–100)
GLUCOSE UR QL STRIP.AUTO: NORMAL
HBA1C MFR BLD: 4.7 %
HCT VFR BLD AUTO: 39.6 % (ref 37–47)
HGB BLD-MCNC: 13.2 G/DL (ref 12–16)
HOLD SPECIMEN: NORMAL
HOLD SPECIMEN: NORMAL
HYALINE CASTS #/AREA URNS LPF: ABNORMAL /LPF
IMM GRANULOCYTES # BLD AUTO: 0.02 X10(3)/MCL (ref 0–0.04)
IMM GRANULOCYTES NFR BLD AUTO: 0.3 %
KETONES UR QL STRIP.AUTO: NEGATIVE
LACTATE SERPL-SCNC: 1.1 MMOL/L (ref 0.5–2.2)
LEUKOCYTE ESTERASE UR QL STRIP.AUTO: 75
LIPASE SERPL-CCNC: 19 U/L
LYMPHOCYTES # BLD AUTO: 0.35 X10(3)/MCL (ref 0.6–4.6)
LYMPHOCYTES NFR BLD AUTO: 5.7 %
MAGNESIUM SERPL-MCNC: 1.5 MG/DL (ref 1.6–2.6)
MCH RBC QN AUTO: 29.5 PG (ref 27–31)
MCHC RBC AUTO-ENTMCNC: 33.3 G/DL (ref 33–36)
MCV RBC AUTO: 88.4 FL (ref 80–94)
MDMA UR QL SCN: NEGATIVE
MONOCYTES # BLD AUTO: 0.4 X10(3)/MCL (ref 0.1–1.3)
MONOCYTES NFR BLD AUTO: 6.5 %
MRSA PCR SCRN (OHS): NOT DETECTED
NEUTROPHILS # BLD AUTO: 5.35 X10(3)/MCL (ref 2.1–9.2)
NEUTROPHILS NFR BLD AUTO: 86.5 %
NITRITE UR QL STRIP.AUTO: NEGATIVE
NRBC BLD AUTO-RTO: 0 %
OPIATES UR QL SCN: NEGATIVE
PCP UR QL: NEGATIVE
PH UR STRIP.AUTO: 6.5 [PH]
PH UR: 6.5 [PH] (ref 3–11)
PHOSPHATE SERPL-MCNC: 1.9 MG/DL (ref 2.3–4.7)
PLATELET # BLD AUTO: 146 X10(3)/MCL (ref 130–400)
PMV BLD AUTO: 10.9 FL (ref 7.4–10.4)
POTASSIUM SERPL-SCNC: 3.7 MMOL/L (ref 3.5–5.1)
PROT SERPL-MCNC: 7.5 GM/DL (ref 6.4–8.3)
PROT UR QL STRIP.AUTO: NEGATIVE
RBC # BLD AUTO: 4.48 X10(6)/MCL (ref 4.2–5.4)
RBC #/AREA URNS AUTO: ABNORMAL /HPF
RBC UR QL AUTO: NEGATIVE
SARS-COV-2 RNA RESP QL NAA+PROBE: DETECTED
SODIUM SERPL-SCNC: 138 MMOL/L (ref 136–145)
SP GR UR STRIP.AUTO: 1 (ref 1–1.03)
SPECIFIC GRAVITY, URINE AUTO (.000) (OHS): 1 (ref 1–1.03)
SQUAMOUS #/AREA URNS LPF: ABNORMAL /HPF
STREP A PCR (OHS): NOT DETECTED
TSH SERPL-ACNC: 0.89 UIU/ML (ref 0.35–4.94)
UROBILINOGEN UR STRIP-ACNC: NORMAL
WBC # SPEC AUTO: 6.18 X10(3)/MCL (ref 4.5–11.5)
WBC #/AREA URNS AUTO: ABNORMAL /HPF

## 2024-01-08 PROCEDURE — 84443 ASSAY THYROID STIM HORMONE: CPT | Performed by: PHYSICIAN ASSISTANT

## 2024-01-08 PROCEDURE — G0378 HOSPITAL OBSERVATION PER HR: HCPCS

## 2024-01-08 PROCEDURE — 87651 STREP A DNA AMP PROBE: CPT

## 2024-01-08 PROCEDURE — 96366 THER/PROPH/DIAG IV INF ADDON: CPT

## 2024-01-08 PROCEDURE — 25000003 PHARM REV CODE 250

## 2024-01-08 PROCEDURE — 83605 ASSAY OF LACTIC ACID: CPT | Performed by: STUDENT IN AN ORGANIZED HEALTH CARE EDUCATION/TRAINING PROGRAM

## 2024-01-08 PROCEDURE — 63600175 PHARM REV CODE 636 W HCPCS: Performed by: PHYSICIAN ASSISTANT

## 2024-01-08 PROCEDURE — 96367 TX/PROPH/DG ADDL SEQ IV INF: CPT

## 2024-01-08 PROCEDURE — 81025 URINE PREGNANCY TEST: CPT | Performed by: PHYSICIAN ASSISTANT

## 2024-01-08 PROCEDURE — 83735 ASSAY OF MAGNESIUM: CPT

## 2024-01-08 PROCEDURE — 85652 RBC SED RATE AUTOMATED: CPT

## 2024-01-08 PROCEDURE — 96375 TX/PRO/DX INJ NEW DRUG ADDON: CPT

## 2024-01-08 PROCEDURE — 25000003 PHARM REV CODE 250: Performed by: STUDENT IN AN ORGANIZED HEALTH CARE EDUCATION/TRAINING PROGRAM

## 2024-01-08 PROCEDURE — 96365 THER/PROPH/DIAG IV INF INIT: CPT

## 2024-01-08 PROCEDURE — 87641 MR-STAPH DNA AMP PROBE: CPT

## 2024-01-08 PROCEDURE — 86140 C-REACTIVE PROTEIN: CPT

## 2024-01-08 PROCEDURE — 84100 ASSAY OF PHOSPHORUS: CPT

## 2024-01-08 PROCEDURE — 83690 ASSAY OF LIPASE: CPT | Performed by: PHYSICIAN ASSISTANT

## 2024-01-08 PROCEDURE — 63600175 PHARM REV CODE 636 W HCPCS

## 2024-01-08 PROCEDURE — 96361 HYDRATE IV INFUSION ADD-ON: CPT

## 2024-01-08 PROCEDURE — 96372 THER/PROPH/DIAG INJ SC/IM: CPT

## 2024-01-08 PROCEDURE — 93005 ELECTROCARDIOGRAM TRACING: CPT

## 2024-01-08 PROCEDURE — 85379 FIBRIN DEGRADATION QUANT: CPT | Performed by: PHYSICIAN ASSISTANT

## 2024-01-08 PROCEDURE — 86060 ANTISTREPTOLYSIN O TITER: CPT

## 2024-01-08 PROCEDURE — 80307 DRUG TEST PRSMV CHEM ANLYZR: CPT | Performed by: PHYSICIAN ASSISTANT

## 2024-01-08 PROCEDURE — 81001 URINALYSIS AUTO W/SCOPE: CPT | Performed by: PHYSICIAN ASSISTANT

## 2024-01-08 PROCEDURE — 85025 COMPLETE CBC W/AUTO DIFF WBC: CPT | Performed by: PHYSICIAN ASSISTANT

## 2024-01-08 PROCEDURE — 80053 COMPREHEN METABOLIC PANEL: CPT | Performed by: PHYSICIAN ASSISTANT

## 2024-01-08 PROCEDURE — 87040 BLOOD CULTURE FOR BACTERIA: CPT | Mod: 91

## 2024-01-08 PROCEDURE — 82728 ASSAY OF FERRITIN: CPT

## 2024-01-08 PROCEDURE — 99285 EMERGENCY DEPT VISIT HI MDM: CPT | Mod: 25

## 2024-01-08 PROCEDURE — 25000003 PHARM REV CODE 250: Performed by: PHYSICIAN ASSISTANT

## 2024-01-08 PROCEDURE — 83036 HEMOGLOBIN GLYCOSYLATED A1C: CPT

## 2024-01-08 PROCEDURE — 0240U COVID/FLU A&B PCR: CPT | Performed by: PHYSICIAN ASSISTANT

## 2024-01-08 RX ORDER — MAGNESIUM SULFATE HEPTAHYDRATE 40 MG/ML
4 INJECTION, SOLUTION INTRAVENOUS ONCE
Status: COMPLETED | OUTPATIENT
Start: 2024-01-08 | End: 2024-01-08

## 2024-01-08 RX ORDER — IBUPROFEN 200 MG
24 TABLET ORAL
Status: DISCONTINUED | OUTPATIENT
Start: 2024-01-08 | End: 2024-01-10 | Stop reason: HOSPADM

## 2024-01-08 RX ORDER — DOXYCYCLINE HYCLATE 100 MG
100 TABLET ORAL EVERY 12 HOURS
Status: DISCONTINUED | OUTPATIENT
Start: 2024-01-08 | End: 2024-01-10 | Stop reason: HOSPADM

## 2024-01-08 RX ORDER — METOPROLOL TARTRATE 1 MG/ML
5 INJECTION, SOLUTION INTRAVENOUS EVERY 5 MIN PRN
Status: DISCONTINUED | OUTPATIENT
Start: 2024-01-08 | End: 2024-01-08

## 2024-01-08 RX ORDER — TALC
6 POWDER (GRAM) TOPICAL NIGHTLY PRN
Status: DISCONTINUED | OUTPATIENT
Start: 2024-01-08 | End: 2024-01-10 | Stop reason: HOSPADM

## 2024-01-08 RX ORDER — ACETAMINOPHEN 325 MG/1
650 TABLET ORAL EVERY 4 HOURS PRN
Status: DISCONTINUED | OUTPATIENT
Start: 2024-01-08 | End: 2024-01-09

## 2024-01-08 RX ORDER — ENOXAPARIN SODIUM 100 MG/ML
40 INJECTION SUBCUTANEOUS EVERY 24 HOURS
Status: DISCONTINUED | OUTPATIENT
Start: 2024-01-08 | End: 2024-01-10 | Stop reason: HOSPADM

## 2024-01-08 RX ORDER — SODIUM CHLORIDE, SODIUM LACTATE, POTASSIUM CHLORIDE, CALCIUM CHLORIDE 600; 310; 30; 20 MG/100ML; MG/100ML; MG/100ML; MG/100ML
INJECTION, SOLUTION INTRAVENOUS CONTINUOUS
Status: DISCONTINUED | OUTPATIENT
Start: 2024-01-08 | End: 2024-01-10 | Stop reason: HOSPADM

## 2024-01-08 RX ORDER — IPRATROPIUM BROMIDE AND ALBUTEROL SULFATE 2.5; .5 MG/3ML; MG/3ML
3 SOLUTION RESPIRATORY (INHALATION) EVERY 6 HOURS PRN
Status: DISCONTINUED | OUTPATIENT
Start: 2024-01-08 | End: 2024-01-10 | Stop reason: HOSPADM

## 2024-01-08 RX ORDER — GLUCAGON 1 MG
1 KIT INJECTION
Status: DISCONTINUED | OUTPATIENT
Start: 2024-01-08 | End: 2024-01-10 | Stop reason: HOSPADM

## 2024-01-08 RX ORDER — PROMETHAZINE HYDROCHLORIDE 25 MG/1
25 TABLET ORAL EVERY 6 HOURS PRN
Status: DISCONTINUED | OUTPATIENT
Start: 2024-01-08 | End: 2024-01-10 | Stop reason: HOSPADM

## 2024-01-08 RX ORDER — IBUPROFEN 200 MG
16 TABLET ORAL
Status: DISCONTINUED | OUTPATIENT
Start: 2024-01-08 | End: 2024-01-10 | Stop reason: HOSPADM

## 2024-01-08 RX ORDER — ACETAMINOPHEN 500 MG
1000 TABLET ORAL
Status: COMPLETED | OUTPATIENT
Start: 2024-01-08 | End: 2024-01-08

## 2024-01-08 RX ORDER — METOPROLOL TARTRATE 25 MG/1
12.5 TABLET ORAL
Status: COMPLETED | OUTPATIENT
Start: 2024-01-08 | End: 2024-01-08

## 2024-01-08 RX ORDER — POLYETHYLENE GLYCOL 3350 17 G/17G
17 POWDER, FOR SOLUTION ORAL DAILY
Status: DISCONTINUED | OUTPATIENT
Start: 2024-01-09 | End: 2024-01-09

## 2024-01-08 RX ORDER — ONDANSETRON 4 MG/1
8 TABLET, ORALLY DISINTEGRATING ORAL EVERY 8 HOURS PRN
Status: DISCONTINUED | OUTPATIENT
Start: 2024-01-08 | End: 2024-01-09

## 2024-01-08 RX ORDER — SODIUM CHLORIDE 9 MG/ML
1000 INJECTION, SOLUTION INTRAVENOUS
Status: COMPLETED | OUTPATIENT
Start: 2024-01-08 | End: 2024-01-08

## 2024-01-08 RX ORDER — ONDANSETRON 2 MG/ML
4 INJECTION INTRAMUSCULAR; INTRAVENOUS
Status: COMPLETED | OUTPATIENT
Start: 2024-01-08 | End: 2024-01-08

## 2024-01-08 RX ORDER — MAG HYDROX/ALUMINUM HYD/SIMETH 200-200-20
30 SUSPENSION, ORAL (FINAL DOSE FORM) ORAL 4 TIMES DAILY PRN
Status: DISCONTINUED | OUTPATIENT
Start: 2024-01-08 | End: 2024-01-10 | Stop reason: HOSPADM

## 2024-01-08 RX ORDER — NALOXONE HCL 0.4 MG/ML
0.02 VIAL (ML) INJECTION
Status: DISCONTINUED | OUTPATIENT
Start: 2024-01-08 | End: 2024-01-10 | Stop reason: HOSPADM

## 2024-01-08 RX ORDER — SODIUM CHLORIDE 0.9 % (FLUSH) 0.9 %
10 SYRINGE (ML) INJECTION EVERY 12 HOURS PRN
Status: DISCONTINUED | OUTPATIENT
Start: 2024-01-08 | End: 2024-01-10 | Stop reason: HOSPADM

## 2024-01-08 RX ADMIN — ONDANSETRON 4 MG: 2 INJECTION INTRAMUSCULAR; INTRAVENOUS at 10:01

## 2024-01-08 RX ADMIN — SODIUM CHLORIDE 1000 ML: 9 INJECTION, SOLUTION INTRAVENOUS at 01:01

## 2024-01-08 RX ADMIN — SODIUM CHLORIDE, POTASSIUM CHLORIDE, SODIUM LACTATE AND CALCIUM CHLORIDE 500 ML: 600; 310; 30; 20 INJECTION, SOLUTION INTRAVENOUS at 05:01

## 2024-01-08 RX ADMIN — MAGNESIUM SULFATE HEPTAHYDRATE 4 G: 40 INJECTION, SOLUTION INTRAVENOUS at 05:01

## 2024-01-08 RX ADMIN — POTASSIUM PHOSPHATE, MONOBASIC POTASSIUM PHOSPHATE, DIBASIC 15 MMOL: 224; 236 INJECTION, SOLUTION, CONCENTRATE INTRAVENOUS at 06:01

## 2024-01-08 RX ADMIN — ACETAMINOPHEN 1000 MG: 500 TABLET ORAL at 12:01

## 2024-01-08 RX ADMIN — SODIUM CHLORIDE, POTASSIUM CHLORIDE, SODIUM LACTATE AND CALCIUM CHLORIDE: 600; 310; 30; 20 INJECTION, SOLUTION INTRAVENOUS at 06:01

## 2024-01-08 RX ADMIN — ENOXAPARIN SODIUM 40 MG: 100 INJECTION SUBCUTANEOUS at 05:01

## 2024-01-08 RX ADMIN — SODIUM CHLORIDE, POTASSIUM CHLORIDE, SODIUM LACTATE AND CALCIUM CHLORIDE: 600; 310; 30; 20 INJECTION, SOLUTION INTRAVENOUS at 11:01

## 2024-01-08 RX ADMIN — SODIUM CHLORIDE 1000 ML: 9 INJECTION, SOLUTION INTRAVENOUS at 11:01

## 2024-01-08 RX ADMIN — DOXYCYCLINE HYCLATE 100 MG: 100 TABLET, FILM COATED ORAL at 08:01

## 2024-01-08 RX ADMIN — ACETAMINOPHEN 650 MG: 325 TABLET, FILM COATED ORAL at 05:01

## 2024-01-08 RX ADMIN — SODIUM CHLORIDE 1000 ML: 9 INJECTION, SOLUTION INTRAVENOUS at 10:01

## 2024-01-08 RX ADMIN — ACETAMINOPHEN 650 MG: 325 TABLET, FILM COATED ORAL at 11:01

## 2024-01-08 RX ADMIN — METOPROLOL TARTRATE 12.5 MG: 25 TABLET, FILM COATED ORAL at 01:01

## 2024-01-08 NOTE — ED PROVIDER NOTES
Encounter Date: 1/8/2024     History     Chief Complaint   Patient presents with    Abdominal Pain     Abd pain, n/d, tachycardia and chest pain      Patient with pmhx of anxiety presents today c/o palpitations, chest pain, and mild shortness of breath that started this morning. She feels like her heart is racing. Patient also says for the last 7 days she has had intermittent abdominal pain, nausea, and diarrhea. She is not currently having abdominal pain, but is very nauseous. Of note, patient reports hx of tachycardia and says she was supposed to be referred to a cardiologist, but she never received an appointment.     The history is provided by the patient. No  was used.     Review of patient's allergies indicates:   Allergen Reactions    House dust Hives, Itching, Nausea And Vomiting and Shortness Of Breath     Past Medical History:   Diagnosis Date    Anxiety disorder, unspecified     Irregular uterine contractions      Past Surgical History:   Procedure Laterality Date    TONSILECTOMY      TYMPANOSTOMY TUBE PLACEMENT       Family History   Problem Relation Age of Onset    Lung cancer Maternal Aunt     Uterine cancer Maternal Aunt     Breast cancer Neg Hx     Colon cancer Neg Hx     Ovarian cancer Neg Hx     Cervical cancer Neg Hx      Social History     Tobacco Use    Smoking status: Never     Passive exposure: Never    Smokeless tobacco: Never   Substance Use Topics    Alcohol use: Not Currently    Drug use: Never     Comment: CBD use     Review of Systems   Constitutional:  Negative for chills and fever.   Respiratory:  Positive for shortness of breath. Negative for apnea, cough, choking, chest tightness, wheezing and stridor.    Cardiovascular:  Positive for chest pain and palpitations. Negative for leg swelling.   Gastrointestinal:  Positive for abdominal pain, diarrhea and nausea. Negative for vomiting.   Genitourinary:  Negative for dysuria, flank pain and hematuria.    Musculoskeletal:  Negative for arthralgias and myalgias.   Skin:  Negative for rash.   Neurological:  Negative for syncope, light-headedness and headaches.   All other systems reviewed and are negative.      Physical Exam     Initial Vitals   BP Pulse Resp Temp SpO2   01/08/24 0858 01/08/24 0858 01/08/24 0857 01/08/24 0857 01/08/24 0858   115/73 (!) 142 19 98.2 °F (36.8 °C) 99 %      MAP       --                Physical Exam    Nursing note and vitals reviewed.  Constitutional: She appears well-developed and well-nourished. She is not diaphoretic.   HENT:   Head: Normocephalic and atraumatic.   Mouth/Throat: Oropharynx is clear and moist. No oropharyngeal exudate.   Eyes: Conjunctivae and EOM are normal. Pupils are equal, round, and reactive to light.   Neck: Neck supple.   Normal range of motion.  Cardiovascular:  Regular rhythm, normal heart sounds and intact distal pulses.           No murmur heard.  Tachycardic   Pulmonary/Chest: Breath sounds normal. No respiratory distress. She has no wheezes. She has no rhonchi. She has no rales.   Abdominal: Abdomen is soft. Bowel sounds are normal. She exhibits no distension. There is no abdominal tenderness. There is no rebound and no guarding.   Musculoskeletal:         General: No edema.      Cervical back: Normal range of motion and neck supple.     Neurological: She is alert and oriented to person, place, and time. GCS score is 15. GCS eye subscore is 4. GCS verbal subscore is 5. GCS motor subscore is 6.   Skin: Skin is warm and dry. Capillary refill takes less than 2 seconds. No rash noted.   Psychiatric: She has a normal mood and affect.       ED Course   Procedures  Labs Reviewed   URINALYSIS, REFLEX TO URINE CULTURE - Abnormal; Notable for the following components:       Result Value    Color, UA Colorless (*)     Leukocyte Esterase, UA 75 (*)     Squamous Epithelial Cells, UA Trace (*)     All other components within normal limits   COMPREHENSIVE METABOLIC PANEL  - Abnormal; Notable for the following components:    Glucose Level 110 (*)     All other components within normal limits   CBC WITH DIFFERENTIAL - Abnormal; Notable for the following components:    MPV 10.9 (*)     Lymph # 0.35 (*)     All other components within normal limits   DRUG SCREEN, URINE (BEAKER) - Abnormal; Notable for the following components:    Cannabinoids, Urine Positive (*)     All other components within normal limits    Narrative:     Cut off concentrations:    Amphetamines - 1000 ng/ml  Barbiturates - 200 ng/ml  Benzodiazepine - 200 ng/ml  Cannabinoids (THC) - 50 ng/ml  Cocaine - 300 ng/ml  Fentanyl - 1.0 ng/ml  MDMA - 500 ng/ml  Opiates - 300 ng/ml   Phencyclidine (PCP) - 25 ng/ml    Specimen submitted for drug analysis and tested for pH and specific gravity in order to evaluate sample integrity. Suspect tampering if specific gravity is <1.003 and/or pH is not within the range of 4.5 - 8.0  False negatives may result form substances such as bleach added to urine.  False positives may result for the presence of a substance with similar chemical structure to the drug or its metabolite.    This test provides only a PRELIMINARY analytical test result. A more specific alternate chemical method must be used in order to obtain a confirmed analytical result. Gas chromatography/mass spectrometry (GC/MS) is the preferred confirmatory method. Other chemical confirmation methods are available. Clinical consideration and professional judgement should be applied to any drug of abuse test result, particularly when preliminary positive results are used.    Positive results will be confirmed only at the physicians request. Unconfirmed screening results are to be used only for medical purposes (treatment).        COVID/FLU A&B PCR - Abnormal; Notable for the following components:    SARS-CoV-2 PCR Detected (*)     All other components within normal limits    Narrative:     The Xpert Xpress SARS-CoV-2/FLU/RSV plus  is a rapid, multiplexed real-time PCR test intended for the simultaneous qualitative detection and differentiation of SARS-CoV-2, Influenza A, Influenza B, and respiratory syncytial virus (RSV) viral RNA in either nasopharyngeal swab or nasal swab specimens.         LIPASE - Normal   TSH - Normal   D DIMER, QUANTITATIVE - Normal   LACTIC ACID, PLASMA - Normal   CBC W/ AUTO DIFFERENTIAL    Narrative:     The following orders were created for panel order CBC auto differential.  Procedure                               Abnormality         Status                     ---------                               -----------         ------                     CBC with Differential[[id number]]       Abnormal            Final result                 Please view results for these tests on the individual orders.   EXTRA TUBES    Narrative:     The following orders were created for panel order EXTRA TUBES.  Procedure                               Abnormality         Status                     ---------                               -----------         ------                     Pink Top Hold[[id number]]                                   Final result                 Please view results for these tests on the individual orders.   PINK TOP HOLD   POCT URINE PREGNANCY     EKG Readings: (Independently Interpreted)   Initial Reading: No STEMI. Rhythm: Sinus Tachycardia. Heart Rate: 144. Ectopy: No Ectopy. Conduction: Normal. Axis: Normal.     ECG Results              EKG 12-lead (Chest Pain) Age >30 (In process)  Result time 01/08/24 09:08:39      In process by Interface, Lab In [facility] (01/08/24 09:08:39)                   Narrative:    Test Reason : R07.9,    Vent. Rate : 144 BPM     Atrial Rate : 144 BPM     P-R Int : 118 ms          QRS Dur : 066 ms      QT Int : 276 ms       P-R-T Axes : 070 071 030 degrees     QTc Int : 427 ms    Sinus tachycardia  Nonspecific ST abnormality  Abnormal ECG  When compared with ECG of 13-DEC-2023  02:59,  T wave inversion less evident in Inferior leads    Referred By:             Confirmed By:                                      EKG 12-lead (In process)  Result time 01/08/24 11:56:23      In process by Interface, Lab In [facility] (01/08/24 11:56:23)                   Narrative:    Test Reason : R07.9,    Vent. Rate : 144 BPM     Atrial Rate : 144 BPM     P-R Int : 118 ms          QRS Dur : 066 ms      QT Int : 276 ms       P-R-T Axes : 070 071 030 degrees     QTc Int : 427 ms    Sinus tachycardia  Nonspecific ST abnormality  Abnormal ECG  When compared with ECG of 13-DEC-2023 02:59,  T wave inversion less evident in Inferior leads    Referred By: [email or web address]   SELF           Confirmed By:                                   Imaging Results              X-Ray Abdomen Flat And Erect (Final result)  Result time 01/08/24 11:13:39      Final result by Randolph Gale MD (01/08/24 11:13:39)                   Impression:      No acute findings.      Electronically signed by: Randolph Gale  Date:    01/08/2024  Time:    11:13               Narrative:    EXAMINATION:  XR ABDOMEN FLAT AND ERECT    CLINICAL HISTORY:  Unspecified abdominal pain    COMPARISON:  None    FINDINGS:  Flat and upright views of the abdomen demonstrate a nonspecific, nonobstructive bowel gas pattern.  No large stool burden.  No free air.                                       X-Ray Chest 1 View (Final result)  Result time 01/08/24 10:49:49      Final result by Reji Lake MD (01/08/24 10:49:49)                   Impression:      No acute chest disease is identified.      Electronically signed by: Reji Lake  Date:    01/08/2024  Time:    10:49               Narrative:    EXAMINATION:  XR CHEST 1 VIEW    CLINICAL HISTORY:  tachycardia;, .    COMPARISON:  June 19, 2023    FINDINGS:  No alveolar consolidation, effusion, or pneumothorax is seen.   The thoracic aorta is normal  cardiac silhouette, central pulmonary vessels and mediastinum  are normal in size and are grossly unremarkable.   visualized osseous structures are grossly unremarkable.                                       Medications   0.9%  NaCl infusion (0 mLs Intravenous Stopped 1/8/24 1123)   ondansetron injection 4 mg (4 mg Intravenous Given 1/8/24 1036)   sodium chloride 0.9% bolus 1,000 mL 1,000 mL (0 mLs Intravenous Stopped 1/8/24 1235)   acetaminophen tablet 1,000 mg (1,000 mg Oral Given 1/8/24 1227)   metoprolol tartrate (LOPRESSOR) split tablet 12.5 mg (12.5 mg Oral Given 1/8/24 1310)   0.9%  NaCl infusion (1,000 mLs Intravenous New Bag 1/8/24 1351)     Medical Decision Making  Patient presents today with tachycardia, chest pain, and sob.    Ddx: pulmonary embolism, anxiety, hyperthyroidism, SVT, sinus tachycardia, sepsis amongst others     Patient is non-toxic appearing. EKG consistent with sinus tachycardia. She is covid positive, but otherwise labs are within normal limits. Case discussed with cardiology who recommended initiating metoprolol 12.5mg. Patient was given dose in ED and heart rate improved from 150s to 110-120s. She has been hypotensive on multiple blood pressure checks. Patient currently reports feeling weak and dizzy., Medicine has been consulted for admission for further evaluation of tachyarrhythmia. Patient agrees with plan.     Amount and/or Complexity of Data Reviewed  External Data Reviewed: notes.  Labs: ordered. Decision-making details documented in ED Course.  Radiology: ordered. Decision-making details documented in ED Course.  ECG/medicine tests: ordered. Decision-making details documented in ED Course.    Risk  OTC drugs.  Prescription drug management.               ED Course as of 01/08/24 1534   Mon Jan 08, 2024   1113 WBC: 6.18 [SA]   1113 Hemoglobin: 13.2 [SA]   1113 Hematocrit: 39.6 [SA]   1113 Creatinine: 0.64 [SA]   1113 BUN: 8.5 [SA]   1113 Lipase: 19 [SA]   1113 D-Dimer: 0.37 [SA]   1113 TSH: 0.892 [SA]   1113 Preg Test, Ur: Negative [SA]    1113 Cannabinoids, Urine(!): Positive [SA]   1114 X-Ray Chest 1 View [SA]   1117 X-Ray Abdomen Flat And Erect [SA]   1332 SARS-CoV2 (COVID-19) Qualitative PCR(!): Detected [SA]      ED Course User Index  [SA] Meg Hidalog PA                             Clinical Impression:  Final diagnoses:  [U07.1] COVID-19 (Primary)  [R00.0] Sinus tachycardia        ED Disposition Condition    Observation Stable                Meg Hidalgo PA  01/08/24 [id number]

## 2024-01-08 NOTE — H&P
[facility] Medicine Wards   History & Physical Note     Resident Team: [facility] Medicine List 4  Attending Physician: Barbie Morin MD  Resident: Yamilex Franco MD   Intern: Tejas Gaston DO    Date of Admit: 2024    Chief Complaint:     Abdominal Pain (Abd pain, n/d, tachycardia and chest pain )   for 1 day    Subjective:      History of Present Illness:  Robyn Guerrero is a 23 y.o. female with a history of spontaneous , gestational diabetes during recent pregnancy approximately 10 months ago, who presented to [facility] ED on 2024  with complaint of chest pain and palpitations.  Patient states that around 730 this morning she began to experience chest pain, shortness on breath, diaphoresis.  Patient states that her symptoms are bad enough that she was considering calling EMS but instead called her  to return home from work.  Patient states that she had developed a sore throat overnight; of note, patient had a tonsillectomy around the age of 10.  Patient states that her palpitations have been ongoing since her last pregnancy around 10 months ago and that she was supposed to have a cardiology workup but this was never done; however, patient states that her symptoms a day are far worse than any that she is experienced in the past.  Patient states that over this period of time she has experienced occasional chest pain that requires her to elevate her legs upon the wall for relief.  She endorses occasional episodes of near syncope.  Additionally patient states that she had a abscess on the inside of her left thigh that she expressed green purulent material from 1 day prior to arrival.    In the ED, patient is in sinus tach, ventricular rate 140.  Metoprolol given with little effect on heart rate however patient's blood pressure began to fall at 94/54.  Patient bolus 2 L normal saline with persistent tachycardia.  COVID positive.  CBC unremarkable.  D-dimer negative CMP notable for low magnesium and  phosphorus.  Lactate within normal limits A1c 4.7.  TSH within normal limits pregnancy test negative.  UA unremarkable.  Chest and abdominal x-rays both negative for any acute findings.  Internal Medicine has been consulted for tachycardia in the setting of COVID infection.      Past Medical History:  Past Medical History:   Diagnosis Date    Anxiety disorder, unspecified     Irregular uterine contractions         Past Surgical History:  Past Surgical History:   Procedure Laterality Date    TONSILECTOMY      TYMPANOSTOMY TUBE PLACEMENT          Family History:  Family History   Problem Relation Age of Onset    Lung cancer Maternal Aunt     Uterine cancer Maternal Aunt     Breast cancer Neg Hx     Colon cancer Neg Hx     Ovarian cancer Neg Hx     Cervical cancer Neg Hx         Social History:  Social History     Socioeconomic History    Marital status: Significant Other   Tobacco Use    Smoking status: Never     Passive exposure: Never    Smokeless tobacco: Never   Substance and Sexual Activity    Alcohol use: Not Currently    Drug use: Never     Comment: CBD use    Sexual activity: Not Currently     Partners: Male     Birth control/protection: None     Social Determinants of Health     Financial Resource Strain: Low Risk  (3/7/2023)    Overall Financial Resource Strain (CARDIA)     Difficulty of Paying Living Expenses: Not hard at all   Food Insecurity: No Food Insecurity (3/7/2023)    Hunger Vital Sign     Worried About Running Out of Food in the Last Year: Never true     Ran Out of Food in the Last Year: Never true   Transportation Needs: No Transportation Needs (3/7/2023)    PRAPARE - Transportation     Lack of Transportation (Medical): No     Lack of Transportation (Non-Medical): No   Physical Activity: Sufficiently Active (3/7/2023)    Exercise Vital Sign     Days of Exercise per Week: 3 days     Minutes of Exercise per Session: 60 min   Stress: No Stress Concern Present (3/7/2023)    [facility]  [facility]upational [facility] - Occupational Stress Questionnaire     Feeling of Stress : Not at all   Social Connections: Moderately Integrated (3/7/2023)    Social Connection and Isolation Panel [NHANES]     Frequency of Communication with Friends and Family: More than three times a week     Frequency of Social Gatherings with Friends and Family: More than three times a week     Attends Congregation Services: 1 to 4 times per year     Active Member of Clubs or Organizations: No     Attends Club or Organization Meetings: Never     Marital Status: Living with partner   Housing Stability: Low Risk  (3/7/2023)    Housing Stability Vital Sign     Unable to Pay for Housing in the Last Year: No     Number of Places Lived in the Last Year: 1     Unstable Housing in the Last Year: No        Allergies:  is allergic to house dust.     Home Medications:  Prior to Admission medications    Medication Sig Start Date End Date Taking? Authorizing Provider   dicyclomine (BENTYL) 10 MG capsule Take 1 capsule (10 mg total) by mouth every 6 (six) hours as needed (for cramping). 12/13/23 1/12/24  Payam Lentz MD   ondansetron (ZOFRAN-ODT) 4 MG TbDL Take 1 tablet (4 mg total) by mouth 1 (one) time if needed (as needed for nause and vomiting). 12/13/23   Payam Lentz MD   prenatal vit no.124/iron/folic (PRENATAL VITAMIN ORAL) Take 1 tablet by mouth once daily.    Provider, Historical       Review of Systems:  Review of Systems   Constitutional:  Positive for diaphoresis. Negative for chills and fever.   HENT:  Positive for sore throat. Negative for congestion.    Respiratory:  Positive for shortness of breath. Negative for cough, sputum production and wheezing.    Cardiovascular:  Positive for chest pain and palpitations. Negative for leg swelling.   Gastrointestinal:  Negative for abdominal pain, diarrhea and vomiting.   Genitourinary: Negative.    Musculoskeletal: Negative.    Neurological:  Positive for dizziness. Negative for focal weakness and  loss of consciousness.   Endo/Heme/Allergies: Negative.    Psychiatric/Behavioral: Negative.            Objective:     Vital Signs (Most Recent):  Temp: 98.2 °F (36.8 °C) (01/08/24 0857)  Pulse: 101 (01/08/24 1537)  Resp: 15 (01/08/24 1532)  BP: (!) 103/57 (01/08/24 1532)  SpO2: 100 % (01/08/24 1532) Vital Signs (24h Range):  Temp:  [98.2 °F (36.8 °C)] 98.2 °F (36.8 °C)  Pulse:  [101-158] 101  Resp:  [11-22] 15  SpO2:  [99 %-100 %] 100 %  BP: ()/(53-84) 103/57       Physical Examination:  Physical Exam  Vitals reviewed.   Constitutional:       General: She is not in acute distress.     Appearance: Normal appearance. She is normal weight. She is not ill-appearing or diaphoretic.   HENT:      Head: Normocephalic and atraumatic.      Nose: Nose normal.      Mouth/Throat:      Mouth: Mucous membranes are moist.      Pharynx: Oropharynx is clear. No oropharyngeal exudate or posterior oropharyngeal erythema.   Eyes:      Extraocular Movements: Extraocular movements intact.      Conjunctiva/sclera: Conjunctivae normal.      Pupils: Pupils are equal, round, and reactive to light.   Cardiovascular:      Rate and Rhythm: Regular rhythm. Tachycardia present.      Pulses: Normal pulses.      Heart sounds: Normal heart sounds. No murmur heard.  Pulmonary:      Effort: Pulmonary effort is normal. No respiratory distress.      Breath sounds: Normal breath sounds. No stridor. No wheezing, rhonchi or rales.   Chest:      Chest wall: No tenderness.   Abdominal:      General: Abdomen is flat. Bowel sounds are normal.      Palpations: Abdomen is soft.      Tenderness: There is abdominal tenderness.   Musculoskeletal:         General: No swelling or tenderness.      Cervical back: Normal range of motion and neck supple.   Lymphadenopathy:      Cervical: Cervical adenopathy present.   Skin:     General: Skin is warm and dry.      Findings: Erythema and lesion (abscess to the Left inner thigh; indurated lesion superior to the  coccyx) present.   Neurological:      General: No focal deficit present.      Mental Status: She is alert and oriented to person, place, and time.   Psychiatric:         Mood and Affect: Mood normal.         Behavior: Behavior normal.         Thought Content: Thought content normal.         Judgment: Judgment normal.           CBC  Recent Labs   Lab 01/08/24  0941   WBC 6.18   RBC 4.48   HGB 13.2   HCT 39.6   MCV 88.4   MCH 29.5   MCHC 33.3   RDW 12.8      MPV 10.9*       CMP   Recent Labs   Lab 01/08/24  0941 01/08/24  1646     --    K 3.7  --    CHLORIDE 106  --    CO2 23  --    BUN 8.5  --    CREATININE 0.64  --    CALCIUM 9.3  --    MG  --  1.50*   ALBUMIN 4.4  --    ALKPHOS 65  --    ALT 9  --    AST 14  --    BILITOT 0.8  --           Microbiology Data:  Microbiology Results (last 7 days)       Procedure Component Value Units Date/Time    Blood Culture (site 1) [[id number]]     Order Status: Sent Specimen: Blood from Arm, Right     Blood Culture (site 2) [[id number]]     Order Status: Sent Specimen: Blood from Arm, Right     Rapid Influenza A/B [[id number]]     Order Status: Canceled Specimen: Nasopharyngeal     Influenza A & B by Molecular [[id number]]     Order Status: Canceled Specimen: Nasopharyngeal Swab             Cardiac Data:  No echocardiogram results found for the past 14 days.    Results for orders placed or performed during the hospital encounter of 01/08/24   EKG 12-lead    Collection Time: 01/08/24  9:04 AM    Narrative    Test Reason : R07.9,    Vent. Rate : 144 BPM     Atrial Rate : 144 BPM     P-R Int : 118 ms          QRS Dur : 066 ms      QT Int : 276 ms       P-R-T Axes : 070 071 030 degrees     QTc Int : 427 ms    Sinus tachycardia  Nonspecific ST abnormality  Abnormal ECG  When compared with ECG of 13-DEC-2023 02:59,  T wave inversion less evident in Inferior leads    Referred By: [email or web address]   SELF           Confirmed By:         Radiology:  Imaging Results              X-Ray  Abdomen Flat And Erect (Final result)  Result time 01/08/24 11:13:39      Final result by Randolph Gale MD (01/08/24 11:13:39)                   Impression:      No acute findings.      Electronically signed by: Randolph Gale  Date:    01/08/2024  Time:    11:13               Narrative:    EXAMINATION:  XR ABDOMEN FLAT AND ERECT    CLINICAL HISTORY:  Unspecified abdominal pain    COMPARISON:  None    FINDINGS:  Flat and upright views of the abdomen demonstrate a nonspecific, nonobstructive bowel gas pattern.  No large stool burden.  No free air.                                       X-Ray Chest 1 View (Final result)  Result time 01/08/24 10:49:49      Final result by Reji Lake MD (01/08/24 10:49:49)                   Impression:      No acute chest disease is identified.      Electronically signed by: Reji Lake  Date:    01/08/2024  Time:    10:49               Narrative:    EXAMINATION:  XR CHEST 1 VIEW    CLINICAL HISTORY:  tachycardia;, .    COMPARISON:  June 19, 2023    FINDINGS:  No alveolar consolidation, effusion, or pneumothorax is seen.   The thoracic aorta is normal  cardiac silhouette, central pulmonary vessels and mediastinum are normal in size and are grossly unremarkable.   visualized osseous structures are grossly unremarkable.                                         Lines/Drains/Airways       Peripheral Intravenous Line  Duration                  Peripheral IV - Single Lumen 01/08/24 1016 20 G Right Antecubital <1 day                     Assessment & Plan:     COVID-19 Infection   SIRS 1/4   - Patient presents with tachycardia, found incidentally to be COVID pos  - Starting remdesivir early protocol   - duonebs q6hr PRN   - F/u MRSA PCR   - BCX x2   - F/u ESR, CRP, Ferritin     Sore Throat   - Patient endorses sore throat for the past week  - Tonsillectomy at the age of 10 following multiple untreated strep infections  - F/u Strep PCR and Anti-ASO titers     Sinus  Tachycardia  Chest Pain  - Patient reports 10 month+ h/o palpitations, near syncope, and occasional CP relieved by leg elevation; childhood h/o MORALES   - initial troponin neg  - Admitted to tele   - F/u Echo in the am   - Consult cardiology in the am    Abscess of the Left Thigh   Pilonidal Cyst   - Patient endorses a recurrent abscess on her left thigh that she expressed purulent material from 1 day PTA; she also endorses a recurrent pilonidal cyst  - Starting doxy 100 mg BID x10 d  - Will refer to general surgery at discharge          CODE STATUS:  Full  Access:  PIV  Antibiotics:  Doxycycline - day 1  Diet:  Regular  DVT Prophylaxis:  LVX 40  GI Prophylaxis:  None  Fluids:   cc/hr     Dispo: 22 yo F admitted for tachycardia in the setting of active COVID-19 infection.     Tejas Gaston DO   [employer] Internal Medicine PGY-1

## 2024-01-09 LAB
ABS NEUT CALC (OHS): 1.09 X10(3)/MCL (ref 2.1–9.2)
ALBUMIN SERPL-MCNC: 3.3 G/DL (ref 3.5–5)
ALBUMIN/GLOB SERPL: 1.4 RATIO (ref 1.1–2)
ALP SERPL-CCNC: 44 UNIT/L (ref 40–150)
ALT SERPL-CCNC: 8 UNIT/L (ref 0–55)
APTT PPP: 40.1 SECONDS (ref 23.2–33.7)
AST SERPL-CCNC: 13 UNIT/L (ref 5–34)
AV INDEX (PROSTH): 0.73
AV MEAN GRADIENT: 7 MMHG
AV PEAK GRADIENT: 12 MMHG
AV VALVE AREA BY VELOCITY RATIO: 2.3 CM²
AV VALVE AREA: 2.18 CM²
AV VELOCITY RATIO: 0.77
BASOPHILS # BLD AUTO: 0.01 X10(3)/MCL
BASOPHILS NFR BLD AUTO: 0.5 %
BILIRUB SERPL-MCNC: 0.5 MG/DL
BSA FOR ECHO PROCEDURE: 1.51 M2
BUN SERPL-MCNC: 4.1 MG/DL (ref 7–18.7)
CALCIUM SERPL-MCNC: 8 MG/DL (ref 8.4–10.2)
CHLORIDE SERPL-SCNC: 111 MMOL/L (ref 98–107)
CO2 SERPL-SCNC: 22 MMOL/L (ref 22–29)
CREAT SERPL-MCNC: 0.56 MG/DL (ref 0.55–1.02)
CV ECHO LV RWT: 0.49 CM
DIRECT COOMBS (DAT): NORMAL
DOP CALC AO PEAK VEL: 1.7 M/S
DOP CALC AO VTI: 29.1 CM
DOP CALC LVOT AREA: 3 CM2
DOP CALC LVOT DIAMETER: 1.95 CM
DOP CALC LVOT PEAK VEL: 1.31 M/S
DOP CALC LVOT STROKE VOLUME: 63.58 CM3
DOP CALC MV VTI: 25.7 CM
DOP CALCLVOT PEAK VEL VTI: 21.3 CM
E WAVE DECELERATION TIME: 76.5 MSEC
E/A RATIO: 1.55
ECHO LV POSTERIOR WALL: 0.97 CM (ref 0.6–1.1)
ELLIPTOCYTOSIS (OHS): ABNORMAL
EOSINOPHIL # BLD AUTO: 0.01 X10(3)/MCL (ref 0–0.9)
EOSINOPHIL NFR BLD AUTO: 0.5 %
ERYTHROCYTE [DISTWIDTH] IN BLOOD BY AUTOMATED COUNT: 13 % (ref 11.5–17)
ERYTHROCYTE [DISTWIDTH] IN BLOOD BY AUTOMATED COUNT: 14.4 % (ref 11.5–17)
FOLATE SERPL-MCNC: 9.9 NG/ML (ref 7–31.4)
FRACTIONAL SHORTENING: 31 % (ref 28–44)
GFR SERPLBLD CREATININE-BSD FMLA CKD-EPI: >60 MLS/MIN/1.73/M2
GLOBULIN SER-MCNC: 2.4 GM/DL (ref 2.4–3.5)
GLUCOSE SERPL-MCNC: 100 MG/DL (ref 74–100)
HAV IGM SERPL QL IA: NONREACTIVE
HBV CORE IGM SERPL QL IA: NONREACTIVE
HBV SURFACE AG SERPL QL IA: NONREACTIVE
HCT VFR BLD AUTO: 31.6 % (ref 37–47)
HCT VFR BLD AUTO: 32.2 % (ref 37–47)
HCV AB SERPL QL IA: NONREACTIVE
HEMATOLOGIST REVIEW: NORMAL
HGB BLD-MCNC: 10.4 G/DL (ref 12–16)
HGB BLD-MCNC: 10.9 G/DL (ref 12–16)
HIV 1+2 AB+HIV1 P24 AG SERPL QL IA: NONREACTIVE
HOLD SPECIMEN: NORMAL
HOLD SPECIMEN: NORMAL
HR MV ECHO: 103 BPM
IMM GRANULOCYTES # BLD AUTO: 0 X10(3)/MCL (ref 0–0.04)
IMM GRANULOCYTES NFR BLD AUTO: 0 %
INR PPP: 1.2
INTERVENTRICULAR SEPTUM: 0.91 CM (ref 0.6–1.1)
IRON SATN MFR SERPL: 12 % (ref 20–50)
IRON SERPL-MCNC: 24 UG/DL (ref 50–170)
IVC DIAMETER: 1.9 CM
LDH SERPL-CCNC: 131 U/L (ref 125–220)
LEFT ATRIUM SIZE: 2.39 CM
LEFT ATRIUM VOLUME INDEX MOD: 11.7 ML/M2
LEFT ATRIUM VOLUME MOD: 17.73 CM3
LEFT INTERNAL DIMENSION IN SYSTOLE: 2.75 CM (ref 2.1–4)
LEFT VENTRICLE DIASTOLIC VOLUME INDEX: 46.18 ML/M2
LEFT VENTRICLE DIASTOLIC VOLUME: 70.2 ML
LEFT VENTRICLE MASS INDEX: 77 G/M2
LEFT VENTRICLE SYSTOLIC VOLUME INDEX: 18.5 ML/M2
LEFT VENTRICLE SYSTOLIC VOLUME: 28.17 ML
LEFT VENTRICULAR INTERNAL DIMENSION IN DIASTOLE: 4 CM (ref 3.5–6)
LEFT VENTRICULAR MASS: 116.5 G
LV LATERAL E/E' RATIO: 4.84 M/S
LVOT MG: 3.5 MMHG
LVOT MV: 0.86 CM/S
LYMPHOCYTES # BLD AUTO: 0.41 X10(3)/MCL (ref 0.6–4.6)
LYMPHOCYTES NFR BLD AUTO: 21.8 %
LYMPHOCYTES NFR BLD MANUAL: 0.8 X10(3)/MCL
LYMPHOCYTES NFR BLD MANUAL: 40 % (ref 13–40)
MAGNESIUM SERPL-MCNC: 2.1 MG/DL (ref 1.6–2.6)
MCH RBC QN AUTO: 29.5 PG (ref 27–31)
MCH RBC QN AUTO: 30.4 PG (ref 27–31)
MCHC RBC AUTO-ENTMCNC: 32.9 G/DL (ref 33–36)
MCHC RBC AUTO-ENTMCNC: 33.9 G/DL (ref 33–36)
MCV RBC AUTO: 89.7 FL (ref 80–94)
MCV RBC AUTO: 89.8 FL (ref 80–94)
MONOCYTES # BLD AUTO: 0.32 X10(3)/MCL (ref 0.1–1.3)
MONOCYTES NFR BLD AUTO: 17 %
MONOCYTES NFR BLD MANUAL: 0.12 X10(3)/MCL (ref 0.1–1.3)
MONOCYTES NFR BLD MANUAL: 6 % (ref 2–11)
MV MEAN GRADIENT: 3 MMHG
MV PEAK A VEL: 0.78 M/S
MV PEAK E VEL: 1.21 M/S
MV PEAK GRADIENT: 5 MMHG
MV STENOSIS PRESSURE HALF TIME: 22.18 MS
MV VALVE AREA BY CONTINUITY EQUATION: 2.47 CM2
MV VALVE AREA P 1/2 METHOD: 9.92 CM2
NEUTROPHILS # BLD AUTO: 1.13 X10(3)/MCL (ref 2.1–9.2)
NEUTROPHILS NFR BLD AUTO: 60.2 %
NEUTROPHILS NFR BLD MANUAL: 54 % (ref 47–80)
NRBC BLD AUTO-RTO: 0 %
NRBC BLD AUTO-RTO: 0 %
OHS LV EJECTION FRACTION SIMPSONS BIPLANE MOD: 63 %
PHOSPHATE SERPL-MCNC: 2.5 MG/DL (ref 2.3–4.7)
PISA TR MAX VEL: 2.4 M/S
PLATELET # BLD AUTO: 108 X10(3)/MCL (ref 130–400)
PLATELET # BLD AUTO: 109 X10(3)/MCL (ref 130–400)
PLATELET # BLD EST: ABNORMAL 10*3/UL
PLATELETS.RETICULATED NFR BLD AUTO: 2.7 % (ref 0.9–11.2)
PMV BLD AUTO: 11 FL (ref 7.4–10.4)
PMV BLD AUTO: 11.2 FL (ref 7.4–10.4)
POTASSIUM SERPL-SCNC: 3.8 MMOL/L (ref 3.5–5.1)
PROT SERPL-MCNC: 5.7 GM/DL (ref 6.4–8.3)
PROTHROMBIN TIME: 15.7 SECONDS (ref 11.4–14)
RA MAJOR: 3.56 CM
RA PRESSURE ESTIMATED: 3 MMHG
RBC # BLD AUTO: 3.52 X10(6)/MCL (ref 4.2–5.4)
RBC # BLD AUTO: 3.59 X10(6)/MCL (ref 4.2–5.4)
RET# (OHS): 0.05 X10E6/UL (ref 0.02–0.08)
RETICULOCYTE COUNT AUTOMATED (OLG): 1.35 % (ref 1.1–2.1)
RV TB RVSP: 5 MMHG
SODIUM SERPL-SCNC: 139 MMOL/L (ref 136–145)
T PALLIDUM AB SER QL: NONREACTIVE
TDI LATERAL: 0.25 M/S
TIBC SERPL-MCNC: 183 UG/DL (ref 70–310)
TIBC SERPL-MCNC: 207 UG/DL (ref 250–450)
TR MAX PG: 23 MMHG
TRANSFERRIN SERPL-MCNC: 184 MG/DL (ref 180–382)
TRICUSPID ANNULAR PLANE SYSTOLIC EXCURSION: 2.75 CM
TV REST PULMONARY ARTERY PRESSURE: 26 MMHG
VIT B12 SERPL-MCNC: 280 PG/ML (ref 213–816)
WBC # SPEC AUTO: 1.88 X10(3)/MCL (ref 4.5–11.5)
WBC # SPEC AUTO: 2.01 X10(3)/MCL (ref 4.5–11.5)
Z-SCORE OF LEFT VENTRICULAR DIMENSION IN END DIASTOLE: -1.07
Z-SCORE OF LEFT VENTRICULAR DIMENSION IN END SYSTOLE: -0.04

## 2024-01-09 PROCEDURE — 80074 ACUTE HEPATITIS PANEL: CPT

## 2024-01-09 PROCEDURE — 85610 PROTHROMBIN TIME: CPT | Performed by: STUDENT IN AN ORGANIZED HEALTH CARE EDUCATION/TRAINING PROGRAM

## 2024-01-09 PROCEDURE — 87468 ANAPLSMA PHGCYTOPHLM AMP PRB: CPT

## 2024-01-09 PROCEDURE — 63600175 PHARM REV CODE 636 W HCPCS

## 2024-01-09 PROCEDURE — 82746 ASSAY OF FOLIC ACID SERUM: CPT | Performed by: STUDENT IN AN ORGANIZED HEALTH CARE EDUCATION/TRAINING PROGRAM

## 2024-01-09 PROCEDURE — G0378 HOSPITAL OBSERVATION PER HR: HCPCS

## 2024-01-09 PROCEDURE — 25000003 PHARM REV CODE 250

## 2024-01-09 PROCEDURE — 86644 CMV ANTIBODY: CPT

## 2024-01-09 PROCEDURE — 86880 COOMBS TEST DIRECT: CPT

## 2024-01-09 PROCEDURE — 30000890 MAYO GENERIC ORDERABLE: Performed by: INTERNAL MEDICINE

## 2024-01-09 PROCEDURE — 85045 AUTOMATED RETICULOCYTE COUNT: CPT | Performed by: STUDENT IN AN ORGANIZED HEALTH CARE EDUCATION/TRAINING PROGRAM

## 2024-01-09 PROCEDURE — 82607 VITAMIN B-12: CPT | Performed by: STUDENT IN AN ORGANIZED HEALTH CARE EDUCATION/TRAINING PROGRAM

## 2024-01-09 PROCEDURE — 83540 ASSAY OF IRON: CPT

## 2024-01-09 PROCEDURE — 86780 TREPONEMA PALLIDUM: CPT

## 2024-01-09 PROCEDURE — 99900035 HC TECH TIME PER 15 MIN (STAT)

## 2024-01-09 PROCEDURE — 96372 THER/PROPH/DIAG INJ SC/IM: CPT

## 2024-01-09 PROCEDURE — 94761 N-INVAS EAR/PLS OXIMETRY MLT: CPT

## 2024-01-09 PROCEDURE — 30000890 HC MISC. SEND OUT TEST

## 2024-01-09 PROCEDURE — 86147 CARDIOLIPIN ANTIBODY EA IG: CPT

## 2024-01-09 PROCEDURE — 83615 LACTATE (LD) (LDH) ENZYME: CPT

## 2024-01-09 PROCEDURE — 80053 COMPREHEN METABOLIC PANEL: CPT

## 2024-01-09 PROCEDURE — 93005 ELECTROCARDIOGRAM TRACING: CPT

## 2024-01-09 PROCEDURE — 86663 EPSTEIN-BARR ANTIBODY: CPT

## 2024-01-09 PROCEDURE — 96361 HYDRATE IV INFUSION ADD-ON: CPT

## 2024-01-09 PROCEDURE — 86039 ANTINUCLEAR ANTIBODIES (ANA): CPT

## 2024-01-09 PROCEDURE — 85027 COMPLETE CBC AUTOMATED: CPT | Mod: 91

## 2024-01-09 PROCEDURE — 84100 ASSAY OF PHOSPHORUS: CPT

## 2024-01-09 PROCEDURE — 83735 ASSAY OF MAGNESIUM: CPT

## 2024-01-09 PROCEDURE — 87798 DETECT AGENT NOS DNA AMP: CPT | Performed by: INTERNAL MEDICINE

## 2024-01-09 PROCEDURE — 87389 HIV-1 AG W/HIV-1&-2 AB AG IA: CPT | Performed by: STUDENT IN AN ORGANIZED HEALTH CARE EDUCATION/TRAINING PROGRAM

## 2024-01-09 PROCEDURE — 85730 THROMBOPLASTIN TIME PARTIAL: CPT | Performed by: STUDENT IN AN ORGANIZED HEALTH CARE EDUCATION/TRAINING PROGRAM

## 2024-01-09 PROCEDURE — 87484 EHRLICHA CHAFFEENSIS AMP PRB: CPT

## 2024-01-09 PROCEDURE — 87798 DETECT AGENT NOS DNA AMP: CPT

## 2024-01-09 RX ORDER — PROCHLORPERAZINE MALEATE 10 MG
10 TABLET ORAL 3 TIMES DAILY PRN
Status: DISCONTINUED | OUTPATIENT
Start: 2024-01-09 | End: 2024-01-10 | Stop reason: HOSPADM

## 2024-01-09 RX ORDER — ACETAMINOPHEN 325 MG/1
650 TABLET ORAL EVERY 4 HOURS PRN
Status: DISCONTINUED | OUTPATIENT
Start: 2024-01-09 | End: 2024-01-10 | Stop reason: HOSPADM

## 2024-01-09 RX ORDER — CETIRIZINE HYDROCHLORIDE 10 MG/1
10 TABLET ORAL DAILY PRN
Status: DISCONTINUED | OUTPATIENT
Start: 2024-01-09 | End: 2024-01-10 | Stop reason: HOSPADM

## 2024-01-09 RX ADMIN — PROCHLORPERAZINE MALEATE 10 MG: 10 TABLET ORAL at 10:01

## 2024-01-09 RX ADMIN — ENOXAPARIN SODIUM 40 MG: 100 INJECTION SUBCUTANEOUS at 05:01

## 2024-01-09 RX ADMIN — PROCHLORPERAZINE MALEATE 10 MG: 10 TABLET ORAL at 08:01

## 2024-01-09 RX ADMIN — SODIUM CHLORIDE, POTASSIUM CHLORIDE, SODIUM LACTATE AND CALCIUM CHLORIDE: 600; 310; 30; 20 INJECTION, SOLUTION INTRAVENOUS at 06:01

## 2024-01-09 RX ADMIN — DOXYCYCLINE HYCLATE 100 MG: 100 TABLET, FILM COATED ORAL at 08:01

## 2024-01-09 RX ADMIN — ACETAMINOPHEN 650 MG: 325 TABLET, FILM COATED ORAL at 07:01

## 2024-01-09 RX ADMIN — SODIUM CHLORIDE, POTASSIUM CHLORIDE, SODIUM LACTATE AND CALCIUM CHLORIDE: 600; 310; 30; 20 INJECTION, SOLUTION INTRAVENOUS at 08:01

## 2024-01-09 RX ADMIN — SODIUM CHLORIDE, POTASSIUM CHLORIDE, SODIUM LACTATE AND CALCIUM CHLORIDE: 600; 310; 30; 20 INJECTION, SOLUTION INTRAVENOUS at 01:01

## 2024-01-09 NOTE — PROGRESS NOTES
[facility] Medicine Wards   Progress Note     Resident Team: [facility] Medicine List 4  Attending Physician: Barbie Morin MD  Resident: Yamilex Franco MD   Intern: Tejas Gaston DO   Date of Admit: 2024    Subjective:      Brief HPI:  Robyn Guerrero is a 23 y.o. female with a history of spontaneous , gestational diabetes during recent pregnancy approximately 10 months ago, who presented to [facility] ED on 2024  with complaint of chest pain and palpitations.  Patient states that around 730 this morning she began to experience chest pain, shortness on breath, diaphoresis.  Patient states that her symptoms are bad enough that she was considering calling EMS but instead called her  to return home from work.  Patient states that she had developed a sore throat overnight; of note, patient had a tonsillectomy around the age of 10.  Patient states that her palpitations have been ongoing since her last pregnancy around 10 months ago and that she was supposed to have a cardiology workup but this was never done; however, patient states that her symptoms a day are far worse than any that she is experienced in the past.  Patient states that over this period of time she has experienced occasional chest pain that requires her to elevate her legs upon the wall for relief.  She endorses occasional episodes of near syncope.  Additionally patient states that she had a abscess on the inside of her left thigh that she expressed green purulent material from 1 day prior to arrival.     In the ED, patient is in sinus tach, ventricular rate 140.  Metoprolol given with little effect on heart rate however patient's blood pressure began to fall at 94/54.  Patient bolus 2 L normal saline with persistent tachycardia.  COVID positive.  CBC unremarkable.  D-dimer negative CMP notable for low magnesium and phosphorus.  Lactate within normal limits A1c 4.7.  TSH within normal limits pregnancy test negative.  UA unremarkable.  Chest and  abdominal x-rays both negative for any acute findings.  Internal Medicine has been consulted for tachycardia in the setting of COVID infection.    Interval History: NAEON; Febrile hypotensive and tachycardic this am. Notably pancytopenic this am. Continues to experience diarrhea today. Continues to experience mild congestion. Denies any CP, SOB, HA.     Review of Systems:  12 point review of symptoms negative unless otherwise stated above       Objective:     Vital Signs (Most Recent):  Temp: (!) 100.5 °F (38.1 °C) (01/09/24 0818)  Pulse: (!) 115 (01/09/24 0818)  Resp: (!) 24 (01/09/24 0818)  BP: 108/68 (01/09/24 0818)  SpO2: 100 % (01/09/24 0818) Vital Signs (24h Range):  Temp:  [98.7 °F (37.1 °C)-100.7 °F (38.2 °C)] 100.5 °F (38.1 °C)  Pulse:  [101-148] 115  Resp:  [14-26] 24  SpO2:  [99 %-100 %] 100 %  BP: ()/(53-68) 108/68      Physical Exam:  Physical Exam  Vitals reviewed.   Constitutional:       General: She is not in acute distress.     Appearance: She is not ill-appearing or diaphoretic.   HENT:      Head: Normocephalic.      Nose: Nose normal.      Mouth/Throat:      Mouth: Mucous membranes are moist.      Pharynx: Oropharynx is clear.   Eyes:      Extraocular Movements: Extraocular movements intact.      Conjunctiva/sclera: Conjunctivae normal.      Pupils: Pupils are equal, round, and reactive to light.   Cardiovascular:      Rate and Rhythm: Regular rhythm. Tachycardia present.      Pulses: Normal pulses.      Heart sounds: Normal heart sounds. No murmur heard.     No friction rub. No gallop.   Pulmonary:      Effort: Pulmonary effort is normal.      Breath sounds: Normal breath sounds. No wheezing, rhonchi or rales.   Abdominal:      General: Abdomen is flat.      Palpations: Abdomen is soft.      Tenderness: There is no abdominal tenderness.   Musculoskeletal:         General: Normal range of motion.   Skin:     Findings: Lesion (left thigh; coccyx) present.   Neurological:      General: No  focal deficit present.      Mental Status: She is alert and oriented to person, place, and time.   Psychiatric:         Mood and Affect: Mood normal.         Behavior: Behavior normal.         Laboratory    CBC  Recent Labs   Lab 01/08/24  0941 01/09/24  0318 01/09/24  0642   WBC 6.18 1.88* 2.01*   RBC 4.48 3.52* 3.59*   HGB 13.2 10.4* 10.9*   HCT 39.6 31.6* 32.2*   MCV 88.4 89.8 89.7   MCH 29.5 29.5 30.4   MCHC 33.3 32.9* 33.9   RDW 12.8 13.0 14.4    109* 108*   MPV 10.9* 11.2* 11.0*       CMP   Recent Labs   Lab 01/08/24  0941 01/08/24  1646 01/09/24  0318     --  139   K 3.7  --  3.8   CHLORIDE 106  --  111*   CO2 23  --  22   BUN 8.5  --  4.1*   CREATININE 0.64  --  0.56   CALCIUM 9.3  --  8.0*   MG  --  1.50* 2.10   ALBUMIN 4.4  --  3.3*   ALKPHOS 65  --  44   ALT 9  --  8   AST 14  --  13   BILITOT 0.8  --  0.5        Microbiology:  Microbiology Results (last 7 days)       Procedure Component Value Units Date/Time    Blood Culture (site 1) [[id number]] Collected: 01/08/24 1719    Order Status: Resulted Specimen: Blood from Antecubital, Left Updated: 01/08/24 1729    Blood Culture (site 2) [[id number]] Collected: 01/08/24 1720    Order Status: Resulted Specimen: Blood from Hand, Left Updated: 01/08/24 1729    Rapid Influenza A/B [[id number]]     Order Status: Canceled Specimen: Nasopharyngeal     Influenza A & B by Molecular [[id number]]     Order Status: Canceled Specimen: Nasopharyngeal Swab             Cardiac Data:  No echocardiogram results found for the past 14 days.    Results for orders placed or performed during the hospital encounter of 01/08/24   EKG 12-lead    Collection Time: 01/09/24  6:58 AM    Narrative    Test Reason : R06.00,    Vent. Rate : 116 BPM     Atrial Rate : 116 BPM     P-R Int : 150 ms          QRS Dur : 068 ms      QT Int : 302 ms       P-R-T Axes : 052 062 024 degrees     QTc Int : 419 ms    Sinus tachycardia  Otherwise normal ECG  When compared with ECG of 08-JAN-2024  12:22,  No significant change was found    Referred By: [email or web address]   SELF           Confirmed By:         Radiology:  Imaging Results              X-Ray Abdomen Flat And Erect (Final result)  Result time 01/08/24 11:13:39      Final result by Randolph Gale MD (01/08/24 11:13:39)                   Impression:      No acute findings.      Electronically signed by: Randolph Gale  Date:    01/08/2024  Time:    11:13               Narrative:    EXAMINATION:  XR ABDOMEN FLAT AND ERECT    CLINICAL HISTORY:  Unspecified abdominal pain    COMPARISON:  None    FINDINGS:  Flat and upright views of the abdomen demonstrate a nonspecific, nonobstructive bowel gas pattern.  No large stool burden.  No free air.                                       X-Ray Chest 1 View (Final result)  Result time 01/08/24 10:49:49      Final result by Reji Lake MD (01/08/24 10:49:49)                   Impression:      No acute chest disease is identified.      Electronically signed by: Reji Lake  Date:    01/08/2024  Time:    10:49               Narrative:    EXAMINATION:  XR CHEST 1 VIEW    CLINICAL HISTORY:  tachycardia;, .    COMPARISON:  June 19, 2023    FINDINGS:  No alveolar consolidation, effusion, or pneumothorax is seen.   The thoracic aorta is normal  cardiac silhouette, central pulmonary vessels and mediastinum are normal in size and are grossly unremarkable.   visualized osseous structures are grossly unremarkable.                                      Current Medications:     Infusions:   lactated ringers 125 mL/hr at 01/09/24 0637        Scheduled:   doxycycline  100 mg Oral Q12H    enoxparin  40 mg Subcutaneous Daily        PRN:  acetaminophen, albuterol-ipratropium, aluminum-magnesium hydroxide-simethicone, dextrose 10%, dextrose 10%, glucagon (human recombinant), glucose, glucose, melatonin, naloxone, prochlorperazine, promethazine, sodium chloride 0.9%    Antibiotics and Day Number of Therapy:  Doxy BID day  2/10      Intake/Output Summary (Last 24 hours) at 1/9/2024 1103  Last data filed at 1/9/2024 0602  Gross per 24 hour   Intake 2721.24 ml   Output --   Net 2721.24 ml       Lines/Drains/Airways       Peripheral Intravenous Line  Duration                  Peripheral IV - Single Lumen 01/08/24 1016 20 G Right Antecubital 1 day                      Assessment & Plan:       COVID-19 Infection   SIRS 2/4 (HR and Temp)   - Patient presents with tachycardia, found incidentally to be COVID pos  - duonebs q6hr PRN   - Holding remdesivir and steroids 2/2 lack of concerning features   - MRSA PCR neg  - BCX x2 pending   - ESR, CRP, Ferritin neg    Pancytopenia  - WBC 6.18 >> 1.88; ANC 1.085  - H&H 13.2/39.6 >> 10.4/31.6; MCV 89  -  >> 109  - Fe panel c/w acute inflammation   - Retic count wnl; Direct mone neg, B12 and folate wnl, HIV and syphilis neg.  - F/u Lyme, Ehrlichia/Anapalsma PCRs and EFE      Sore Throat   - Patient endorses sore throat for the past week  - Tonsillectomy at the age of 10 following multiple untreated strep infections  - Strep PCR neg   - F/u Anti-ASO titers      Sinus Tachycardia  Chest Pain  - Patient reports 10 month+ h/o palpitations, near syncope, and occasional CP relieved by leg elevation; childhood h/o MORALES   - initial troponin neg  - Admitted to tele   - Echo formal read pending; EF wnl, no valvulopathy      Abscess of the Left Thigh   Pilonidal Cyst   - Patient endorses a recurrent abscess on her left thigh that she expressed purulent material from 1 day PTA; she also endorses a recurrent pilonidal cyst  - Starting doxy 100 mg BID x10 d  - Will refer to general surgery at discharge          CODE STATUS:  Full  Access:  PIV  Antibiotics:  Doxycycline - day 2  Diet:  Regular  DVT Prophylaxis:  LVX 40  GI Prophylaxis:  None  Fluids:   cc/hr      Dispo: 24 yo F admitted for tachycardia in the setting of active COVID-19 infection. currently working up pancytopenia with expected  discharge tomorrow.     Tejas Gaston DO  [facility] Internal Medicine PGY-1

## 2024-01-09 NOTE — PLAN OF CARE
01/09/24 1044   Discharge Assessment   Assessment Type Discharge Planning Assessment   Confirmed/corrected address, phone number and insurance Yes   Confirmed Demographics Correct on Facesheet   Source of Information family   When was your last doctors appointment?   (Tory Kennedy)   Reason For Admission Sinus tachycardia, Chest pain, Covid 19   People in Home significant other;parent(s)   Facility Arrived From: Home   Do you expect to return to your current living situation? Yes   Do you have help at home or someone to help you manage your care at home? Yes   Who are your caregiver(s) and their phone number(s)? Lacey Matos  Mother  [phone]    2  Joe Amador  Significant other  [phone]   Prior to hospitilization cognitive status: Alert/Oriented   Current cognitive status: Alert/Oriented   Walking or Climbing Stairs Difficulty no   Dressing/Bathing Difficulty no   Equipment Currently Used at Home none   Readmission within 30 days? No   Patient currently being followed by outpatient case management? No   Do you currently have service(s) that help you manage your care at home? No   Do you take prescription medications? Yes  (Jani - Suha Zamorano)   Do you have prescription coverage? Yes   Coverage Healthy Blue M/D   Do you have any problems affording any of your prescribed medications? No   Is the patient taking medications as prescribed? yes   Who is going to help you get home at discharge? SO   How do you get to doctors appointments? car, drives self   Are you on dialysis? No   Discharge Plan A Home with family   DME Needed Upon Discharge  none   Discharge Plan discussed with: Spouse/sig other   Name(s) and Number(s) Joe Amador  Significant other  [phone]   Transition of Care Barriers None   OTHER   Name(s) of People in Home FatherMu/Lacey Matos  Mother  [phone]    2  Joe Amador  Significant other  [phone]; 10 month old baby     Pt resides with SO, Parents &  10 month old baby; Independent with ADL's; Stay at home Mom; CM to follow for d/c planning needs.

## 2024-01-09 NOTE — PLAN OF CARE
Problem: Adult Inpatient Plan of Care  Goal: Absence of Hospital-Acquired Illness or Injury  1/9/2024 1016 by Alisa Nevarez RN  Outcome: Ongoing, Progressing  1/9/2024 1016 by Alisa Nevarez RN  Outcome: Ongoing, Progressing  Goal: Optimal Comfort and Wellbeing  1/9/2024 1016 by Alisa Nevarez RN  Outcome: Ongoing, Progressing  1/9/2024 1016 by Alisa Nevarez RN  Outcome: Ongoing, Progressing  Goal: Readiness for Transition of Care  1/9/2024 1016 by Alisa Nevarez RN  Outcome: Ongoing, Progressing  1/9/2024 1016 by Alisa Nevarez RN  Outcome: Ongoing, Progressing     Problem: Impaired Wound Healing  Goal: Optimal Wound Healing  1/9/2024 1016 by Alisa Nevarez RN  Outcome: Ongoing, Progressing  1/9/2024 1016 by Alisa Nevarez RN  Outcome: Ongoing, Progressing     Problem: Pain Acute  Goal: Acceptable Pain Control and Functional Ability  Outcome: Ongoing, Progressing

## 2024-01-09 NOTE — CONSULTS
Patient is seen at bedside to eval and treat reported left inner thigh/groin abscess. Pt states that she has had this for years that spontaneously drains off and on. No open aspect or drainage noted today. Mild erythema noted to raised area. Pt also reports a cyst to her tail bone. There appears to be a few hairs to this region that may represent a pilonidal cyst. No drainage or erythema noted. No dressings needed and nothing to culture. I agree with IM reccs to follow with surgery clinic outpatient. No further wound care reccs at this time.

## 2024-01-09 NOTE — PROGRESS NOTES
"Inpatient Nutrition Evaluation    Admit Date: 1/8/2024   Total duration of encounter: 1 day    Nutrition Recommendation/Prescription     Continue regular diet  Will order vanilla boost tid; Boost (provides 240 kcal, 10 g protein per serving)   MVI/fe  Biweekly wt  Will monitor nutrition status     Nutrition Assessment     Chart Review    Reason Seen: continuous nutrition monitoring    Malnutrition Screening Tool Results   Have you recently lost weight without trying?: No  Have you been eating poorly because of a decreased appetite?: Yes   MST Score: 1     Diagnosis:  COVID, SIRS, sore throat , tachycardia, CP, L thigh abscess, pilonidal cyst    Relevant Medical History: anxiety, gestation DM, thigh groin abscess     Nutrition-Related Medications: IVF LR @ 125ml/hr, doxycycline, miralax     Nutrition-Related Labs:  (1-9) H/H 10.9/32.2(L) Gluc 100 Bun 4.1 Cr 0.5 K 3.8     Diet Order: Diet Adult Regular  Oral Supplement Order: none  Appetite/Oral Intake: fair/50-75% of meals  Factors Affecting Nutritional Intake: decreased appetite and nausea  Food/Mormonism/Cultural Preferences: none reported  Food Allergies: none reported       Wound(s):      Altered Skin Integrity 01/08/24 2050 Left anterior Groin #1 Other (comment) Intact skin with non-blanchable redness of localized area-Tissue loss description: Not applicable as above    Comments    (1/9) Pt admitted with SOB/ nausea--decreased appetite approx week; COVID +; reported nausea continues with antibiotic tx; willing to drink oral supplement; will order. No drastic wt loss. Labs acknowledged. Pt with hx groin abscess/pilonidal cyst--reported wounds not open; boost supplement appropriate for added protein.     Anthropometrics    Height: 5' 3" (160 cm)    Last Weight: 51.3 kg (113 lb) (01/09/24 0818) Weight Method: Bed Scale  BMI (Calculated): 20  BMI Classification: normal (BMI 18.5-24.9)     Ideal Body Weight (IBW), Female: 115 lb     % Ideal Body Weight, Female (lb): " 98.26 %                    Usual Body Weight (UBW), k.4 kg  % Usual Body Weight: 98.02     Usual Weight Provided By: patient and EMR weight history    Wt Readings from Last 5 Encounters:   24 51.3 kg (113 lb)   23 52.4 kg (115 lb 8.3 oz)   23 52.9 kg (116 lb 10 oz)   23 53.3 kg (117 lb 8.1 oz)   23 54.3 kg (119 lb 12.8 oz)     Weight Change(s) Since Admission:  Admit Weight: 51.3 kg (113 lb) (24 0857)  No recent wt loss    Patient Education    Not applicable.    Monitoring & Evaluation     Dietitian will monitor food and beverage intake, weight, and glucose/endocrine profile.  Nutrition Risk/Follow-Up: low (follow-up in 5-7 days)  Patients assigned 'low nutrition risk' status do not qualify for a full nutritional assessment but will be monitored and re-evaluated in a 5-7 day time period. Please consult if re-evaluation needed sooner.

## 2024-01-09 NOTE — PLAN OF CARE
Problem: Diabetes in Pregnancy  Goal: Blood Glucose Level Within Targeted Range  Outcome: Ongoing, Progressing     Problem: Adult Inpatient Plan of Care  Goal: Plan of Care Review  Outcome: Ongoing, Progressing  Goal: Patient-Specific Goal (Individualized)  Outcome: Ongoing, Progressing  Goal: Absence of Hospital-Acquired Illness or Injury  Outcome: Ongoing, Progressing  Goal: Optimal Comfort and Wellbeing  Outcome: Ongoing, Progressing  Goal: Readiness for Transition of Care  Outcome: Ongoing, Progressing     Problem: Impaired Wound Healing  Goal: Optimal Wound Healing  Outcome: Ongoing, Progressing

## 2024-01-10 ENCOUNTER — HOSPITAL ENCOUNTER (EMERGENCY)
Facility: HOSPITAL | Age: 24
Discharge: LEFT BEFORE DISCHARGE | End: 2024-01-10
Attending: EMERGENCY MEDICINE
Payer: MEDICAID

## 2024-01-10 VITALS
BODY MASS INDEX: 20.02 KG/M2 | WEIGHT: 113 LBS | TEMPERATURE: 98 F | DIASTOLIC BLOOD PRESSURE: 65 MMHG | RESPIRATION RATE: 18 BRPM | HEART RATE: 95 BPM | OXYGEN SATURATION: 99 % | SYSTOLIC BLOOD PRESSURE: 118 MMHG | HEIGHT: 63 IN

## 2024-01-10 VITALS
HEIGHT: 63 IN | SYSTOLIC BLOOD PRESSURE: 128 MMHG | DIASTOLIC BLOOD PRESSURE: 79 MMHG | OXYGEN SATURATION: 100 % | HEART RATE: 128 BPM | TEMPERATURE: 99 F | RESPIRATION RATE: 16 BRPM | BODY MASS INDEX: 20.02 KG/M2 | WEIGHT: 113 LBS

## 2024-01-10 DIAGNOSIS — R00.0 TACHYCARDIA: Primary | ICD-10-CM

## 2024-01-10 LAB
ABS NEUT CALC (OHS): 0.59 X10(3)/MCL (ref 2.1–9.2)
ALBUMIN SERPL-MCNC: 3.3 G/DL (ref 3.5–5)
ALBUMIN SERPL-MCNC: 4.2 G/DL (ref 3.5–5)
ALBUMIN/GLOB SERPL: 1.3 RATIO (ref 1.1–2)
ALBUMIN/GLOB SERPL: 1.4 RATIO (ref 1.1–2)
ALP SERPL-CCNC: 41 UNIT/L (ref 40–150)
ALP SERPL-CCNC: 54 UNIT/L (ref 40–150)
ALT SERPL-CCNC: 12 UNIT/L (ref 0–55)
ALT SERPL-CCNC: 9 UNIT/L (ref 0–55)
ANA SER QL HEP2 SUBST: NORMAL
ANISOCYTOSIS BLD QL SMEAR: ABNORMAL
AST SERPL-CCNC: 14 UNIT/L (ref 5–34)
AST SERPL-CCNC: 17 UNIT/L (ref 5–34)
B BURGDOR DNA SPEC QL NAA+PROBE: NEGATIVE
B GAR+AFZ OPPA1 BLD QL NAA+NON-PROBE: NEGATIVE
B MAYONII OPPA1 BLD QL NAA+NON-PROBE: NEGATIVE
BASOPHILS # BLD AUTO: 0.01 X10(3)/MCL
BASOPHILS NFR BLD AUTO: 0.3 %
BILIRUB SERPL-MCNC: 0.5 MG/DL
BILIRUB SERPL-MCNC: 0.5 MG/DL
BUN SERPL-MCNC: 4.8 MG/DL (ref 7–18.7)
BUN SERPL-MCNC: 6 MG/DL (ref 7–18.7)
CALCIUM SERPL-MCNC: 8.2 MG/DL (ref 8.4–10.2)
CALCIUM SERPL-MCNC: 9.1 MG/DL (ref 8.4–10.2)
CHLORIDE SERPL-SCNC: 104 MMOL/L (ref 98–107)
CHLORIDE SERPL-SCNC: 109 MMOL/L (ref 98–107)
CO2 SERPL-SCNC: 22 MMOL/L (ref 22–29)
CO2 SERPL-SCNC: 23 MMOL/L (ref 22–29)
CREAT SERPL-MCNC: 0.56 MG/DL (ref 0.55–1.02)
CREAT SERPL-MCNC: 0.59 MG/DL (ref 0.55–1.02)
EOSINOPHIL # BLD AUTO: 0 X10(3)/MCL (ref 0–0.9)
EOSINOPHIL NFR BLD AUTO: 0 %
EOSINOPHIL NFR BLD MANUAL: 0.04 X10(3)/MCL (ref 0–0.9)
EOSINOPHIL NFR BLD MANUAL: 2 % (ref 0–8)
ERYTHROCYTE [DISTWIDTH] IN BLOOD BY AUTOMATED COUNT: 12.8 % (ref 11.5–17)
ERYTHROCYTE [DISTWIDTH] IN BLOOD BY AUTOMATED COUNT: 13.1 % (ref 11.5–17)
GFR SERPLBLD CREATININE-BSD FMLA CKD-EPI: >60 MLS/MIN/1.73/M2
GFR SERPLBLD CREATININE-BSD FMLA CKD-EPI: >60 MLS/MIN/1.73/M2
GLOBULIN SER-MCNC: 2.4 GM/DL (ref 2.4–3.5)
GLOBULIN SER-MCNC: 3.3 GM/DL (ref 2.4–3.5)
GLUCOSE SERPL-MCNC: 124 MG/DL (ref 74–100)
GLUCOSE SERPL-MCNC: 80 MG/DL (ref 74–100)
HCT VFR BLD AUTO: 33.6 % (ref 37–47)
HCT VFR BLD AUTO: 39.2 % (ref 37–47)
HGB BLD-MCNC: 11.4 G/DL (ref 12–16)
HGB BLD-MCNC: 13.3 G/DL (ref 12–16)
HOLD SPECIMEN: NORMAL
IMM GRANULOCYTES # BLD AUTO: 0 X10(3)/MCL (ref 0–0.04)
IMM GRANULOCYTES NFR BLD AUTO: 0 %
LYMPHOCYTES # BLD AUTO: 0.66 X10(3)/MCL (ref 0.6–4.6)
LYMPHOCYTES NFR BLD AUTO: 21.6 %
LYMPHOCYTES NFR BLD MANUAL: 1.14 X10(3)/MCL
LYMPHOCYTES NFR BLD MANUAL: 58 % (ref 13–40)
MAGNESIUM SERPL-MCNC: 1.5 MG/DL (ref 1.6–2.6)
MAYO GENERIC ORDERABLE RESULT: NORMAL
MCH RBC QN AUTO: 29.7 PG (ref 27–31)
MCH RBC QN AUTO: 30.2 PG (ref 27–31)
MCHC RBC AUTO-ENTMCNC: 33.9 G/DL (ref 33–36)
MCHC RBC AUTO-ENTMCNC: 33.9 G/DL (ref 33–36)
MCV RBC AUTO: 87.5 FL (ref 80–94)
MCV RBC AUTO: 89.1 FL (ref 80–94)
MICROBIOLOGIST REVIEW: NORMAL
MONOCYTES # BLD AUTO: 0.26 X10(3)/MCL (ref 0.1–1.3)
MONOCYTES NFR BLD AUTO: 8.5 %
MONOCYTES NFR BLD MANUAL: 0.2 X10(3)/MCL (ref 0.1–1.3)
MONOCYTES NFR BLD MANUAL: 10 % (ref 2–11)
NEUTROPHILS # BLD AUTO: 2.12 X10(3)/MCL (ref 2.1–9.2)
NEUTROPHILS NFR BLD AUTO: 69.6 %
NEUTROPHILS NFR BLD MANUAL: 30 % (ref 47–80)
NRBC BLD AUTO-RTO: 0 %
NRBC BLD AUTO-RTO: 0 %
PLATELET # BLD AUTO: 116 X10(3)/MCL (ref 130–400)
PLATELET # BLD AUTO: 142 X10(3)/MCL (ref 130–400)
PLATELET # BLD EST: ABNORMAL 10*3/UL
PMV BLD AUTO: 10.3 FL (ref 7.4–10.4)
PMV BLD AUTO: 11.1 FL (ref 7.4–10.4)
POTASSIUM SERPL-SCNC: 3.9 MMOL/L (ref 3.5–5.1)
POTASSIUM SERPL-SCNC: 4.1 MMOL/L (ref 3.5–5.1)
PROT SERPL-MCNC: 5.7 GM/DL (ref 6.4–8.3)
PROT SERPL-MCNC: 7.5 GM/DL (ref 6.4–8.3)
RBC # BLD AUTO: 3.77 X10(6)/MCL (ref 4.2–5.4)
RBC # BLD AUTO: 4.48 X10(6)/MCL (ref 4.2–5.4)
RBC MORPH BLD: ABNORMAL
SODIUM SERPL-SCNC: 137 MMOL/L (ref 136–145)
SODIUM SERPL-SCNC: 139 MMOL/L (ref 136–145)
TROPONIN I SERPL-MCNC: 0.01 NG/ML (ref 0–0.04)
WBC # SPEC AUTO: 1.97 X10(3)/MCL (ref 4.5–11.5)
WBC # SPEC AUTO: 3.05 X10(3)/MCL (ref 4.5–11.5)

## 2024-01-10 PROCEDURE — 83735 ASSAY OF MAGNESIUM: CPT

## 2024-01-10 PROCEDURE — 86747 PARVOVIRUS ANTIBODY: CPT

## 2024-01-10 PROCEDURE — 63600175 PHARM REV CODE 636 W HCPCS

## 2024-01-10 PROCEDURE — 94761 N-INVAS EAR/PLS OXIMETRY MLT: CPT

## 2024-01-10 PROCEDURE — 84484 ASSAY OF TROPONIN QUANT: CPT | Performed by: FAMILY MEDICINE

## 2024-01-10 PROCEDURE — 80053 COMPREHEN METABOLIC PANEL: CPT | Performed by: FAMILY MEDICINE

## 2024-01-10 PROCEDURE — 96366 THER/PROPH/DIAG IV INF ADDON: CPT

## 2024-01-10 PROCEDURE — 99900035 HC TECH TIME PER 15 MIN (STAT)

## 2024-01-10 PROCEDURE — 80053 COMPREHEN METABOLIC PANEL: CPT

## 2024-01-10 PROCEDURE — 96361 HYDRATE IV INFUSION ADD-ON: CPT

## 2024-01-10 PROCEDURE — 99284 EMERGENCY DEPT VISIT MOD MDM: CPT

## 2024-01-10 PROCEDURE — 85027 COMPLETE CBC AUTOMATED: CPT | Performed by: FAMILY MEDICINE

## 2024-01-10 PROCEDURE — 25000003 PHARM REV CODE 250

## 2024-01-10 PROCEDURE — 85027 COMPLETE CBC AUTOMATED: CPT

## 2024-01-10 PROCEDURE — G0378 HOSPITAL OBSERVATION PER HR: HCPCS

## 2024-01-10 RX ORDER — PROCHLORPERAZINE MALEATE 10 MG
10 TABLET ORAL 3 TIMES DAILY PRN
Qty: 90 TABLET | Refills: 0 | Status: ON HOLD | OUTPATIENT
Start: 2024-01-10 | End: 2024-01-13 | Stop reason: HOSPADM

## 2024-01-10 RX ORDER — ONDANSETRON HYDROCHLORIDE 2 MG/ML
4 INJECTION, SOLUTION INTRAVENOUS EVERY 6 HOURS PRN
Status: DISCONTINUED | OUTPATIENT
Start: 2024-01-10 | End: 2024-01-11 | Stop reason: HOSPADM

## 2024-01-10 RX ORDER — MAGNESIUM SULFATE HEPTAHYDRATE 40 MG/ML
2 INJECTION, SOLUTION INTRAVENOUS
Status: COMPLETED | OUTPATIENT
Start: 2024-01-10 | End: 2024-01-10

## 2024-01-10 RX ORDER — DOXYCYCLINE HYCLATE 100 MG
100 TABLET ORAL EVERY 12 HOURS
Qty: 15 TABLET | Refills: 0 | Status: SHIPPED | OUTPATIENT
Start: 2024-01-10 | End: 2024-01-16

## 2024-01-10 RX ORDER — SODIUM CHLORIDE 9 MG/ML
1000 INJECTION, SOLUTION INTRAVENOUS
Status: DISCONTINUED | OUTPATIENT
Start: 2024-01-10 | End: 2024-01-11 | Stop reason: HOSPADM

## 2024-01-10 RX ADMIN — MAGNESIUM SULFATE HEPTAHYDRATE 2 G: 40 INJECTION, SOLUTION INTRAVENOUS at 10:01

## 2024-01-10 RX ADMIN — SODIUM CHLORIDE, POTASSIUM CHLORIDE, SODIUM LACTATE AND CALCIUM CHLORIDE: 600; 310; 30; 20 INJECTION, SOLUTION INTRAVENOUS at 04:01

## 2024-01-10 RX ADMIN — PROCHLORPERAZINE MALEATE 10 MG: 10 TABLET ORAL at 08:01

## 2024-01-10 RX ADMIN — ACETAMINOPHEN 650 MG: 325 TABLET, FILM COATED ORAL at 05:01

## 2024-01-10 RX ADMIN — MAGNESIUM SULFATE HEPTAHYDRATE 2 G: 40 INJECTION, SOLUTION INTRAVENOUS at 08:01

## 2024-01-10 RX ADMIN — DOXYCYCLINE HYCLATE 100 MG: 100 TABLET, FILM COATED ORAL at 08:01

## 2024-01-10 NOTE — PLAN OF CARE
Problem: Adult Inpatient Plan of Care  Goal: Absence of Hospital-Acquired Illness or Injury  Outcome: Ongoing, Progressing  Goal: Optimal Comfort and Wellbeing  Outcome: Ongoing, Progressing  Goal: Readiness for Transition of Care  Outcome: Ongoing, Progressing     Problem: Impaired Wound Healing  Goal: Optimal Wound Healing  Outcome: Ongoing, Progressing     Problem: Pain Acute  Goal: Acceptable Pain Control and Functional Ability  Outcome: Ongoing, Progressing     Problem: Infection  Goal: Absence of Infection Signs and Symptoms  Outcome: Ongoing, Progressing     Problem: Gas Exchange Impaired  Goal: Optimal Gas Exchange  Outcome: Met     Problem: Chest Pain  Goal: Resolution of Chest Pain Symptoms  Outcome: Met

## 2024-01-10 NOTE — DISCHARGE SUMMARY
[facility] Medicine Discharge Summary    Admitting Physician: Barbie Morin MD  Attending Physician: Barbie Morin MD  Date of Admit: 2024  Date of Discharge: 1/10/2024    Condition: Good  Outcome: Condition has improved and patient is now ready for discharge.  DISPOSITION: Home or Self Care    Discharge Diagnoses     Patient Active Problem List   Diagnosis    Threatened     Anxiety disorder    Sinus tachycardia by electrocardiography    Gestational diabetes mellitus (GDM) in third trimester    Irregular uterine contractions    Anxiety during pregnancy    Vacuum extractor delivery, delivered    COVID-19       Principal Problem:  COVID-19    Consultants and Procedures     Consultants:  Consults (From admission, onward)          Status Ordering Provider     Inpatient consult to Internal Medicine  Once        Provider:  Tejas Gaston DO Acknowledged ARDOIN, SARAH LALANDE             Procedures:   * No surgery found *     Brief History of Present Illness      Robyn Guerrero is a 23 y.o. female with a history of spontaneous , gestational diabetes during recent pregnancy approximately 10 months ago, who presented to [facility] ED on 2024  with complaint of chest pain and palpitations.  Patient states that around 730 this morning she began to experience chest pain, shortness on breath, diaphoresis.  Patient states that her symptoms are bad enough that she was considering calling EMS but instead called her  to return home from work.  Patient states that she had developed a sore throat overnight; of note, patient had a tonsillectomy around the age of 10.  Patient states that her palpitations have been ongoing since her last pregnancy around 10 months ago and that she was supposed to have a cardiology workup but this was never done; however, patient states that her symptoms a day are far worse than any that she is experienced in the past.  Patient states that over this period of time she  has experienced occasional chest pain that requires her to elevate her legs upon the wall for relief.  She endorses occasional episodes of near syncope.  Additionally patient states that she had a abscess on the inside of her left thigh that she expressed green purulent material from 1 day prior to arrival.       Hospital Course with Pertinent Findings     In the ED, patient is in sinus tach, ventricular rate 140.  Metoprolol given with little effect on heart rate however patient's blood pressure began to fall at 94/54.  Patient bolus 2 L normal saline with persistent tachycardia.  COVID positive.  CBC unremarkable.  D-dimer negative CMP notable for low magnesium and phosphorus.  Lactate within normal limits A1c 4.7.  TSH within normal limits pregnancy test negative.  UA unremarkable.  Chest and abdominal x-rays both negative for any acute findings.  Internal Medicine has been consulted for tachycardia in the setting of COVID infection.      Tachycardia resolved during stay.  Patient febrile with a T-max of 100.7°.  Blood pressure remains soft throughout stay.  CBC notable for pancytopenia on day 1 of admission.  On discharge H / H  11.4/33.6, WBC 1.97, platelets 116.  Inflammatory markers within normal limits.  CMP largely unremarkable.  Magnesium replaced.  Echo below.      Lab Results   Component Value Date     01/10/2024    K 3.9 01/10/2024    CO2 23 01/10/2024    BUN 4.8 (L) 01/10/2024    CREATININE 0.56 01/10/2024    CALCIUM 8.2 (L) 01/10/2024    BILIDIR 0.4 06/14/2021    IBILI 0.40 06/14/2021    ALKPHOS 41 01/10/2024    AST 14 01/10/2024    ALT 9 01/10/2024    MG 1.50 (L) 01/10/2024    PHOS 2.5 01/09/2024        Lab Results   Component Value Date    WBC 1.97 (LL) 01/10/2024    RBC 3.77 (L) 01/10/2024    HGB 11.4 (L) 01/10/2024    HCT 33.6 (L) 01/10/2024    MCV 89.1 01/10/2024    MCH 30.2 01/10/2024    MCHC 33.9 01/10/2024    RDW 13.1 01/10/2024     (L) 01/10/2024    MPV 11.1 (H) 01/10/2024          Microbiology Data:  Microbiology Results (last 7 days)       Procedure Component Value Units Date/Time    Blood Culture (site 1) [[id number]]  (Normal) Collected: 01/08/24 1719    Order Status: Completed Specimen: Blood from Antecubital, Left Updated: 01/09/24 1900     CULTURE, BLOOD ([facility]) No Growth At 24 Hours    Blood Culture (site 2) [[id number]]  (Normal) Collected: 01/08/24 1720    Order Status: Completed Specimen: Blood from Hand, Left Updated: 01/09/24 1900     CULTURE, BLOOD ([facility]) No Growth At 24 Hours    Rapid Influenza A/B [[id number]]     Order Status: Canceled Specimen: Nasopharyngeal     Influenza A & B by Molecular [[id number]]     Order Status: Canceled Specimen: Nasopharyngeal Swab             Cardiac Data:  Echo Saline Bubble? No    Result Date: 1/9/2024    Left Ventricle: The left ventricle is normal in size. Normal wall   thickness. There is normal systolic function. Biplane (2D) method of discs   ejection fraction is 63%.    Right Ventricle: Normal right ventricular cavity size. Systolic   function is normal.    IVC/SVC: Normal venous pressure at 3 mmHg.         Results for orders placed or performed during the hospital encounter of 01/08/24   EKG 12-lead    Collection Time: 01/09/24  1:38 PM    Narrative    Test Reason : I45.5,    Vent. Rate : 117 BPM     Atrial Rate : 117 BPM     P-R Int : 146 ms          QRS Dur : 072 ms      QT Int : 306 ms       P-R-T Axes : 063 063 047 degrees     QTc Int : 426 ms    Sinus tachycardia  Otherwise normal ECG  When compared with ECG of 09-JAN-2024 06:58,  No significant change was found  Confirmed by Dc James MD ([id number]) on 1/9/2024 8:44:51 PM    Referred By: [email or web address]   SELF           Confirmed By:Dc James MD        Radiology:  Imaging Results              X-Ray Abdomen Flat And Erect (Final result)  Result time 01/08/24 11:13:39      Final result by Randolph Gale MD (01/08/24 11:13:39)                   Impression:      No acute  findings.      Electronically signed by: Randolph Gale  Date:    01/08/2024  Time:    11:13               Narrative:    EXAMINATION:  XR ABDOMEN FLAT AND ERECT    CLINICAL HISTORY:  Unspecified abdominal pain    COMPARISON:  None    FINDINGS:  Flat and upright views of the abdomen demonstrate a nonspecific, nonobstructive bowel gas pattern.  No large stool burden.  No free air.                                       X-Ray Chest 1 View (Final result)  Result time 01/08/24 10:49:49      Final result by Reji Lake MD (01/08/24 10:49:49)                   Impression:      No acute chest disease is identified.      Electronically signed by: Reji Lake  Date:    01/08/2024  Time:    10:49               Narrative:    EXAMINATION:  XR CHEST 1 VIEW    CLINICAL HISTORY:  tachycardia;, .    COMPARISON:  June 19, 2023    FINDINGS:  No alveolar consolidation, effusion, or pneumothorax is seen.   The thoracic aorta is normal  cardiac silhouette, central pulmonary vessels and mediastinum are normal in size and are grossly unremarkable.   visualized osseous structures are grossly unremarkable.                                         Discharge physical exam:  Vitals:    01/10/24 0805   BP: 104/69   Pulse: 82   Resp:    Temp: 97.3 °F (36.3 °C)       GEN: A&Ox4. No acute distress   HEENT: normocephalic, atraumatic. PERRLA. EMOI bilaterally. Sclera, conjunctiva clear. Nares patents. Oropharyngeal mucosa moist, non-erythematous, non-edematous, uvula midline. Neck supple without LAD   CARDIAC: tachycardic; S1 S2, no MRG. Radial pulses full, no LE edema   RESPI: Chest wall rise symmetric, atraumatic. Lungs CTAB, no wheezing or rhonchi   ABDOMEN: soft, nontender, BS present in all 4 quadrants. No herniation, masses, HSM  : deferred   MSK: ROM grossly intact.   NEURO: Motor and sensation grossly intact. CN grossly intact. No cerebellar deficits noted. No gait abnormalities noted.   PSYCH: Memory and thought process appear  intact. Appropriate mood and affect. No AI/HI. No HI/SI .       TIME SPENT ON DISCHARGE: 60 minutes    Discharge Medications        Medication List        START taking these medications      doxycycline 100 MG tablet  Commonly known as: VIBRA-TABS  Take 1 tablet (100 mg total) by mouth every 12 (twelve) hours. for 15 doses            STOP taking these medications      dicyclomine 10 MG capsule  Commonly known as: BENTYL     ondansetron 4 MG Tbdl  Commonly known as: ZOFRAN-ODT     PRENATAL VITAMIN ORAL               Where to Get Your Medications        These medications were sent to [address] [address]      Phone: [phone]   doxycycline 100 MG tablet         Discharge Information:     Ms. Robyn Guerrero is being discharged Home or Self Care.    Discharge Procedure Orders   CBC Auto Differential   Standing Status: Future Standing Exp. Date: 07/10/25   Order Comments: Please obtain prior to next IM clinic visit     Ambulatory referral/consult to Internal Medicine   Standing Status: Future   Referral Priority: Urgent Referral Type: Consultation   Referral Reason: Specialty Services Required   Requested Specialty: Internal Medicine   Number of Visits Requested: 1     Ambulatory referral/consult to Cardiology   Standing Status: Future   Referral Priority: Urgent Referral Type: Consultation   Referral Reason: Specialty Services Required   Requested Specialty: Cardiology   Number of Visits Requested: 1        Follow-Up Appointments:   Follow-up Information       [facility] - Emergency Dept.    Specialty: Emergency Medicine  Why: If symptoms worsen return to ED immediately  Contact information:  [facility] [address] [phone]             Tory Kennedy FNP In 2 days.    Specialty: Family Medicine  Contact information:  [address] [place] [address] [phone]                [facility] - Cardiology In 2 days.    Specialty: Cardiology  Contact information:  [address] [place] [address] [phone]                           To address at follow-up:  -The following labs are to be drawn at the [facility] visit: CBC, CMP, Mag  -The following imaging studies are to be ordered at the [facility] visit: None     The above information was discussed with the patient in clear terms. She was able to repeat the instructions to me in her own words. All questions answered. ED precautions provided.    Tejas Gaston,    PGY-1 Internal Medicine

## 2024-01-10 NOTE — PLAN OF CARE
Problem: Diabetes in Pregnancy  Goal: Blood Glucose Level Within Targeted Range  Outcome: Ongoing, Progressing     Problem: Adult Inpatient Plan of Care  Goal: Plan of Care Review  Outcome: Ongoing, Progressing  Goal: Patient-Specific Goal (Individualized)  Outcome: Ongoing, Progressing  Goal: Absence of Hospital-Acquired Illness or Injury  Outcome: Ongoing, Progressing  Goal: Optimal Comfort and Wellbeing  Outcome: Ongoing, Progressing  Goal: Readiness for Transition of Care  Outcome: Ongoing, Progressing     Problem: Impaired Wound Healing  Goal: Optimal Wound Healing  Outcome: Ongoing, Progressing     Problem: Pain Acute  Goal: Acceptable Pain Control and Functional Ability  Outcome: Ongoing, Progressing     Problem: Infection  Goal: Absence of Infection Signs and Symptoms  Outcome: Ongoing, Progressing     Problem: Gas Exchange Impaired  Goal: Optimal Gas Exchange  Outcome: Ongoing, Progressing     Problem: Chest Pain  Goal: Resolution of Chest Pain Symptoms  Outcome: Ongoing, Progressing     Problem: Dysrhythmia  Goal: Normalized Cardiac Rhythm  Outcome: Ongoing, Progressing

## 2024-01-11 ENCOUNTER — HOSPITAL ENCOUNTER (OUTPATIENT)
Facility: HOSPITAL | Age: 24
Discharge: HOME | End: 2024-01-13
Attending: EMERGENCY MEDICINE | Admitting: INTERNAL MEDICINE
Payer: MEDICAID

## 2024-01-11 DIAGNOSIS — F41.9 ANXIETY: ICD-10-CM

## 2024-01-11 DIAGNOSIS — E87.20 LACTIC ACIDOSIS: Primary | ICD-10-CM

## 2024-01-11 DIAGNOSIS — U07.1 COVID: ICD-10-CM

## 2024-01-11 DIAGNOSIS — R00.0 TACHYCARDIA: ICD-10-CM

## 2024-01-11 DIAGNOSIS — E86.0 DEHYDRATION: ICD-10-CM

## 2024-01-11 PROBLEM — R25.1 TREMOR: Status: ACTIVE | Noted: 2024-01-11

## 2024-01-11 LAB
ALBUMIN SERPL-MCNC: 4.4 G/DL (ref 3.5–5)
ALBUMIN/GLOB SERPL: 1.3 RATIO (ref 1.1–2)
ALP SERPL-CCNC: 54 UNIT/L (ref 40–150)
ALT SERPL-CCNC: 11 UNIT/L (ref 0–55)
APPEARANCE UR: CLEAR
ASO AB SERPL-ACNC: <20 IU/ML (ref 0–530)
AST SERPL-CCNC: 18 UNIT/L (ref 5–34)
B19V IGG SER QL IA: NEGATIVE
B19V IGG+IGM SER-IMP: NORMAL
B19V IGM SER QL IA: NEGATIVE
BACTERIA #/AREA URNS AUTO: ABNORMAL /HPF
BASOPHILS # BLD AUTO: 0.02 X10(3)/MCL
BASOPHILS NFR BLD AUTO: 0.2 %
BILIRUB SERPL-MCNC: 0.7 MG/DL
BILIRUB UR QL STRIP.AUTO: NEGATIVE
BUN SERPL-MCNC: 7.9 MG/DL (ref 7–18.7)
CALCIUM SERPL-MCNC: 9.5 MG/DL (ref 8.4–10.2)
CARDIOLIPIN IGG SER IA-ACNC: <9.4 GPL
CARDIOLIPIN IGM SER IA-ACNC: <9.4 MPL
CHLORIDE SERPL-SCNC: 104 MMOL/L (ref 98–107)
CK SERPL-CCNC: <140 U/L (ref 29–168)
CMV IGG SERPL QL IA: POSITIVE
CMV IGM SERPL QL IA: NEGATIVE
CO2 SERPL-SCNC: 18 MMOL/L (ref 22–29)
COLOR UR AUTO: COLORLESS
CREAT SERPL-MCNC: 0.74 MG/DL (ref 0.55–1.02)
EBV EA IGG SER QL: NEGATIVE
EOSINOPHIL # BLD AUTO: 0.01 X10(3)/MCL (ref 0–0.9)
EOSINOPHIL NFR BLD AUTO: 0.1 %
ERYTHROCYTE [DISTWIDTH] IN BLOOD BY AUTOMATED COUNT: 13 % (ref 11.5–17)
EST. AVERAGE GLUCOSE BLD GHB EST-MCNC: 88.2 MG/DL
GFR SERPLBLD CREATININE-BSD FMLA CKD-EPI: >60 MLS/MIN/1.73/M2
GLOBULIN SER-MCNC: 3.5 GM/DL (ref 2.4–3.5)
GLUCOSE SERPL-MCNC: 236 MG/DL (ref 74–100)
GLUCOSE UR QL STRIP.AUTO: ABNORMAL
HBA1C MFR BLD: 4.7 %
HCT VFR BLD AUTO: 40.8 % (ref 37–47)
HGB BLD-MCNC: 14 G/DL (ref 12–16)
HOLD SPECIMEN: NORMAL
HYALINE CASTS #/AREA URNS LPF: ABNORMAL /LPF
IMM GRANULOCYTES # BLD AUTO: 0.03 X10(3)/MCL (ref 0–0.04)
IMM GRANULOCYTES NFR BLD AUTO: 0.3 %
KETONES UR QL STRIP.AUTO: ABNORMAL
LACTATE SERPL-SCNC: 1.2 MMOL/L (ref 0.5–2.2)
LACTATE SERPL-SCNC: 3.2 MMOL/L (ref 0.5–2.2)
LEUKOCYTE ESTERASE UR QL STRIP.AUTO: NEGATIVE
LYMPHOCYTES # BLD AUTO: 2.45 X10(3)/MCL (ref 0.6–4.6)
LYMPHOCYTES NFR BLD AUTO: 22 %
MCH RBC QN AUTO: 29.9 PG (ref 27–31)
MCHC RBC AUTO-ENTMCNC: 34.3 G/DL (ref 33–36)
MCV RBC AUTO: 87.2 FL (ref 80–94)
MONOCYTES # BLD AUTO: 0.57 X10(3)/MCL (ref 0.1–1.3)
MONOCYTES NFR BLD AUTO: 5.1 %
MUCOUS THREADS URNS QL MICRO: ABNORMAL /LPF
NEUTROPHILS # BLD AUTO: 8.08 X10(3)/MCL (ref 2.1–9.2)
NEUTROPHILS NFR BLD AUTO: 72.3 %
NITRITE UR QL STRIP.AUTO: NEGATIVE
NRBC BLD AUTO-RTO: 0 %
PH UR STRIP.AUTO: 5.5 [PH]
PLATELET # BLD AUTO: 245 X10(3)/MCL (ref 130–400)
PMV BLD AUTO: 10.7 FL (ref 7.4–10.4)
POTASSIUM SERPL-SCNC: 3.8 MMOL/L (ref 3.5–5.1)
PROT SERPL-MCNC: 7.9 GM/DL (ref 6.4–8.3)
PROT UR QL STRIP.AUTO: NEGATIVE
RBC # BLD AUTO: 4.68 X10(6)/MCL (ref 4.2–5.4)
RBC #/AREA URNS AUTO: ABNORMAL /HPF
RBC UR QL AUTO: NEGATIVE
SODIUM SERPL-SCNC: 136 MMOL/L (ref 136–145)
SP GR UR STRIP.AUTO: 1.01 (ref 1–1.03)
SQUAMOUS #/AREA URNS LPF: ABNORMAL /HPF
UROBILINOGEN UR STRIP-ACNC: NORMAL
WBC # SPEC AUTO: 11.16 X10(3)/MCL (ref 4.5–11.5)
WBC #/AREA URNS AUTO: ABNORMAL /HPF

## 2024-01-11 PROCEDURE — 80053 COMPREHEN METABOLIC PANEL: CPT | Performed by: FAMILY MEDICINE

## 2024-01-11 PROCEDURE — 82550 ASSAY OF CK (CPK): CPT | Performed by: FAMILY MEDICINE

## 2024-01-11 PROCEDURE — G0378 HOSPITAL OBSERVATION PER HR: HCPCS

## 2024-01-11 PROCEDURE — 93005 ELECTROCARDIOGRAM TRACING: CPT

## 2024-01-11 PROCEDURE — 96374 THER/PROPH/DIAG INJ IV PUSH: CPT

## 2024-01-11 PROCEDURE — 96375 TX/PRO/DX INJ NEW DRUG ADDON: CPT

## 2024-01-11 PROCEDURE — 85025 COMPLETE CBC W/AUTO DIFF WBC: CPT | Performed by: FAMILY MEDICINE

## 2024-01-11 PROCEDURE — 63600175 PHARM REV CODE 636 W HCPCS: Performed by: FAMILY MEDICINE

## 2024-01-11 PROCEDURE — 94761 N-INVAS EAR/PLS OXIMETRY MLT: CPT

## 2024-01-11 PROCEDURE — 25000003 PHARM REV CODE 250: Performed by: FAMILY MEDICINE

## 2024-01-11 PROCEDURE — 99285 EMERGENCY DEPT VISIT HI MDM: CPT | Mod: 25

## 2024-01-11 PROCEDURE — 87040 BLOOD CULTURE FOR BACTERIA: CPT | Mod: 91 | Performed by: FAMILY MEDICINE

## 2024-01-11 PROCEDURE — 81001 URINALYSIS AUTO W/SCOPE: CPT | Performed by: FAMILY MEDICINE

## 2024-01-11 PROCEDURE — 83605 ASSAY OF LACTIC ACID: CPT | Performed by: FAMILY MEDICINE

## 2024-01-11 PROCEDURE — 83036 HEMOGLOBIN GLYCOSYLATED A1C: CPT | Performed by: FAMILY MEDICINE

## 2024-01-11 PROCEDURE — 96361 HYDRATE IV INFUSION ADD-ON: CPT

## 2024-01-11 RX ORDER — LORAZEPAM 2 MG/ML
1 INJECTION INTRAMUSCULAR
Status: COMPLETED | OUTPATIENT
Start: 2024-01-11 | End: 2024-01-11

## 2024-01-11 RX ORDER — DOXYCYCLINE HYCLATE 100 MG
100 TABLET ORAL EVERY 12 HOURS
Status: DISCONTINUED | OUTPATIENT
Start: 2024-01-12 | End: 2024-01-13 | Stop reason: HOSPADM

## 2024-01-11 RX ORDER — DIPHENHYDRAMINE HYDROCHLORIDE 50 MG/ML
50 INJECTION INTRAMUSCULAR; INTRAVENOUS ONCE
Status: COMPLETED | OUTPATIENT
Start: 2024-01-11 | End: 2024-01-11

## 2024-01-11 RX ORDER — LORAZEPAM 2 MG/ML
2 INJECTION INTRAMUSCULAR
Status: DISCONTINUED | OUTPATIENT
Start: 2024-01-11 | End: 2024-01-11

## 2024-01-11 RX ORDER — TALC
6 POWDER (GRAM) TOPICAL NIGHTLY PRN
Status: DISCONTINUED | OUTPATIENT
Start: 2024-01-12 | End: 2024-01-13 | Stop reason: HOSPADM

## 2024-01-11 RX ORDER — DIPHENHYDRAMINE HYDROCHLORIDE 50 MG/ML
25 INJECTION INTRAMUSCULAR; INTRAVENOUS ONCE
Status: DISCONTINUED | OUTPATIENT
Start: 2024-01-11 | End: 2024-01-11

## 2024-01-11 RX ORDER — SODIUM CHLORIDE 0.9 % (FLUSH) 0.9 %
10 SYRINGE (ML) INJECTION
Status: DISCONTINUED | OUTPATIENT
Start: 2024-01-12 | End: 2024-01-13 | Stop reason: HOSPADM

## 2024-01-11 RX ORDER — ENOXAPARIN SODIUM 100 MG/ML
40 INJECTION SUBCUTANEOUS EVERY 24 HOURS
Status: DISCONTINUED | OUTPATIENT
Start: 2024-01-12 | End: 2024-01-13 | Stop reason: HOSPADM

## 2024-01-11 RX ADMIN — DIPHENHYDRAMINE HYDROCHLORIDE 50 MG: 50 INJECTION INTRAMUSCULAR; INTRAVENOUS at 09:01

## 2024-01-11 RX ADMIN — LORAZEPAM 1 MG: 2 INJECTION INTRAMUSCULAR; INTRAVENOUS at 09:01

## 2024-01-11 RX ADMIN — SODIUM CHLORIDE 3000 ML: 9 INJECTION, SOLUTION INTRAVENOUS at 09:01

## 2024-01-11 NOTE — ED PROVIDER NOTES
Encounter Date: 1/10/2024       History     Chief Complaint   Patient presents with    Tachycardia     Pt reports tachycardia x 15 minutes.  Reports tightness to the chest since 1930.  MO'ed from 4W today.     HPI  This patient is a 23-year-old female with past medical history of prior spontaneous , anxiety, recent COVID-19 infection who presented to the ER with complaints of chest pain, lightheadedness, nausea, abdominal pain and tachycardia.  She was recently admitted to the hospital for roughly 2 days due to tachycardia that was persistent and fever.  Her hospital stay demonstrated that she had a normal echocardiogram, her tachycardia improved with 2 L of fluids, she did have poor response to metoprolol for the tachycardia with low blood pressure.  She was discharged with cardiology follow-up.  After discharge she states that she was told if she were to develop chest pain again to present back to the ER.  She states that for the roughly the past 3 hours she has had chest pressure and tightness.  She is lightheaded when she stands up.  She states that her watch notified her that her heart rate was significantly elevated as well.      Review of patient's allergies indicates:   Allergen Reactions    House dust Hives, Itching, Nausea And Vomiting and Shortness Of Breath     Past Medical History:   Diagnosis Date    Anxiety disorder, unspecified     Irregular uterine contractions      Past Surgical History:   Procedure Laterality Date    TONSILECTOMY      TYMPANOSTOMY TUBE PLACEMENT       Family History   Problem Relation Age of Onset    Lung cancer Maternal Aunt     Uterine cancer Maternal Aunt     Breast cancer Neg Hx     Colon cancer Neg Hx     Ovarian cancer Neg Hx     Cervical cancer Neg Hx      Social History     Tobacco Use    Smoking status: Never     Passive exposure: Never    Smokeless tobacco: Never   Substance Use Topics    Alcohol use: Not Currently    Drug use: Never     Comment: CBD use      Review of Systems  Constitutional: no fever, fatigue, weakness  Eye: no vision loss, eye redness, drainage, or pain  ENMT: no sore throat, ear pain, sinus pain/congestion, nasal congestion/drainage  Respiratory: no cough, no wheezing, no shortness of breath  Cardiovascular: + chest pain, + palpitations, no edema  Gastrointestinal: no nausea, vomiting, or diarrhea. No abdominal pain  Genitourinary: no dysuria, no urinary frequency or urgency, no hematuria  Hema/Lymph: no abnormal bruising or bleeding  Endocrine: no heat or cold intolerance, no excessive thirst or excessive urination  Musculoskeletal: no muscle or joint pain, no joint swelling  Integumentary: no skin rash or abnormal lesion  Neurologic: + headache, no dizziness, no weakness or numbness   Physical Exam     Initial Vitals   BP Pulse Resp Temp SpO2   01/10/24 2016 01/10/24 2016 01/10/24 2020 01/10/24 2016 01/10/24 2016   128/79 (!) 128 16 98.8 °F (37.1 °C) 100 %      MAP       --                Physical Exam    Nursing note and vitals reviewed.  Constitutional: She appears well-developed and well-nourished. She is diaphoretic. No distress.   HENT:   Head: Normocephalic and atraumatic.   Mouth/Throat: No oropharyngeal exudate.   Eyes: Conjunctivae and EOM are normal. Pupils are equal, round, and reactive to light. Right eye exhibits no discharge. Left eye exhibits no discharge. No scleral icterus.   Neck: Neck supple. No thyromegaly present. No tracheal deviation present. No JVD present.   Normal range of motion.  Cardiovascular:  Regular rhythm and normal heart sounds.     Exam reveals no gallop and no friction rub.       No murmur heard.  Pulmonary/Chest: Breath sounds normal. No stridor. No respiratory distress. She has no wheezes. She has no rhonchi. She has no rales.   Abdominal: Abdomen is soft. She exhibits no distension. There is no abdominal tenderness. There is no rebound and no guarding.   Musculoskeletal:         General: No tenderness or  edema. Normal range of motion.      Cervical back: Normal range of motion and neck supple.     Neurological: She is alert and oriented to person, place, and time. She has normal strength. No cranial nerve deficit or sensory deficit. GCS score is 15. GCS eye subscore is 4. GCS verbal subscore is 5. GCS motor subscore is 6.   Skin: Skin is warm. Capillary refill takes 2 to 3 seconds. No rash noted. No erythema. No pallor.   Psychiatric: She has a normal mood and affect.         ED Course   Procedures  Labs Reviewed   COMPREHENSIVE METABOLIC PANEL - Abnormal; Notable for the following components:       Result Value    Glucose Level 124 (*)     Blood Urea Nitrogen 6.0 (*)     All other components within normal limits   CBC WITH DIFFERENTIAL - Abnormal; Notable for the following components:    WBC 3.05 (*)     All other components within normal limits   TROPONIN I - Normal   CBC W/ AUTO DIFFERENTIAL    Narrative:     The following orders were created for panel order CBC auto differential.  Procedure                               Abnormality         Status                     ---------                               -----------         ------                     CBC with Differential[[id number]]       Abnormal            Final result                 Please view results for these tests on the individual orders.   EXTRA TUBES    Narrative:     The following orders were created for panel order EXTRA TUBES.  Procedure                               Abnormality         Status                     ---------                               -----------         ------                     Light Blue Top Hold[[id number]]                             In process                 Red Top Hold[[id number]]                                    In process                   Please view results for these tests on the individual orders.   LIGHT BLUE TOP HOLD   RED TOP HOLD     EKG Readings: (Independently Interpreted)   Rhythm: Sinus Tachycardia.  Ectopy: No Ectopy. Conduction: Normal. ST Segments: Normal ST Segments. T Waves: Normal. Clinical Impression: Normal Sinus Rhythm       Imaging Results    None          Medications   0.9%  NaCl infusion (has no administration in time range)   ondansetron injection 4 mg (has no administration in time range)     Medical Decision Making  This is a 23-year-old female with a history of COVID-19 who presented with persistent tachycardia and leukopenia.    Patient's heart rate accelerated greater than 35 beats per minute upon standing and she became significantly more symptomatic however her blood pressure remained stable.  Her symptoms consisted of worsening lightheadedness, nausea and abdominal pain.    Suspect that patient has inappropriate sinus tachycardia likely due to pots and COVID-19 however further differential includes dehydration, ectopic atrial tachycardia, low suspicion for MI, pulmonary embolism    Will obtain CBC, CMP EKG and give 1 L NS    10:47 PM patient eloped from ER    Amount and/or Complexity of Data Reviewed  External Data Reviewed: labs, radiology and ECG.  Labs: ordered.  ECG/medicine tests: ordered and independent interpretation performed.    Risk  Prescription drug management.                                      Clinical Impression:  Final diagnoses:  [R00.0] Tachycardia (Primary)          ED Disposition Condition    Eloped                 Faustino Lock MD  Resident  01/10/24 [id number]

## 2024-01-12 LAB
ALBUMIN SERPL-MCNC: 3.3 G/DL (ref 3.5–5)
ALBUMIN/GLOB SERPL: 1.4 RATIO (ref 1.1–2)
ALP SERPL-CCNC: 41 UNIT/L (ref 40–150)
ALT SERPL-CCNC: 8 UNIT/L (ref 0–55)
AST SERPL-CCNC: 12 UNIT/L (ref 5–34)
BASOPHILS # BLD AUTO: 0 X10(3)/MCL
BASOPHILS NFR BLD AUTO: 0 %
BILIRUB SERPL-MCNC: 0.4 MG/DL
BUN SERPL-MCNC: 5.5 MG/DL (ref 7–18.7)
CALCIUM SERPL-MCNC: 7.9 MG/DL (ref 8.4–10.2)
CHLORIDE SERPL-SCNC: 112 MMOL/L (ref 98–107)
CO2 SERPL-SCNC: 22 MMOL/L (ref 22–29)
CREAT SERPL-MCNC: 0.62 MG/DL (ref 0.55–1.02)
EOSINOPHIL # BLD AUTO: 0 X10(3)/MCL (ref 0–0.9)
EOSINOPHIL NFR BLD AUTO: 0 %
ERYTHROCYTE [DISTWIDTH] IN BLOOD BY AUTOMATED COUNT: 12.9 % (ref 11.5–17)
GFR SERPLBLD CREATININE-BSD FMLA CKD-EPI: >60 MLS/MIN/1.73/M2
GLOBULIN SER-MCNC: 2.4 GM/DL (ref 2.4–3.5)
GLUCOSE SERPL-MCNC: 120 MG/DL (ref 74–100)
HCT VFR BLD AUTO: 32.1 % (ref 37–47)
HGB BLD-MCNC: 10.9 G/DL (ref 12–16)
IMM GRANULOCYTES # BLD AUTO: 0.01 X10(3)/MCL (ref 0–0.04)
IMM GRANULOCYTES NFR BLD AUTO: 0.2 %
LACTATE SERPL-SCNC: 0.5 MMOL/L (ref 0.5–2.2)
LYMPHOCYTES # BLD AUTO: 0.62 X10(3)/MCL (ref 0.6–4.6)
LYMPHOCYTES NFR BLD AUTO: 12.2 %
MCH RBC QN AUTO: 29.9 PG (ref 27–31)
MCHC RBC AUTO-ENTMCNC: 34 G/DL (ref 33–36)
MCV RBC AUTO: 87.9 FL (ref 80–94)
MONOCYTES # BLD AUTO: 0.22 X10(3)/MCL (ref 0.1–1.3)
MONOCYTES NFR BLD AUTO: 4.3 %
NEUTROPHILS # BLD AUTO: 4.25 X10(3)/MCL (ref 2.1–9.2)
NEUTROPHILS NFR BLD AUTO: 83.3 %
NRBC BLD AUTO-RTO: 0 %
PLATELET # BLD AUTO: 131 X10(3)/MCL (ref 130–400)
PMV BLD AUTO: 10.3 FL (ref 7.4–10.4)
POTASSIUM SERPL-SCNC: 4.1 MMOL/L (ref 3.5–5.1)
PROT SERPL-MCNC: 5.7 GM/DL (ref 6.4–8.3)
RBC # BLD AUTO: 3.65 X10(6)/MCL (ref 4.2–5.4)
SODIUM SERPL-SCNC: 139 MMOL/L (ref 136–145)
WBC # SPEC AUTO: 5.1 X10(3)/MCL (ref 4.5–11.5)

## 2024-01-12 PROCEDURE — 80053 COMPREHEN METABOLIC PANEL: CPT

## 2024-01-12 PROCEDURE — 25000003 PHARM REV CODE 250

## 2024-01-12 PROCEDURE — 63600175 PHARM REV CODE 636 W HCPCS

## 2024-01-12 PROCEDURE — 96361 HYDRATE IV INFUSION ADD-ON: CPT

## 2024-01-12 PROCEDURE — G0378 HOSPITAL OBSERVATION PER HR: HCPCS

## 2024-01-12 PROCEDURE — 94761 N-INVAS EAR/PLS OXIMETRY MLT: CPT

## 2024-01-12 PROCEDURE — 85025 COMPLETE CBC W/AUTO DIFF WBC: CPT

## 2024-01-12 PROCEDURE — 83605 ASSAY OF LACTIC ACID: CPT | Performed by: FAMILY MEDICINE

## 2024-01-12 RX ORDER — IBUPROFEN 200 MG
1 TABLET ORAL DAILY PRN
Status: DISCONTINUED | OUTPATIENT
Start: 2024-01-12 | End: 2024-01-13 | Stop reason: HOSPADM

## 2024-01-12 RX ORDER — SIMETHICONE 80 MG
1 TABLET,CHEWABLE ORAL 3 TIMES DAILY PRN
Status: DISCONTINUED | OUTPATIENT
Start: 2024-01-12 | End: 2024-01-13 | Stop reason: HOSPADM

## 2024-01-12 RX ORDER — SODIUM CHLORIDE, SODIUM LACTATE, POTASSIUM CHLORIDE, CALCIUM CHLORIDE 600; 310; 30; 20 MG/100ML; MG/100ML; MG/100ML; MG/100ML
INJECTION, SOLUTION INTRAVENOUS CONTINUOUS
Status: DISCONTINUED | OUTPATIENT
Start: 2024-01-12 | End: 2024-01-13

## 2024-01-12 RX ORDER — CETIRIZINE HYDROCHLORIDE 10 MG/1
10 TABLET ORAL DAILY PRN
Status: DISCONTINUED | OUTPATIENT
Start: 2024-01-12 | End: 2024-01-13 | Stop reason: HOSPADM

## 2024-01-12 RX ORDER — ONDANSETRON 4 MG/1
4 TABLET, ORALLY DISINTEGRATING ORAL EVERY 8 HOURS PRN
Status: DISCONTINUED | OUTPATIENT
Start: 2024-01-12 | End: 2024-01-13 | Stop reason: HOSPADM

## 2024-01-12 RX ORDER — HYDRALAZINE HYDROCHLORIDE 20 MG/ML
10 INJECTION INTRAMUSCULAR; INTRAVENOUS
Status: DISCONTINUED | OUTPATIENT
Start: 2024-01-12 | End: 2024-01-13 | Stop reason: HOSPADM

## 2024-01-12 RX ORDER — PROMETHAZINE HYDROCHLORIDE 25 MG/1
25 TABLET ORAL EVERY 6 HOURS PRN
Status: DISCONTINUED | OUTPATIENT
Start: 2024-01-12 | End: 2024-01-13 | Stop reason: HOSPADM

## 2024-01-12 RX ORDER — LABETALOL HCL 20 MG/4 ML
10 SYRINGE (ML) INTRAVENOUS EVERY 4 HOURS PRN
Status: DISCONTINUED | OUTPATIENT
Start: 2024-01-12 | End: 2024-01-13 | Stop reason: HOSPADM

## 2024-01-12 RX ADMIN — DOXYCYCLINE HYCLATE 100 MG: 100 TABLET, FILM COATED ORAL at 08:01

## 2024-01-12 RX ADMIN — SODIUM CHLORIDE, POTASSIUM CHLORIDE, SODIUM LACTATE AND CALCIUM CHLORIDE: 600; 310; 30; 20 INJECTION, SOLUTION INTRAVENOUS at 03:01

## 2024-01-12 NOTE — H&P
[facility] Medicine Wards History & Physical Note     Resident Team: [facility] Medicine List 3  Attending Physician: Linwood Gordillo MD      Date of Admit: 2024    Chief Complaint     Fatigue (Pt presents to ed with reports of weakness, lethargy x1 week after testing positive for covid. Pt denies n/v but does report diarrhea. Pt is tachycardic in 140's at present. )      Subjective:      History of Present Illness:  Robyn Guerrero is a 23 y.o. female with a history of spontaneous , anxiety, recent COVID who presented to ED on 2024  with a primary complaint of tremors.  Patient states that this afternoon she began to feel a tightness in the right side of her chest that began to radiate to the left side of her chest at eventually involved her jaw.  States that she had difficulty moving her jaw.  Over time this eventually involved her bilateral upper and lower extremities, states that it felt like her body was locking up describes it as a cramping feeling.  Patient was discharged from hospital yesterday where she was admitted for sinus tachycardia and COVID.  During that hospitalization echo was obtained which revealed no abnormal findings.  The tachycardia improved throughout hospitalization with fluids although patient continues to endorse palpitations since being discharged.  She was discharged with Compazine and doxycycline (for a pilonidal cyst).  She took 3 doses of Compazine as prescribed, the last dose around 4:00 p.m..  Patient states she has not been able to eat today due to decreased appetite, states she drank 3-4 bottles of water today.  Additionally, patient endorses diarrhea, states she has approximately 3 episodes per day for the past 2 days.  Denies any headaches, dizziness, shortness for breath, abdominal pain, lower extremity edema.    In the ED, /72, pulse 145, respirations 16, oxygen saturation 98% on room air, temperature 98° F. EKG revealed sinus tachycardia.  Labs:  No  leukocytosis, bicarb 18, glucose 236, lactate 3.2.  Patient was given Ativan and Benadryl in the ED which resolved her muscle spasms.  She was given NS 3 L bolus.  Internal medicine consulted for admission.      Past Medical History:  Past Medical History:   Diagnosis Date    Anxiety disorder, unspecified     Irregular uterine contractions        Past Surgical History:  Past Surgical History:   Procedure Laterality Date    TONSILECTOMY      TYMPANOSTOMY TUBE PLACEMENT         Family History:  Family History   Problem Relation Age of Onset    Lung cancer Maternal Aunt     Uterine cancer Maternal Aunt     Breast cancer Neg Hx     Colon cancer Neg Hx     Ovarian cancer Neg Hx     Cervical cancer Neg Hx        Social History:  Social History     Tobacco Use    Smoking status: Never     Passive exposure: Never    Smokeless tobacco: Never   Substance Use Topics    Alcohol use: Not Currently    Drug use: Never     Comment: CBD use       Allergies:  Review of patient's allergies indicates:   Allergen Reactions    House dust Hives, Itching, Nausea And Vomiting and Shortness Of Breath       Home Medications:  Prior to Admission medications    Medication Sig Start Date End Date Taking? Authorizing Provider   doxycycline (VIBRA-TABS) 100 MG tablet Take 1 tablet (100 mg total) by mouth every 12 (twelve) hours. for 15 doses 1/10/24 1/18/24  Tejas Gaston DO   prochlorperazine (COMPAZINE) 10 MG tablet Take 1 tablet (10 mg total) by mouth 3 (three) times daily as needed (nausea, vomiting). 1/10/24 2/9/24  Tejas Gaston DO         Review of Systems:  Constitutional:  Generalized weakness, subjective fever  EENT: no sore throat, ear pain, sinus pain/congestion, nasal congestion/drainage  Respiratory:  Positive for cough productive of clear sputum, improved since recent hospitalization, no shortness for breath  Cardiovascular:  Positive for chest tightness and palpitations, no edema  Gastrointestinal:  Positive for nausea diarrhea, no  "abdominal pain  Genitourinary: no dysuria, no urinary frequency or urgency, no hematuria  Musculoskeletal:  Positive for "cramping feeling" in bilateral upper and lower extremities.  Difficulties with moving her jaw.  Integumentary: no skin rash or abnormal lesion  Neurologic: no headache, no dizziness           Objective:   Last 24 Hour Vital Signs:  BP  Min: 102/72  Max: 102/72  Temp  Av °F (36.7 °C)  Min: 97.9 °F (36.6 °C)  Max: 98 °F (36.7 °C)  Pulse  Av  Min: 145  Max: 145  Resp  Av  Min: 16  Max: 16  SpO2  Av %  Min: 98 %  Max: 98 %  Height  Av' 3" (160 cm)  Min: 5' 3" (160 cm)  Max: 5' 3" (160 cm)  Weight  Av.3 kg (113 lb)  Min: 51.3 kg (113 lb)  Max: 51.3 kg (113 lb)  Body mass index is 20.02 kg/m².  No intake/output data recorded.    Physical Examination:  General: well-developed, no acute distress  Eye: clear conjunctiva, eyelids normal  Head: normocephalic and atraumatic  Neck: full range of motion, supple  Respiratory: clear to auscultation bilaterally without wheezes, rales, rhonchi  Cardiovascular:  Tachycardic, regular rhythm without murmurs. No JVD.   Gastrointestinal: soft, non-tender, non-distended with normal bowel sounds in all four quadrants. No masses palpated  Musculoskeletal: full range of motion of all extremities without limitation or discomfort  Integumentary: no rashes or skin lesions present, no erythema  Neurologic: AAO x 3  Genitourinary:  Mild erythema in left groin, no drainage noted.  Psychiatric: cooperative with exam, good eye contact.      Laboratory:  Most Recent Data:  CBC:   Lab Results   Component Value Date    WBC 11.16 2024    HGB 14.0 2024    HCT 40.8 2024     2024    MCV 87.2 2024    RDW 13.0 2024     WBC Differential:   Recent Labs   Lab 24  0318 24  0642 01/10/24  0334 01/10/24  2120 01/11/24  2055   WBC 1.88* 2.01* 1.97* 3.05* 11.16   HGB 10.4* 10.9* 11.4* 13.3 14.0   HCT 31.6* 32.2* " "33.6* 39.2 40.8   * 108* 116* 142 245   MCV 89.8 89.7 89.1 87.5 87.2     BMP:   Lab Results   Component Value Date     01/11/2024    K 3.8 01/11/2024    CO2 18 (L) 01/11/2024    BUN 7.9 01/11/2024    CREATININE 0.74 01/11/2024    CALCIUM 9.5 01/11/2024    MG 1.50 (L) 01/10/2024    PHOS 2.5 01/09/2024     LFTs:   Lab Results   Component Value Date    ALBUMIN 4.4 01/11/2024    BILITOT 0.7 01/11/2024    AST 18 01/11/2024    ALKPHOS 54 01/11/2024    ALT 11 01/11/2024     Coags:   Lab Results   Component Value Date    INR 1.2 01/09/2024    PROTIME 15.7 (H) 01/09/2024    PTT 40.1 (H) 01/09/2024     FLP: No results found for: "CHOL", "HDL", "LDLCALC", "TRIG", "CHOLHDL"  DM:   Lab Results   Component Value Date    HGBA1C 4.7 01/11/2024    HGBA1C 4.7 01/08/2024    CREATININE 0.74 01/11/2024     Thyroid:   Lab Results   Component Value Date    TSH 0.892 01/08/2024      Anemia:   Lab Results   Component Value Date    IRON 24 (L) 01/09/2024    TIBC 207 (L) 01/09/2024    FERRITIN 80.25 01/08/2024       Lab Results   Component Value Date    [id number] 280 01/09/2024       Lab Results   Component Value Date    FOLATE 9.9 01/09/2024        Cardiac:   Lab Results   Component Value Date    TROPONINI 0.010 01/10/2024    BNP <10.0 06/19/2023     Urinalysis:   Lab Results   Component Value Date    LABURIN Final report 03/23/2023    COLORU Yellow 01/24/2023    PHUA 6.5 01/08/2024    CLARITYU Clear 06/16/2021    SPECGRAV Greater than 1.030 06/16/2021    NITRITE Negative 01/24/2023    KETONESU 2+ 06/16/2021    UROBILINOGEN Normal 01/08/2024    WBCUA 0-5 01/08/2024       Trended Lab Data:  Recent Labs   Lab 01/10/24  0334 01/10/24  2120 01/11/24  2054 01/11/24  2055   WBC 1.97* 3.05*  --  11.16   HGB 11.4* 13.3  --  14.0   HCT 33.6* 39.2  --  40.8   * 142  --  245   MCV 89.1 87.5  --  87.2   RDW 13.1 12.8  --  13.0    137 136  --    K 3.9 4.1 3.8  --    CO2 23 22 18*  --    BUN 4.8* 6.0* 7.9  --    CREATININE 0.56 " 0.59 0.74  --    ALBUMIN 3.3* 4.2 4.4  --    BILITOT 0.5 0.5 0.7  --    AST 14 17 18  --    ALKPHOS 41 54 54  --    ALT 9 12 11  --        Trended Cardiac Data:  Recent Labs   Lab 01/10/24  2120   TROPONINI 0.010       Microbiology Data:  Microbiology Results (last 7 days)       Procedure Component Value Units Date/Time    Blood culture x two cultures. Draw prior to antibiotics. [[id number]] Collected: 01/11/24 2222    Order Status: Sent Specimen: Blood from Arm, Left Updated: 01/11/24 2235    Blood culture x two cultures. Draw prior to antibiotics. [[id number]] Collected: 01/11/24 2105    Order Status: Sent Specimen: Blood from Arm, Left Updated: 01/11/24 2234             Other Results:  EKG (my interpretation):  Sinus tachycardia    Radiology:  Imaging Results              X-Ray Chest AP Portable (Final result)  Result time 01/11/24 21:28:57      Final result by Uriah Robertson MD (01/11/24 21:28:57)                   Impression:      No acute cardiopulmonary process      Electronically signed by: Uriah Robertson MD  Date:    01/11/2024  Time:    21:28               Narrative:    EXAMINATION:  Chest one view    CLINICAL HISTORY:  Sepsis    COMPARISON:  None    FINDINGS:  Cardiac silhouette is normal size.  Central vessels are within normal limits.  No confluent airspace disease.  No visible pneumothorax or pleural effusion.  No acute osseous abnormality                                         Assessment & Plan:     Sinus Tachycardia possibly 2/2 dehydration vs COVID-19  Concern for Postural tachycardia syndrome  -Patient has had recent poor oral intake, likely dehydrated.   -upon arrival, patient tachycardic with heart rate at 145.  -EKG revealed sinus tachycardia.  -patient began experiencing palpitations following her pregnancy 10 months ago.  -in the ED yesterday, patient's heart rate increased >35 beats/minute upon standing.  -echo on 01/09/2024 revealed no abnormal findings.  -initial lactate elevated at  3.2, repeat lactate 1.2.  -patient was given 3 L NS bolus in the ED.  -pending blood cultures.  -Continue  mL/hr.     Acute Dystonic reaction  -patient discharged with Compazine yesterday, took 3 doses today.  Symptoms began approximately 1-2 hours following the last dose.  -possible that muscle spasms may be secondary to acute dystonia from recent Compazine.  -patient was given Ativan and Benadryl in the ED with resolution of symptoms.  -Will continue to monitor for recurrence of symptoms.     Left groin abscess  Pilonidal cyst  -patient reports recurrent abscess of left groin, notes drainage prior to last admission.  No current drainage.  -patient was discharged with doxycycline 100 mg b.i.d., will continue.    Anxiety  -patient does not take any medications at home.  -consider starting anti-anxiety medication.      CODE STATUS:  Full  Access:  PIV  Antibiotics:  Doxycycline  Diet:  Adult regular  DVT Prophylaxis:  Lovenox  GI Prophylaxis:  None  Fluids:  mL/hr      Disposition:  Patient admitted for sinus tachycardia and muscle spasms possibly due to acute dystonia in the setting of recent Compazine use.          Oniel Turcios MD  [facility] PGY-1

## 2024-01-12 NOTE — ED PROVIDER NOTES
Encounter Date: 2024       History     Chief Complaint   Patient presents with    Fatigue     Pt presents to ed with reports of weakness, lethargy x1 week after testing positive for covid. Pt denies n/v but does report diarrhea. Pt is tachycardic in 140's at present.      HPI  This patient is a 23-year-old female with past medical history of prior spontaneous , anxiety, recent COVID-19 infection who presented to the ER with complaints of chest pain, lightheadedness, nausea, abdominal pain and tachycardia.  She was recently admitted to the hospital for roughly 2 days due to tachycardia that was persistent and fever.  Her hospital stay demonstrated that she had a normal echocardiogram, her tachycardia improved with 2 L of fluids, she did have poor response to metoprolol for the tachycardia with low blood pressure.  She was discharged with cardiology follow-up.  After discharge she states that she was told if she were to develop chest pain again to present back to the ER.  Yesterday she presented to the ER and she stated that for the roughly the past 3 hours she has had chest pressure and tightness.  She was lightheaded when she stands up.     Today she presented back to the ER because she was having whole-body tremors and she felt like she could not move her jaw very well as well as her arm.  She recently started taking her prescription of prochlorperazine and her last dose was at 4:00 p.m..  She states she has never had abnormal reaction to this medication before but this is the 1st time that she has been given it.  She reports that she has had a fever today but she did not measure it.  She also continues to have a cough post COVID.  She denies any chest pain or shortness a breath but she does feel like she just has difficulty moving her body.  She states that she did not eat or drink very much throughout the day today and she slept most of the day prior to taking the medication.      Review of patient's  allergies indicates:   Allergen Reactions    House dust Hives, Itching, Nausea And Vomiting and Shortness Of Breath     Past Medical History:   Diagnosis Date    Anxiety disorder, unspecified     Irregular uterine contractions      Past Surgical History:   Procedure Laterality Date    TONSILECTOMY      TYMPANOSTOMY TUBE PLACEMENT       Family History   Problem Relation Age of Onset    Lung cancer Maternal Aunt     Uterine cancer Maternal Aunt     Breast cancer Neg Hx     Colon cancer Neg Hx     Ovarian cancer Neg Hx     Cervical cancer Neg Hx      Social History     Tobacco Use    Smoking status: Never     Passive exposure: Never    Smokeless tobacco: Never   Substance Use Topics    Alcohol use: Not Currently    Drug use: Never     Comment: CBD use     Review of Systems  Constitutional: no fever, fatigue, weakness  Eye: no vision loss, eye redness, drainage, or pain  ENMT: no sore throat, ear pain, sinus pain/congestion, nasal congestion/drainage  Respiratory: no cough, no wheezing, no shortness of breath  Cardiovascular: no chest pain, + palpitations, no edema  Gastrointestinal: no nausea, vomiting, or diarrhea. No abdominal pain  Genitourinary: no dysuria, no urinary frequency or urgency, no hematuria  Hema/Lymph: no abnormal bruising or bleeding  Endocrine: no heat or cold intolerance, no excessive thirst or excessive urination  Musculoskeletal: + muscle or joint pain, no joint swelling  Integumentary: no skin rash or abnormal lesion  Neurologic: no headache, no dizziness, no weakness or numbness   Physical Exam     Initial Vitals [01/11/24 2033]   BP Pulse Resp Temp SpO2   102/72 (!) 145 16 98 °F (36.7 °C) 98 %      MAP       --         Physical Exam    Nursing note and vitals reviewed.  Constitutional: She appears well-developed and well-nourished. She is diaphoretic. No distress.   HENT:   Head: Normocephalic and atraumatic.   Mouth/Throat: No oropharyngeal exudate.   Eyes: Conjunctivae and EOM are normal.  Pupils are equal, round, and reactive to light. Right eye exhibits no discharge. Left eye exhibits no discharge. No scleral icterus.   Neck: Neck supple. No thyromegaly present. No tracheal deviation present. No JVD present.   Normal range of motion.  Cardiovascular:  Regular rhythm and normal heart sounds.     Exam reveals no gallop and no friction rub.       No murmur heard.  Pulmonary/Chest: Breath sounds normal. No stridor. No respiratory distress. She has no wheezes. She has no rhonchi. She has no rales.   Abdominal: Abdomen is soft. She exhibits no distension. There is no abdominal tenderness. There is no rebound and no guarding.   Musculoskeletal:         General: No tenderness or edema. Normal range of motion.      Cervical back: Normal range of motion and neck supple.     Neurological: She is alert and oriented to person, place, and time. She has normal strength. No cranial nerve deficit or sensory deficit. GCS score is 15. GCS eye subscore is 4. GCS verbal subscore is 5. GCS motor subscore is 6.   Appears to have clonus on exam, however significantly tremulous throughout with some rigidity.   Skin: Skin is warm. Capillary refill takes 2 to 3 seconds. No rash noted. No erythema. No pallor.   Psychiatric: She has a normal mood and affect.         ED Course   Procedures  Labs Reviewed   COMPREHENSIVE METABOLIC PANEL - Abnormal; Notable for the following components:       Result Value    Carbon Dioxide 18 (*)     Glucose Level 236 (*)     All other components within normal limits   LACTIC ACID, PLASMA - Abnormal; Notable for the following components:    Lactic Acid Level 3.2 (*)     All other components within normal limits   CBC WITH DIFFERENTIAL - Abnormal; Notable for the following components:    MPV 10.7 (*)     All other components within normal limits   BLOOD CULTURE [facility]   BLOOD CULTURE [facility]   CBC W/ AUTO DIFFERENTIAL    Narrative:     The following orders were created for panel order CBC auto  differential.  Procedure                               Abnormality         Status                     ---------                               -----------         ------                     CBC with Differential[[id number]]       Abnormal            Final result                 Please view results for these tests on the individual orders.   URINALYSIS, REFLEX TO URINE CULTURE   CK   EXTRA TUBES    Narrative:     The following orders were created for panel order EXTRA TUBES.  Procedure                               Abnormality         Status                     ---------                               -----------         ------                     Light Blue Top Hold[[id number]]                             In process                 Light Green Top Hold[[id number]]                            In process                 Lavender Top Hold[[id number]]                               In process                 Gold Top Hold[[id number]]                                   In process                 Pink Top Hold[[id number]]                                   In process                   Please view results for these tests on the individual orders.   LIGHT BLUE TOP HOLD   LIGHT GREEN TOP HOLD   LAVENDER TOP HOLD   GOLD TOP HOLD   PINK TOP HOLD   LACTIC ACID, PLASMA   LACTIC ACID, PLASMA     EKG Readings: (Independently Interpreted)   Rhythm: Sinus Tachycardia. Ectopy: No Ectopy. Conduction: Normal. ST Segments: Normal ST Segments. T Waves: Normal. Clinical Impression: Normal Sinus Rhythm       Imaging Results              X-Ray Chest AP Portable (Final result)  Result time 01/11/24 21:28:57      Final result by Uriah Robertson MD (01/11/24 21:28:57)                   Impression:      No acute cardiopulmonary process      Electronically signed by: Uriah Robertson MD  Date:    01/11/2024  Time:    21:28               Narrative:    EXAMINATION:  Chest one view    CLINICAL HISTORY:  Sepsis    COMPARISON:  None    FINDINGS:  Cardiac  silhouette is normal size.  Central vessels are within normal limits.  No confluent airspace disease.  No visible pneumothorax or pleural effusion.  No acute osseous abnormality                                       Medications   sodium chloride 0.9% bolus 3,000 mL 3,000 mL (3,000 mLs Intravenous New Bag 1/11/24 2106)   LORazepam injection 1 mg (1 mg Intravenous Given 1/11/24 2117)   diphenhydrAMINE injection 50 mg (50 mg Intravenous Given 1/11/24 2116)     Medical Decision Making  This is a 23-year-old female with a history of COVID-19 who presented yesterday with persistent tachycardia and leukopenia but then eloped and returned to the emergency room with continued tachycardia, increased fatigue and tremors.    Patient's heart rate accelerated greater than 35 beats per minute upon standing and she became significantly more symptomatic however her blood pressure remained stable.  Her symptoms consisted of worsening lightheadedness, nausea and abdominal pain.    Suspect that patient has inappropriate sinus tachycardia likely due to pots and COVID-19 however further differential includes dehydration, sepsis, ectopic atrial tachycardia, anxiety, extrapyramidal symptoms, low suspicion for MI, pulmonary embolism    Will obtain CBC, CMP, lactate, blood cultures, chest x-ray, EKG and give 3 L NS, 50 mg of Benadryl and 1 mg of Ativan    9:00 PM patient re-evaluated following Ativan and Benadryl, reports that she has had some significant improvement in her anxiety and tremulousness however her heart rate continues to fluctuate significantly from the 130s to 160s.    10:13 PM labs demonstrate mild lactic acidosis, leukopenia has resolved and she has high normal white cell count at this time, chest x-ray unremarkable, CMP consistent with a mild lactic acidosis without any other significant abnormality.  Suspect patient has been dehydrated from lack of fluid intake throughout the day, she may have had an abnormal reaction to  prochlorperazine, we will admit the patient for further monitoring.  Of note she now has mildly elevated glucose, urinalysis is pending. Discussed with Internal Medicine.        Amount and/or Complexity of Data Reviewed  External Data Reviewed: labs, radiology and ECG.  Labs: ordered.  Radiology: ordered.  ECG/medicine tests: ordered and independent interpretation performed.    Risk  Prescription drug management.  Decision regarding hospitalization.                                      Clinical Impression:  Final diagnoses:  [R00.0] Tachycardia  [F41.9] Anxiety  [E87.20] Lactic acidosis (Primary)  [U07.1] COVID  [E86.0] Dehydration          ED Disposition Condition    Admit Stable                Faustino Lock MD  Resident  01/10/24 7928       Faustino Lock MD  Resident  01/11/24 1207       Faustino Lock MD  Resident  01/11/24 5347

## 2024-01-13 VITALS
TEMPERATURE: 98 F | WEIGHT: 113 LBS | BODY MASS INDEX: 20.02 KG/M2 | HEIGHT: 63 IN | OXYGEN SATURATION: 99 % | RESPIRATION RATE: 18 BRPM | HEART RATE: 119 BPM | DIASTOLIC BLOOD PRESSURE: 83 MMHG | SYSTOLIC BLOOD PRESSURE: 130 MMHG

## 2024-01-13 LAB
ALBUMIN SERPL-MCNC: 3.4 G/DL (ref 3.5–5)
ALBUMIN/GLOB SERPL: 1.4 RATIO (ref 1.1–2)
ALP SERPL-CCNC: 42 UNIT/L (ref 40–150)
ALT SERPL-CCNC: 8 UNIT/L (ref 0–55)
AST SERPL-CCNC: 12 UNIT/L (ref 5–34)
BACTERIA BLD CULT: NORMAL
BACTERIA BLD CULT: NORMAL
BASOPHILS # BLD AUTO: 0.01 X10(3)/MCL
BASOPHILS NFR BLD AUTO: 0.4 %
BILIRUB SERPL-MCNC: 0.5 MG/DL
BUN SERPL-MCNC: 3.6 MG/DL (ref 7–18.7)
CALCIUM SERPL-MCNC: 8.4 MG/DL (ref 8.4–10.2)
CHLORIDE SERPL-SCNC: 106 MMOL/L (ref 98–107)
CO2 SERPL-SCNC: 26 MMOL/L (ref 22–29)
CORTIS SERPL-SCNC: 20.5 UG/DL
CREAT SERPL-MCNC: 0.59 MG/DL (ref 0.55–1.02)
EOSINOPHIL # BLD AUTO: 0.04 X10(3)/MCL (ref 0–0.9)
EOSINOPHIL NFR BLD AUTO: 1.5 %
ERYTHROCYTE [DISTWIDTH] IN BLOOD BY AUTOMATED COUNT: 12.9 % (ref 11.5–17)
GFR SERPLBLD CREATININE-BSD FMLA CKD-EPI: >60 MLS/MIN/1.73/M2
GLOBULIN SER-MCNC: 2.5 GM/DL (ref 2.4–3.5)
GLUCOSE SERPL-MCNC: 85 MG/DL (ref 74–100)
HCT VFR BLD AUTO: 33.7 % (ref 37–47)
HGB BLD-MCNC: 11.2 G/DL (ref 12–16)
HOLD SPECIMEN: NORMAL
HOLD SPECIMEN: NORMAL
IMM GRANULOCYTES # BLD AUTO: 0 X10(3)/MCL (ref 0–0.04)
IMM GRANULOCYTES NFR BLD AUTO: 0 %
LYMPHOCYTES # BLD AUTO: 1.36 X10(3)/MCL (ref 0.6–4.6)
LYMPHOCYTES NFR BLD AUTO: 52.1 %
MCH RBC QN AUTO: 29.4 PG (ref 27–31)
MCHC RBC AUTO-ENTMCNC: 33.2 G/DL (ref 33–36)
MCV RBC AUTO: 88.5 FL (ref 80–94)
MONOCYTES # BLD AUTO: 0.25 X10(3)/MCL (ref 0.1–1.3)
MONOCYTES NFR BLD AUTO: 9.6 %
NEUTROPHILS # BLD AUTO: 0.95 X10(3)/MCL (ref 2.1–9.2)
NEUTROPHILS NFR BLD AUTO: 36.4 %
NRBC BLD AUTO-RTO: 0 %
PLATELET # BLD AUTO: 139 X10(3)/MCL (ref 130–400)
PLATELETS.RETICULATED NFR BLD AUTO: 3.2 % (ref 0.9–11.2)
PMV BLD AUTO: 10.3 FL (ref 7.4–10.4)
POTASSIUM SERPL-SCNC: 3.8 MMOL/L (ref 3.5–5.1)
PROT SERPL-MCNC: 5.9 GM/DL (ref 6.4–8.3)
RBC # BLD AUTO: 3.81 X10(6)/MCL (ref 4.2–5.4)
SODIUM SERPL-SCNC: 141 MMOL/L (ref 136–145)
WBC # SPEC AUTO: 2.61 X10(3)/MCL (ref 4.5–11.5)

## 2024-01-13 PROCEDURE — 85025 COMPLETE CBC W/AUTO DIFF WBC: CPT

## 2024-01-13 PROCEDURE — 25000003 PHARM REV CODE 250

## 2024-01-13 PROCEDURE — G0378 HOSPITAL OBSERVATION PER HR: HCPCS

## 2024-01-13 PROCEDURE — 94761 N-INVAS EAR/PLS OXIMETRY MLT: CPT

## 2024-01-13 PROCEDURE — 82533 TOTAL CORTISOL: CPT

## 2024-01-13 PROCEDURE — 80053 COMPREHEN METABOLIC PANEL: CPT

## 2024-01-13 PROCEDURE — 96361 HYDRATE IV INFUSION ADD-ON: CPT

## 2024-01-13 RX ORDER — FAMOTIDINE 20 MG/1
20 TABLET, FILM COATED ORAL 2 TIMES DAILY
Qty: 60 TABLET | Refills: 11 | Status: SHIPPED | OUTPATIENT
Start: 2024-01-13 | End: 2024-01-13

## 2024-01-13 RX ORDER — ESCITALOPRAM OXALATE 5 MG/1
5 TABLET ORAL DAILY
Status: DISCONTINUED | OUTPATIENT
Start: 2024-01-13 | End: 2024-01-13 | Stop reason: HOSPADM

## 2024-01-13 RX ORDER — ESCITALOPRAM OXALATE 5 MG/1
5 TABLET ORAL DAILY
Qty: 30 TABLET | Refills: 11 | Status: SHIPPED | OUTPATIENT
Start: 2024-01-13 | End: 2024-01-13

## 2024-01-13 RX ORDER — ONDANSETRON 4 MG/1
4 TABLET, ORALLY DISINTEGRATING ORAL EVERY 8 HOURS PRN
Qty: 60 TABLET | Refills: 0 | Status: SHIPPED | OUTPATIENT
Start: 2024-01-13 | End: 2024-01-16

## 2024-01-13 RX ORDER — ESCITALOPRAM OXALATE 5 MG/1
5 TABLET ORAL DAILY
Qty: 30 TABLET | Refills: 11 | Status: SHIPPED | OUTPATIENT
Start: 2024-01-13 | End: 2024-02-01 | Stop reason: SDUPTHER

## 2024-01-13 RX ORDER — FAMOTIDINE 20 MG/1
20 TABLET, FILM COATED ORAL 2 TIMES DAILY
Qty: 60 TABLET | Refills: 11 | Status: SHIPPED | OUTPATIENT
Start: 2024-01-13 | End: 2024-02-01

## 2024-01-13 RX ORDER — FAMOTIDINE 20 MG/1
20 TABLET, FILM COATED ORAL 2 TIMES DAILY
Status: DISCONTINUED | OUTPATIENT
Start: 2024-01-13 | End: 2024-01-13 | Stop reason: HOSPADM

## 2024-01-13 RX ADMIN — DOXYCYCLINE HYCLATE 100 MG: 100 TABLET, FILM COATED ORAL at 08:01

## 2024-01-13 RX ADMIN — ESCITALOPRAM OXALATE 5 MG: 5 TABLET, FILM COATED ORAL at 08:01

## 2024-01-13 RX ADMIN — FAMOTIDINE 20 MG: 20 TABLET, FILM COATED ORAL at 09:01

## 2024-01-13 NOTE — DISCHARGE SUMMARY
[facility] Discharge Summary    Admitting Physician: Linwood Gordillo MD  Attending Physician: Linwood Gordillo MD  Date of Admit: 2024  Date of Discharge: 2024    Condition: Stable  Outcome: Patient tolerated treatment/procedure well without complication and is now ready for discharge.  DISPOSITION: Home or Self Care      Discharge Diagnoses     Patient Active Problem List   Diagnosis    Threatened     Anxiety disorder    Sinus tachycardia by electrocardiography    Gestational diabetes mellitus (GDM) in third trimester    Irregular uterine contractions    Anxiety during pregnancy    Vacuum extractor delivery, delivered    COVID-19    Tremor       Principal Problem:  Sinus tachycardia by electrocardiography    Consultants and Procedures     Consultants:  None    Procedures:   * No surgery found *     Brief Admission History      Robyn Guerrero is a 23 y.o. female with a history of spontaneous , anxiety, recent COVID who presented to ED on 2024  with a primary complaint of tremors.  Patient states that this afternoon she began to feel a tightness in the right side of her chest that began to radiate to the left side of her chest at eventually involved her jaw.  States that she had difficulty moving her jaw.  Over time this eventually involved her bilateral upper and lower extremities, states that it felt like her body was locking up describes it as a cramping feeling.  Patient was discharged from hospital yesterday where she was admitted for sinus tachycardia and COVID.  During that hospitalization echo was obtained which revealed no abnormal findings.  The tachycardia improved throughout hospitalization with fluids although patient continues to endorse palpitations since being discharged.  She was discharged with Compazine and doxycycline (for a pilonidal cyst).  She took 3 doses of Compazine as prescribed, the last dose around 4:00 p.m..  Patient states she has not been  "able to eat today due to decreased appetite, states she drank 3-4 bottles of water today.  Additionally, patient endorses diarrhea, states she has approximately 3 episodes per day for the past 2 days.  Denies any headaches, dizziness, shortness for breath, abdominal pain, lower extremity edema.     In the ED, /72, pulse 145, respirations 16, oxygen saturation 98% on room air, temperature 98° F. EKG revealed sinus tachycardia.  Labs:  No leukocytosis, bicarb 18, glucose 236, lactate 3.2.  Patient was given Ativan and Benadryl in the ED which resolved her muscle spasms.  She was given NS 3 L bolus.  Internal medicine consulted for admission.    Hospital Course with Pertinent Findings     Patient continued on IVF with improvement in heart rate. She was monitored closely for reoccurrence of muscle spasms and acute dystonia which remained stable.  Continued on Doxy for treatment of pilonidal cyst.  Discussed with patient treatment options for anxiety with recommendation for initiation of SSRI.  Decision was made to initiate lexapro therapy with psychiatry follow-up outpatient.  Cortisol level ordered for workup of possible adrenal insufficiency. Patient has a scheduled appt with cardiology 1/16/2024 and post-wards follow-up with internal medicine 1/22/2024.  She should continue outpatient follow-up and management.  May benefit from beta blocker therapy in the future although did not appear to tolerate metoprolol on original presentation to ED.  Will defer to cardiology. Given ED precautions to return if symptoms worsen.  Counseled on the importance of staying well hydrated.  Given prescription for zofran and pepcid to continue upon discharge as needed for nausea.      Discharge physical exam:  Vitals  BP: 108/73  Temp: 97.8 °F (36.6 °C)  Temp Source: Oral  Pulse: 87  Resp: 19  SpO2: 100 %  Height: 5' 3" (160 cm)  Weight: 51.3 kg (113 lb)    General: Patient resting comfortably in bed, in no acute distress   Eye: " PERRLA, EOMI, clear conjunctiva, eyelids normal  HENT: Head-normocephalic and atraumatic  Neck: full range of motion, no thyromegaly or lymphadenopathy, trachea midline, supple, no palpable thyroid nodules  Respiratory: clear to auscultation bilaterally without wheezes, rales, rhonchi  Cardiovascular: regular rate and rhythm without murmurs.  No gallops or rubs no JVD.  Capillary refill within normal limits.  Gastrointestinal: soft, non-tender, non-distended with normal bowel sounds, without masses to palpation  Genitourinary: no CVA tenderness to palpation  Musculoskeletal: full range of motion of all extremities/spine without limitation or discomfort  Integumentary: no rashes or skin lesions present  Neurologic: no signs of peripheral neurological deficit, motor/sensory function intact  Psychiatric:  alert and oriented, cognitive function intact, cooperative with exam, good eye contact, judgement and insight intact, mood and affect full range.     TIME SPENT ON DISCHARGE: 60 minutes    Discharge Medications        Medication List        START taking these medications      EScitalopram oxalate 5 MG Tab  Commonly known as: LEXAPRO  Take 1 tablet (5 mg total) by mouth once daily.     famotidine 20 MG tablet  Commonly known as: PEPCID  Take 1 tablet (20 mg total) by mouth 2 (two) times daily.     ondansetron 4 MG Tbdl  Commonly known as: ZOFRAN-ODT  Take 1 tablet (4 mg total) by mouth every 8 (eight) hours as needed.            CONTINUE taking these medications      doxycycline 100 MG tablet  Commonly known as: VIBRA-TABS  Take 1 tablet (100 mg total) by mouth every 12 (twelve) hours. for 15 doses            STOP taking these medications      prochlorperazine 10 MG tablet  Commonly known as: COMPAZINE               Where to Get Your Medications        These medications were sent to [id number] - [place], [place] - [address] AT [facility] [address]  [address],  [place] [address]      Phone: [phone]   EScitalopram oxalate 5 MG Tab  famotidine 20 MG tablet  ondansetron 4 MG Tbdl         Discharge Information:     Discharge plan discussed with attending physician     Rosa Baldwin MD  [employer] Internal Medicine, PGY-2

## 2024-01-13 NOTE — PLAN OF CARE
Problem: Diabetes in Pregnancy  Goal: Blood Glucose Level Within Targeted Range  Outcome: Ongoing, Progressing

## 2024-01-16 ENCOUNTER — OFFICE VISIT (OUTPATIENT)
Dept: CARDIOLOGY | Facility: CLINIC | Age: 24
End: 2024-01-16
Payer: MEDICAID

## 2024-01-16 VITALS
RESPIRATION RATE: 18 BRPM | WEIGHT: 107 LBS | SYSTOLIC BLOOD PRESSURE: 92 MMHG | HEIGHT: 63 IN | DIASTOLIC BLOOD PRESSURE: 61 MMHG | TEMPERATURE: 98 F | OXYGEN SATURATION: 99 % | BODY MASS INDEX: 18.96 KG/M2 | HEART RATE: 107 BPM

## 2024-01-16 DIAGNOSIS — R00.0 SINUS TACHYCARDIA: ICD-10-CM

## 2024-01-16 PROCEDURE — 93005 ELECTROCARDIOGRAM TRACING: CPT

## 2024-01-16 PROCEDURE — 99214 OFFICE O/P EST MOD 30 MIN: CPT | Mod: PBBFAC | Performed by: INTERNAL MEDICINE

## 2024-01-16 NOTE — PATIENT INSTRUCTIONS
- increase salt intake  - increase exercise as tolerated   - perform countermaneuvers (squatting, hand , and crossing legs) to increase blood flow to heart      Follow up in 6 months or sooner if needed

## 2024-01-16 NOTE — PROGRESS NOTES
[facility]  OUTPATIENT OFFICE VISIT NOTE    SUBJECTIVE:      Chief Complaint: new pt referral from ED:  eval/tx tachycardia. (Pt states she started having tachycardia during pregnancy 10 months ago but got much worse after COVID last week but improved since.  In the past she states they told her POTS was cause of tachycardia. )       HPI: Robyn Guerrero is a 23 y.o. yo female w/ PMH of  has a past medical history of spontaneous , gestational diabetes, recent COVID, ABDULAZIZ/MAD, who is referred to [facility] for tachycardia.  Reports of fast heart rate with associated chest pain and nausea initially started about 10 months ago during her 3rd pregnancy.  States chest pain is a sharp pain with associated nausea the last about 5 minutes and resolves on its own.  Also reports of to passing out episodes within the last 2 weeks x2.  Reports of getting nauseous prior to these events but denies any chest pain. Previously prescribed metoprolol but did not tolerate 2/2 to hypotension. Recent hospitalization for tachycardia worsening in setting of COVID infection on 24 to 24.    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  Previous vape user.    Past Medical History:   has a past medical history of Anxiety disorder, unspecified and Irregular uterine contractions.     Past Surgical History:   has a past surgical history that includes TONSILECTOMY and Tympanostomy tube placement.     Family History:  family history includes Lung cancer in her maternal aunt; Uterine cancer in her maternal aunt.     Social History:   reports that she has never smoked. She has never been exposed to tobacco smoke. She has never used smokeless tobacco. She reports that she does not currently use alcohol. She reports that she does not use drugs.     Allergies:  is allergic to house dust.     Home Medications:  Current Outpatient Medications   Medication Instructions    EScitalopram oxalate  "(LEXAPRO) 5 mg, Oral, Daily    famotidine (PEPCID) 20 mg, Oral, 2 times daily        ROS:  Review of Systems   Respiratory:  Positive for shortness of breath.    Cardiovascular:  Positive for chest pain. Negative for leg swelling and PND.   Gastrointestinal:  Positive for nausea. Negative for abdominal pain, constipation and diarrhea.   Musculoskeletal:  Negative for joint pain and myalgias.   Skin:  Negative for itching and rash.   Neurological:  Positive for dizziness, loss of consciousness and weakness.            OBJECTIVE:     Vital signs:   BP 92/61 (BP Location: Left arm, BP Method: Medium (Automatic))   Pulse 107   Temp 98.1 °F (36.7 °C)   Resp 18   Ht 5' 2.99" (1.6 m)   Wt 48.5 kg (107 lb)   LMP 12/04/2023 (Approximate)   SpO2 99%   BMI 18.96 kg/m²      Physical Examination:  General: Well nourished w/o distress, thin   Neck: Full ROM; no lymphadenopathy  Pulm: CTA bilaterally, normal work of breathing  CV: S1, S2 w/o murmurs or gallops; no edema noted; tachycardic  GI: Soft with normal bowel sounds in all quadrants, no masses on palpation  MSK: Full ROM of all extremities and spine w/o limitation or discomfort  Derm: No rashes, abnormal bruising, or skin lesions  Neuro: AAOx4; CN II-XII intact; motor/sensory function intact      Labs:  CMP:   Recent Labs   Lab 04/21/21  0650 06/14/21  1350 06/19/23  2203 01/11/24  2054 01/12/24  0109 01/13/24  0523   Sodium Level 138 141   < > 136 139 141   Potassium Level 3.8 4.6   < > 3.8 4.1 3.8   Chloride 100 104   < > 104 112 H 106   Carbon Dioxide 25 27   < > 18 L 22 26   Blood Urea Nitrogen 15.0 4.3 L   < > 7.9 5.5 L 3.6 L   Creatinine 0.66 0.65   < > 0.74 0.62 0.59   Glucose Level 107 H 102 H   < > 236 H 120 H 85   Calcium Level Total 9.6 9.9   < > 9.5 7.9 L 8.4   Albumin Level 4.7 4.6   < > 4.4 3.3 L 3.4 L   Bilirubin Total 1.1 0.8   < > 0.7 0.4 0.5   Bilirubin Direct 0.4 0.4  --   --   --   --    Bilirubin Indirect 0.70 0.40  --   --   --   --  "   Aspartate Aminotransferase 65 H 15   < > 18 12 12   Alanine Aminotransferase 97 H 12   < > 11 8 8   Alkaline Phosphatase 77 72   < > 54 41 42    < > = values in this interval not displayed.     CBC:   Recent Labs   Lab 01/11/24 2055 01/12/24 0109 01/13/24  0523   WBC 11.16 5.10 2.61 L   Neut # 8.08 4.25 0.95 L   RBC 4.68 3.65 L 3.81 L   Hgb 14.0 10.9 L 11.2 L   Hct 40.8 32.1 L 33.7 L   Platelet 245 131 139   MCV 87.2 87.9 88.5   RDW 13.0 12.9 12.9     FLP:     DM:   Recent Labs   Lab 01/08/24  1651 01/09/24 0318 01/11/24 2054 01/11/24 2222 01/12/24 0109 01/13/24  0523   Hemoglobin A1c 4.7  --   --  4.7  --   --    Creatinine  --    < > 0.74  --  0.62 0.59    < > = values in this interval not displayed.     Thyroid:   Recent Labs   Lab 04/21/21  0650 06/19/23 2203 01/08/24  1012   TSH 0.8674 0.755 0.892     Anemia:   Recent Labs   Lab 01/08/24  1646 01/09/24 0318 01/09/24  0620 01/09/24  0642 01/13/24  0523   Hgb  --    < >  --    < > 11.2 L   Hct  --    < >  --    < > 33.7 L   Iron Level  --   --  24 L  --   --    Ferritin Level 80.25  --   --   --   --    Transferrin  --   --  184  --   --    Iron Binding Capacity Total  --   --  207 L  --   --    Vitamin B12 Level  --   --  280  --   --    Folate Level  --   --  9.9  --   --     < > = values in this interval not displayed.           ASSESSMENT & PLAN:        Likely Postural tachycardia Syndrome (POTS)  - EKG w/ persistent sinus tachycardia    - in clinic today 1/16/24 EKG w/ sinus rhythm rate of 91  - deferring heart monitor given pt has numerous EKGs with only sinus tachycardia present  - TTE on 1/9/24 - EF 63% without any other structural abnormalities  - orthostatic BP in clinic   - laying 116/75, HR 91   - sitting 106/72,     - standing 100/73,   - educated on: POTS diagnosis, increasing salt intake, wearing compression stockings, and countermaneuvers to raise BP when symptomatic   - also encouraged exercise as tolerated         Return to  clinic in 6 month(s).    Homar Oleary DO  [facility] Internal Medicine PGY-2    Orders Placed This Encounter    IN OFFICE EKG 12-LEAD (to [place])

## 2024-01-17 LAB
BACTERIA BLD CULT: NORMAL
BACTERIA BLD CULT: NORMAL

## 2024-01-17 NOTE — PROGRESS NOTES
Cardiology attending addendum  Patient was seen and examined with Dr. Homar Oleary. Agree with plan as outlined above.  Patient's clinical history and vital signs suggestive of POTS.  Extensively discussed the benign nature of the condition and encouraged her to do certain lifestyle changes including increasing fluid intake, liberalizing salt intake, performing counter pressure maneuvers unchanged in position, graded exercising and wearing compression stockings preferably up to the waist.  Follow-up in 6 months    Ana Parker MD  [facility]iology[facility]

## 2024-02-01 ENCOUNTER — OFFICE VISIT (OUTPATIENT)
Dept: INTERNAL MEDICINE | Facility: CLINIC | Age: 24
End: 2024-02-01
Payer: MEDICAID

## 2024-02-01 VITALS
HEIGHT: 62 IN | RESPIRATION RATE: 18 BRPM | DIASTOLIC BLOOD PRESSURE: 74 MMHG | TEMPERATURE: 98 F | BODY MASS INDEX: 20.61 KG/M2 | WEIGHT: 112 LBS | HEART RATE: 91 BPM | SYSTOLIC BLOOD PRESSURE: 108 MMHG

## 2024-02-01 DIAGNOSIS — Z86.32 HISTORY OF GESTATIONAL DIABETES: ICD-10-CM

## 2024-02-01 DIAGNOSIS — Z00.00 WELL ADULT EXAM: ICD-10-CM

## 2024-02-01 DIAGNOSIS — R79.89 ABNORMAL CBC: ICD-10-CM

## 2024-02-01 DIAGNOSIS — R00.0 SINUS TACHYCARDIA BY ELECTROCARDIOGRAPHY: ICD-10-CM

## 2024-02-01 DIAGNOSIS — F41.9 ANXIETY DISORDER, UNSPECIFIED TYPE: Primary | ICD-10-CM

## 2024-02-01 DIAGNOSIS — G90.A POTS (POSTURAL ORTHOSTATIC TACHYCARDIA SYNDROME): ICD-10-CM

## 2024-02-01 DIAGNOSIS — Z13.6 SCREENING FOR HYPERTENSION: ICD-10-CM

## 2024-02-01 PROBLEM — R25.1 TREMOR: Chronic | Status: ACTIVE | Noted: 2024-01-11

## 2024-02-01 PROCEDURE — 3044F HG A1C LEVEL LT 7.0%: CPT | Mod: CPTII,,, | Performed by: NURSE PRACTITIONER

## 2024-02-01 PROCEDURE — 99214 OFFICE O/P EST MOD 30 MIN: CPT | Mod: PBBFAC | Performed by: NURSE PRACTITIONER

## 2024-02-01 PROCEDURE — 3074F SYST BP LT 130 MM HG: CPT | Mod: CPTII,,, | Performed by: NURSE PRACTITIONER

## 2024-02-01 PROCEDURE — 1160F RVW MEDS BY RX/DR IN RCRD: CPT | Mod: CPTII,,, | Performed by: NURSE PRACTITIONER

## 2024-02-01 PROCEDURE — 99205 OFFICE O/P NEW HI 60 MIN: CPT | Mod: S$PBB,,, | Performed by: NURSE PRACTITIONER

## 2024-02-01 PROCEDURE — 1159F MED LIST DOCD IN RCRD: CPT | Mod: CPTII,,, | Performed by: NURSE PRACTITIONER

## 2024-02-01 PROCEDURE — 3008F BODY MASS INDEX DOCD: CPT | Mod: CPTII,,, | Performed by: NURSE PRACTITIONER

## 2024-02-01 PROCEDURE — 3078F DIAST BP <80 MM HG: CPT | Mod: CPTII,,, | Performed by: NURSE PRACTITIONER

## 2024-02-01 RX ORDER — ESCITALOPRAM OXALATE 5 MG/1
5 TABLET ORAL DAILY
Qty: 90 TABLET | Refills: 1 | Status: SHIPPED | OUTPATIENT
Start: 2024-02-01 | End: 2024-05-02

## 2024-02-01 RX ORDER — ESCITALOPRAM OXALATE 5 MG/1
5 TABLET ORAL DAILY
Qty: 90 TABLET | Refills: 111 | Status: SHIPPED | OUTPATIENT
Start: 2024-02-01 | End: 2024-02-01 | Stop reason: SDUPTHER

## 2024-02-15 ENCOUNTER — TELEPHONE (OUTPATIENT)
Dept: OBSTETRICS AND GYNECOLOGY | Facility: CLINIC | Age: 24
End: 2024-02-15
Payer: MEDICAID

## 2024-02-16 ENCOUNTER — PATIENT MESSAGE (OUTPATIENT)
Dept: INTERNAL MEDICINE | Facility: CLINIC | Age: 24
End: 2024-02-16
Payer: MEDICAID

## 2024-04-29 ENCOUNTER — PATIENT MESSAGE (OUTPATIENT)
Dept: INTERNAL MEDICINE | Facility: CLINIC | Age: 24
End: 2024-04-29
Payer: MEDICAID

## 2024-04-30 ENCOUNTER — LAB VISIT (OUTPATIENT)
Dept: LAB | Facility: HOSPITAL | Age: 24
End: 2024-04-30
Attending: NURSE PRACTITIONER
Payer: MEDICAID

## 2024-04-30 DIAGNOSIS — U07.1 COVID-19: ICD-10-CM

## 2024-04-30 DIAGNOSIS — Z13.6 SCREENING FOR HYPERTENSION: ICD-10-CM

## 2024-04-30 DIAGNOSIS — R79.89 ABNORMAL CBC: ICD-10-CM

## 2024-04-30 DIAGNOSIS — Z86.32 HISTORY OF GESTATIONAL DIABETES: ICD-10-CM

## 2024-04-30 DIAGNOSIS — R00.0 SINUS TACHYCARDIA BY ELECTROCARDIOGRAPHY: ICD-10-CM

## 2024-04-30 DIAGNOSIS — N92.6 MISSED PERIOD: Primary | ICD-10-CM

## 2024-04-30 LAB
ANION GAP SERPL CALC-SCNC: 8 MEQ/L
BASOPHILS # BLD AUTO: 0.03 X10(3)/MCL
BASOPHILS NFR BLD AUTO: 0.6 %
BUN SERPL-MCNC: 8.5 MG/DL (ref 7–18.7)
CALCIUM SERPL-MCNC: 9.5 MG/DL (ref 8.4–10.2)
CHLORIDE SERPL-SCNC: 106 MMOL/L (ref 98–107)
CHOLEST SERPL-MCNC: 109 MG/DL
CHOLEST/HDLC SERPL: 2 {RATIO} (ref 0–5)
CO2 SERPL-SCNC: 25 MMOL/L (ref 22–29)
CREAT SERPL-MCNC: 0.61 MG/DL (ref 0.55–1.02)
CREAT/UREA NIT SERPL: 14
EOSINOPHIL # BLD AUTO: 0.11 X10(3)/MCL (ref 0–0.9)
EOSINOPHIL NFR BLD AUTO: 2.1 %
ERYTHROCYTE [DISTWIDTH] IN BLOOD BY AUTOMATED COUNT: 12.4 % (ref 11.5–17)
GFR SERPLBLD CREATININE-BSD FMLA CKD-EPI: >60 MLS/MIN/1.73/M2
GLUCOSE SERPL-MCNC: 96 MG/DL (ref 74–100)
HCT VFR BLD AUTO: 39.6 % (ref 37–47)
HDLC SERPL-MCNC: 46 MG/DL (ref 35–60)
HGB BLD-MCNC: 13.5 G/DL (ref 12–16)
IMM GRANULOCYTES # BLD AUTO: 0.01 X10(3)/MCL (ref 0–0.04)
IMM GRANULOCYTES NFR BLD AUTO: 0.2 %
IRON SATN MFR SERPL: 51 % (ref 20–50)
IRON SERPL-MCNC: 137 UG/DL (ref 50–170)
LDLC SERPL CALC-MCNC: 54 MG/DL (ref 50–140)
LYMPHOCYTES # BLD AUTO: 2 X10(3)/MCL (ref 0.6–4.6)
LYMPHOCYTES NFR BLD AUTO: 38.7 %
MCH RBC QN AUTO: 30.8 PG (ref 27–31)
MCHC RBC AUTO-ENTMCNC: 34.1 G/DL (ref 33–36)
MCV RBC AUTO: 90.2 FL (ref 80–94)
MONOCYTES # BLD AUTO: 0.35 X10(3)/MCL (ref 0.1–1.3)
MONOCYTES NFR BLD AUTO: 6.8 %
NEUTROPHILS # BLD AUTO: 2.67 X10(3)/MCL (ref 2.1–9.2)
NEUTROPHILS NFR BLD AUTO: 51.6 %
NRBC BLD AUTO-RTO: 0 %
PLATELET # BLD AUTO: 192 X10(3)/MCL (ref 130–400)
PMV BLD AUTO: 10.5 FL (ref 7.4–10.4)
POTASSIUM SERPL-SCNC: 3.9 MMOL/L (ref 3.5–5.1)
RBC # BLD AUTO: 4.39 X10(6)/MCL (ref 4.2–5.4)
SODIUM SERPL-SCNC: 139 MMOL/L (ref 136–145)
TIBC SERPL-MCNC: 132 UG/DL (ref 70–310)
TIBC SERPL-MCNC: 269 UG/DL (ref 250–450)
TRANSFERRIN SERPL-MCNC: 238 MG/DL (ref 180–382)
TRIGL SERPL-MCNC: 46 MG/DL (ref 37–140)
TSH SERPL-ACNC: 1.29 UIU/ML (ref 0.35–4.94)
VLDLC SERPL CALC-MCNC: 9 MG/DL
WBC # SPEC AUTO: 5.17 X10(3)/MCL (ref 4.5–11.5)

## 2024-04-30 PROCEDURE — 80061 LIPID PANEL: CPT

## 2024-04-30 PROCEDURE — 85025 COMPLETE CBC W/AUTO DIFF WBC: CPT

## 2024-04-30 PROCEDURE — 83540 ASSAY OF IRON: CPT

## 2024-04-30 PROCEDURE — 36415 COLL VENOUS BLD VENIPUNCTURE: CPT

## 2024-04-30 PROCEDURE — 84443 ASSAY THYROID STIM HORMONE: CPT

## 2024-04-30 PROCEDURE — 80048 BASIC METABOLIC PNL TOTAL CA: CPT

## 2024-05-01 ENCOUNTER — LAB VISIT (OUTPATIENT)
Dept: LAB | Facility: HOSPITAL | Age: 24
End: 2024-05-01
Attending: NURSE PRACTITIONER
Payer: MEDICAID

## 2024-05-01 DIAGNOSIS — N92.6 MISSED PERIOD: ICD-10-CM

## 2024-05-01 LAB — B-HCG FREE SERPL-ACNC: <2.42 MIU/ML

## 2024-05-01 PROCEDURE — 36415 COLL VENOUS BLD VENIPUNCTURE: CPT

## 2024-05-01 PROCEDURE — 84702 CHORIONIC GONADOTROPIN TEST: CPT

## 2024-05-01 NOTE — TELEPHONE ENCOUNTER
Pt returned call to clinic and I advised her of lab-on. Pt verbalized understanding with no further questions or concerns.

## 2024-05-01 NOTE — TELEPHONE ENCOUNTER
Called pt and LVM to return call to [facility] [place] in regards to lab add-on

## 2024-05-02 ENCOUNTER — OFFICE VISIT (OUTPATIENT)
Dept: INTERNAL MEDICINE | Facility: CLINIC | Age: 24
End: 2024-05-02
Payer: MEDICAID

## 2024-05-02 VITALS
BODY MASS INDEX: 20.98 KG/M2 | OXYGEN SATURATION: 97 % | SYSTOLIC BLOOD PRESSURE: 103 MMHG | TEMPERATURE: 98 F | HEART RATE: 102 BPM | WEIGHT: 114 LBS | DIASTOLIC BLOOD PRESSURE: 70 MMHG | HEIGHT: 62 IN | RESPIRATION RATE: 18 BRPM

## 2024-05-02 DIAGNOSIS — Z00.00 WELL ADULT EXAM: ICD-10-CM

## 2024-05-02 DIAGNOSIS — Z13.6 SCREENING FOR HYPERTENSION: ICD-10-CM

## 2024-05-02 DIAGNOSIS — R00.0 SINUS TACHYCARDIA BY ELECTROCARDIOGRAPHY: ICD-10-CM

## 2024-05-02 DIAGNOSIS — G90.A POTS (POSTURAL ORTHOSTATIC TACHYCARDIA SYNDROME): ICD-10-CM

## 2024-05-02 DIAGNOSIS — F41.9 ANXIETY DISORDER, UNSPECIFIED TYPE: Primary | ICD-10-CM

## 2024-05-02 PROCEDURE — 3078F DIAST BP <80 MM HG: CPT | Mod: CPTII,,, | Performed by: NURSE PRACTITIONER

## 2024-05-02 PROCEDURE — 1159F MED LIST DOCD IN RCRD: CPT | Mod: CPTII,,, | Performed by: NURSE PRACTITIONER

## 2024-05-02 PROCEDURE — 3044F HG A1C LEVEL LT 7.0%: CPT | Mod: CPTII,,, | Performed by: NURSE PRACTITIONER

## 2024-05-02 PROCEDURE — 99213 OFFICE O/P EST LOW 20 MIN: CPT | Mod: PBBFAC | Performed by: NURSE PRACTITIONER

## 2024-05-02 PROCEDURE — 3074F SYST BP LT 130 MM HG: CPT | Mod: CPTII,,, | Performed by: NURSE PRACTITIONER

## 2024-05-02 PROCEDURE — 3008F BODY MASS INDEX DOCD: CPT | Mod: CPTII,,, | Performed by: NURSE PRACTITIONER

## 2024-05-02 PROCEDURE — 99213 OFFICE O/P EST LOW 20 MIN: CPT | Mod: S$PBB,,, | Performed by: NURSE PRACTITIONER

## 2024-05-02 NOTE — PROGRESS NOTES
Patient ID: [id number]     Chief Complaint: LAB RESULTS        HPI:     ASHTYN Guerrero is a 23 y.o. female here today for a follow up. Pt followed in Cardio cl for Sinus Tachy, POTS. Pt has hx of Gestational DM, Anxiety.        Immunizations:   Immunization History   Administered Date(s) Administered    DT (Pediatric) 07/04/2010    DTP 2000, 2000, 01/23/2001    DTaP 07/24/2001, 08/11/2005    HIB 2000, 2000, 01/23/2001, 07/24/2001    HPV 9-Valent 10/16/2017    Hep B / HiB 2000, 01/23/2001    Hepatitis B 2000, 2000, 01/23/2001    Hepatitis B, Pediatric/Adolescent 2000    HiB PRP-T 2000, 07/24/2001    IPV 08/11/2005    Influenza 12/04/2019, 11/09/2020    Influenza - Quadrivalent - PF *Preferred* (6 months and older) 12/04/2019, 11/09/2020    MMR 07/24/2001, 08/11/2005    Meningococcal Conjugate (MCV4P) 08/10/2013, 10/16/2017    OPV 2000, 2000, 01/23/2001    Pneumococcal Conjugate - 13 Valent 2000, 07/24/2001    Pneumococcal Conjugate - 7 Valent 2000, 07/24/2001    Poliovirus 2000, 2000, 01/23/2001    Tdap 08/10/2013    Varicella 07/30/2001, 07/20/2010, 08/10/2013        -------------------------------------    Anxiety disorder, unspecified    Irregular uterine contractions        Past Surgical History:   Procedure Laterality Date    TONSILECTOMY      TYMPANOSTOMY TUBE PLACEMENT         Review of patient's allergies indicates:   Allergen Reactions    House dust Hives, Itching, Nausea And Vomiting and Shortness Of Breath       Current Outpatient Medications   Medication Instructions    EScitalopram oxalate (LEXAPRO) 5 mg, Oral, Daily       Social History     Socioeconomic History    Marital status: Significant Other   Tobacco Use    Smoking status: Never     Passive exposure: Never    Smokeless tobacco: Never   Substance and Sexual Activity    Alcohol use: Not Currently    Drug use: Never     Comment: CBD use    Sexual  activity: Not Currently     Partners: Male     Birth control/protection: None     Social Determinants of Health     Financial Resource Strain: Medium Risk (1/10/2024)    Overall Financial Resource Strain (CARDIA)     Difficulty of Paying Living Expenses: Somewhat hard   Food Insecurity: Food Insecurity Present (1/10/2024)    Hunger Vital Sign     Worried About Running Out of Food in the Last Year: Sometimes true     Ran Out of Food in the Last Year: Never true   Transportation Needs: No Transportation Needs (2/1/2024)    PRAPARE - Transportation     Lack of Transportation (Medical): No     Lack of Transportation (Non-Medical): No   Physical Activity: Sufficiently Active (1/10/2024)    Exercise Vital Sign     Days of Exercise per Week: 5 days     Minutes of Exercise per Session: 40 min   Stress: Stress Concern Present (1/10/2024)    [place] [employer] - Occupational Stress Questionnaire     Feeling of Stress : Rather much   Housing Stability: High Risk (1/10/2024)    Housing Stability Vital Sign     Unable to Pay for Housing in the Last Year: Yes     Number of Places Lived in the Last Year: 1     Unstable Housing in the Last Year: No        Family History   Problem Relation Name Age of Onset    Lung cancer Maternal Aunt      Uterine cancer Maternal Aunt      Breast cancer Neg Hx      Colon cancer Neg Hx      Ovarian cancer Neg Hx      Cervical cancer Neg Hx          Patient Care Team:  Eladia Abad FNP as PCP - General (Family Medicine)     Subjective:     Review of Systems     See HPI for details    Constitutional: Denies Change in appetite. Denies Chills. Denies Fever. Denies Night sweats.  Eye: Denies Blurred vision. Denies Discharge. Denies Eye pain.  ENT: Denies Decreased hearing. Denies Sore throat. Denies Swollen glands.  Respiratory: Denies Cough. Denies Shortness of breath. Denies Shortness of breath with exertion. Denies Wheezing.  Cardiovascular: DeniesChest pain at rest.  "Denies Chest pain with exertion. Denies Irregular heartbeat. Denies Palpitations. Denies Edema.  Gastrointestinal: Denies Abdominal pain. DeniesDiarrhea. Denies Nausea. Denies Vomiting. Denies Hematemesis or Hematochezia.  Genitourinary: Denies Dysuria. Denies Urinary frequency. Denies Urinary urgency. Denies Blood in urine.  Endocrine: Denies Cold intolerance. Denies Excessive thirst. Denies Heat intolerance. Denies Weight loss. Denies Weight gain.  Musculoskeletal: Denies Painful joints. Denies Weakness.  Integumentary: Denies Rash. Denies Itching. Denies Dry skin.  Neurologic: Denies Dizziness. Denies Fainting. Denies Headache.  Psychiatric: Denies Depression. Denies Anxiety. Denies Suicidal/Homicidal ideations.    All Other ROS: Negative except as stated in HPI.       Objective:     Visit Vitals  /70 (BP Location: Left arm, Patient Position: Sitting, BP Method: Medium (Automatic))   Pulse 102   Temp 98.4 °F (36.9 °C) (Oral)   Resp 18   Ht 5' 2" (1.575 m)   Wt 51.7 kg (114 lb)   SpO2 97%   BMI 20.85 kg/m²       Physical Exam    General: Alert and oriented, No acute distress.  Head: Normocephalic, Atraumatic.  Eye: Pupils are equal, round and reactive to light, Extraocular movements are intact, Sclera non-icteric.  Ears/Nose/Throat: Normal, Mucosa moist,Clear.  Neck/Thyroid: Supple, Non-tender, No carotid bruit, No lymphadenopathy, No JVD, Full range of motion.  Respiratory: Clear to auscultation bilaterally; No wheezes, rales or rhonchi,Non-labored respirations, Symmetrical chest wall expansion.  Cardiovascular: Regular rate and rhythm, S1/S2 normal, No murmurs, rubs or gallops.  Gastrointestinal: Soft, Non-tender, Non-distended, Normal bowel sounds, No palpable organomegaly.  Musculoskeletal: Normal range of motion.  Integumentary: Warm, Dry, Intact, No suspicious lesions or rashes.  Extremities: No clubbing, cyanosis or edema  Neurologic: No focal deficits, Cranial Nerves II-XII are grossly intact, Motor " strength normal upper and lower extremities, Sensory exam intact.  Psychiatric: Normal interaction, Coherent speech, Euthymic mood, Appropriate affect       Labs Reviewed:     Chemistry:  Lab Results   Component Value Date     04/30/2024    K 3.9 04/30/2024    CHLORIDE 106 04/30/2024    BUN 8.5 04/30/2024    CREATININE 0.61 04/30/2024    EGFRNORACEVR >60 04/30/2024    GLUCOSE 96 04/30/2024    CALCIUM 9.5 04/30/2024    ALKPHOS 42 01/13/2024    LABPROT 5.9 (L) 01/13/2024    ALBUMIN 3.4 (L) 01/13/2024    BILIDIR 0.4 06/14/2021    IBILI 0.40 06/14/2021    AST 12 01/13/2024    ALT 8 01/13/2024    MG 1.50 (L) 01/10/2024    PHOS 2.5 01/09/2024        Lab Results   Component Value Date    HGBA1C 4.7 01/11/2024        Hematology:  Lab Results   Component Value Date    WBC 5.17 04/30/2024    HGB 13.5 04/30/2024    HCT 39.6 04/30/2024     04/30/2024       Lipid Panel:  Lab Results   Component Value Date    CHOL 109 04/30/2024    HDL 46 04/30/2024    LDL 54.00 04/30/2024    TRIG 46 04/30/2024    TOTALCHOLEST 2 04/30/2024        Urine:  Lab Results   Component Value Date    COLORUA Colorless (A) 01/11/2024    APPEARANCEUA Clear 01/11/2024    SGUA 1.008 01/11/2024    PHUA 5.5 01/11/2024    PROTEINUA Negative 01/11/2024    GLUCOSEUA 3+ (A) 01/11/2024    KETONESUA 2+ (A) 01/11/2024    BLOODUA Negative 01/11/2024    NITRITESUA Negative 01/11/2024    LEUKOCYTESUR Negative 01/11/2024    RBCUA 0-5 01/11/2024    WBCUA 0-5 01/11/2024    BACTERIA None Seen 01/11/2024    SQEPUA Trace (A) 01/11/2024    HYALINECASTS None Seen 01/11/2024        Assessment:       ICD-10-CM ICD-9-CM   1. Anxiety disorder, unspecified type  F41.9 300.00   2. Sinus tachycardia by electrocardiography  R00.0 785.0   3. Screening for hypertension  Z13.6 V81.1   4. Well adult exam  Z00.00 V70.0   5. POTS (postural orthostatic tachycardia syndrome)  G90.A 427.89        Plan:     1. Anxiety disorder, unspecified type  Denies SI/HI. Pt stopped taking  Lexapro on her own due to causing CP. Pt denies further episodes of CP.     2. Sinus tachycardia by electrocardiography  Pt followed in cardio cl. Keep appts.     3. Screening for hypertension  Labs in 8 months.     4. Well adult exam  Labs in 8 months.     5. POTS (postural orthostatic tachycardia syndrome)  Pt followed in cardio cl. Keep appts. Next appt scheduled 7-16-24.          Follow up in about 8 months (around 1/2/2025) for with labs 1 week prior to appt. . In addition to their scheduled follow up, the patient has also been instructed to follow up on as needed basis.     Future Appointments   Date Time Provider Department Center   7/16/2024  8:00 AM Ana Parker MD [facility]

## 2024-05-06 PROBLEM — Z00.00 WELL ADULT EXAM: Status: RESOLVED | Noted: 2024-02-01 | Resolved: 2024-05-06

## 2024-05-06 PROBLEM — Z13.6 SCREENING FOR HYPERTENSION: Status: RESOLVED | Noted: 2024-02-01 | Resolved: 2024-05-06

## 2024-05-21 ENCOUNTER — OFFICE VISIT (OUTPATIENT)
Dept: OBSTETRICS AND GYNECOLOGY | Facility: CLINIC | Age: 24
End: 2024-05-21
Payer: MEDICAID

## 2024-05-21 ENCOUNTER — PATIENT MESSAGE (OUTPATIENT)
Dept: CARDIOLOGY | Facility: CLINIC | Age: 24
End: 2024-05-21
Payer: MEDICAID

## 2024-05-21 ENCOUNTER — PATIENT MESSAGE (OUTPATIENT)
Dept: OBSTETRICS AND GYNECOLOGY | Facility: CLINIC | Age: 24
End: 2024-05-21

## 2024-05-21 VITALS
HEIGHT: 62 IN | BODY MASS INDEX: 21.02 KG/M2 | SYSTOLIC BLOOD PRESSURE: 100 MMHG | WEIGHT: 114.19 LBS | DIASTOLIC BLOOD PRESSURE: 60 MMHG | TEMPERATURE: 97 F

## 2024-05-21 DIAGNOSIS — Z32.01 POSITIVE PREGNANCY TEST: Primary | ICD-10-CM

## 2024-05-21 DIAGNOSIS — N91.5 OLIGOMENORRHEA, UNSPECIFIED TYPE: ICD-10-CM

## 2024-05-21 LAB
B-HCG UR QL: POSITIVE
CTP QC/QA: YES

## 2024-05-21 PROCEDURE — 3044F HG A1C LEVEL LT 7.0%: CPT | Mod: CPTII,,, | Performed by: OBSTETRICS & GYNECOLOGY

## 2024-05-21 PROCEDURE — 3008F BODY MASS INDEX DOCD: CPT | Mod: CPTII,,, | Performed by: OBSTETRICS & GYNECOLOGY

## 2024-05-21 PROCEDURE — 3078F DIAST BP <80 MM HG: CPT | Mod: CPTII,,, | Performed by: OBSTETRICS & GYNECOLOGY

## 2024-05-21 PROCEDURE — 99203 OFFICE O/P NEW LOW 30 MIN: CPT | Mod: TH,,, | Performed by: OBSTETRICS & GYNECOLOGY

## 2024-05-21 PROCEDURE — 3074F SYST BP LT 130 MM HG: CPT | Mod: CPTII,,, | Performed by: OBSTETRICS & GYNECOLOGY

## 2024-05-21 PROCEDURE — 1159F MED LIST DOCD IN RCRD: CPT | Mod: CPTII,,, | Performed by: OBSTETRICS & GYNECOLOGY

## 2024-05-21 PROCEDURE — 81025 URINE PREGNANCY TEST: CPT | Mod: ,,, | Performed by: OBSTETRICS & GYNECOLOGY

## 2024-05-21 NOTE — PROGRESS NOTES
Chief Complaint     UPT Confirmation (LMP: 3/25/24 +UPT)    HPI:     Patient is a 23 y.o.  presents today for UPT confirmation. LMP 3/25/24, UPT positive today in clinic. Doing well.  Dx with POTS recently.  Currently asymptomatic.     Gyn History:    Menarchal  Menarche Age: 17 years  Cycle: Yes  Flow Duration: 5 days  Flow: Normal  Interval: monthly  Intermenstrual Bleeding: No  Dysmenorrhea: No  [place]  Sexually Active: Yes  Sexual Orientation: heterosexual  Postcoital Bleeding: No  Dyspareunia: No  STI History: No  Contraception: No  Breast History  Last Breast Imaging Date:  (Never had one)  History of Breast Biopsy:  (N/A)  Pap History   Last pap date:  (Never had one)  Result:  (N/A)  History of Abnormal Pap:  (N/A)  HPV Vaccine Completed: No (1/3)        Past Medical History:   Diagnosis Date    Anxiety disorder, unspecified     Irregular uterine contractions        Past Surgical History:   Procedure Laterality Date    TONSILECTOMY      TYMPANOSTOMY TUBE PLACEMENT         Family History   Problem Relation Name Age of Onset    Lung cancer Maternal Aunt      Uterine cancer Maternal Aunt      Breast cancer Neg Hx      Colon cancer Neg Hx      Ovarian cancer Neg Hx      Cervical cancer Neg Hx         OB History          2    Para   1    Term   1            AB   1    Living   1         SAB        IAB        Ectopic        Multiple   0    Live Births   1                 No current outpatient medications on file prior to visit.     No current facility-administered medications on file prior to visit.       Review of Systems:       Review of Systems   Constitutional:  Negative for chills and fever.   Gastrointestinal:  Negative for abdominal pain, constipation and diarrhea.   Genitourinary:  Negative for bladder incontinence, decreased libido, dysmenorrhea, dyspareunia, dysuria, flank pain, frequency, genital sores, hematuria, hot flashes, menorrhagia, menstrual problem, pelvic pain,  "urgency, vaginal bleeding, vaginal discharge, vaginal pain, urinary incontinence, postcoital bleeding, postmenopausal bleeding, vaginal dryness and vaginal odor.        Physical Exam:    /60 (BP Location: Right arm, Patient Position: Sitting, BP Method: Medium (Manual))   Temp 97.3 °F (36.3 °C) (Temporal)   Ht 5' 2" (1.575 m)   Wt 51.8 kg (114 lb 3.2 oz)   LMP 03/25/2024 (Exact Date)   BMI 20.89 kg/m²     Physical Exam   General Exam:    General Appearance: alert, in no acute distress, normal, well nourished.  Psych:  Orientation: time, place, person.  Mood/Affect: Normal       Assessment:   1. Positive pregnancy test    2. Oligomenorrhea, unspecified type  -     POCT urine pregnancy             Plan:     Positive UPT today in clinic  Prenatal counseling  Tobacco avoidance/cessation  Illicit drug avoidance  PNV, folic acid   Discussed POTS in pregnancy.  Recommend staying well hydrated.  Be slow to rise from a lying or sitting position to avoid hypotensive episode    RTC New OB in 1 week      This note was transcribed by Geena Weber. There may be transcription errors as a result, however minimal. Effort has been made to ensure accuracy of transcription, but any obvious errors or omissions should be clarified with the author of the document.       "

## 2024-05-27 ENCOUNTER — INITIAL PRENATAL (OUTPATIENT)
Dept: OBSTETRICS AND GYNECOLOGY | Facility: CLINIC | Age: 24
End: 2024-05-27
Payer: MEDICAID

## 2024-05-27 VITALS
SYSTOLIC BLOOD PRESSURE: 100 MMHG | DIASTOLIC BLOOD PRESSURE: 60 MMHG | BODY MASS INDEX: 21.07 KG/M2 | TEMPERATURE: 97 F | WEIGHT: 115.19 LBS

## 2024-05-27 DIAGNOSIS — Z3A.01 7 WEEKS GESTATION OF PREGNANCY: ICD-10-CM

## 2024-05-27 DIAGNOSIS — G90.A POTS (POSTURAL ORTHOSTATIC TACHYCARDIA SYNDROME): ICD-10-CM

## 2024-05-27 DIAGNOSIS — Z86.32 HISTORY OF GESTATIONAL DIABETES IN PRIOR PREGNANCY, CURRENTLY PREGNANT IN FIRST TRIMESTER: ICD-10-CM

## 2024-05-27 DIAGNOSIS — Z12.4 SCREENING FOR MALIGNANT NEOPLASM OF THE CERVIX: ICD-10-CM

## 2024-05-27 DIAGNOSIS — O09.291 HISTORY OF GESTATIONAL DIABETES IN PRIOR PREGNANCY, CURRENTLY PREGNANT IN FIRST TRIMESTER: ICD-10-CM

## 2024-05-27 DIAGNOSIS — F41.9 ANXIETY DURING PREGNANCY: Primary | ICD-10-CM

## 2024-05-27 DIAGNOSIS — O99.340 ANXIETY DURING PREGNANCY: Primary | ICD-10-CM

## 2024-05-27 PROBLEM — O47.9 IRREGULAR UTERINE CONTRACTIONS: Status: RESOLVED | Noted: 2023-02-24 | Resolved: 2024-05-27

## 2024-05-27 PROBLEM — O20.0 THREATENED ABORTION: Status: RESOLVED | Noted: 2023-01-12 | Resolved: 2024-05-27

## 2024-05-27 PROBLEM — O24.419 GESTATIONAL DIABETES MELLITUS (GDM) IN THIRD TRIMESTER: Status: RESOLVED | Noted: 2023-02-13 | Resolved: 2024-05-27

## 2024-05-27 LAB
BILIRUB UR QL STRIP: NEGATIVE
GLUCOSE UR QL STRIP: NEGATIVE
KETONES UR QL STRIP: NEGATIVE
LEUKOCYTE ESTERASE UR QL STRIP: NEGATIVE
PH, POC UA: 7
POC BLOOD, URINE: NEGATIVE
POC NITRATES, URINE: NEGATIVE
PROT UR QL STRIP: NEGATIVE
SP GR UR STRIP: 1.02 (ref 1–1.03)
UROBILINOGEN UR STRIP-ACNC: 0.2 (ref 0.1–1.1)

## 2024-05-27 PROCEDURE — 87661 TRICHOMONAS VAGINALIS AMPLIF: CPT | Performed by: OBSTETRICS & GYNECOLOGY

## 2024-05-27 PROCEDURE — 87491 CHLMYD TRACH DNA AMP PROBE: CPT | Performed by: OBSTETRICS & GYNECOLOGY

## 2024-05-27 PROCEDURE — 99204 OFFICE O/P NEW MOD 45 MIN: CPT | Mod: TH,,, | Performed by: OBSTETRICS & GYNECOLOGY

## 2024-05-27 PROCEDURE — 87086 URINE CULTURE/COLONY COUNT: CPT | Performed by: OBSTETRICS & GYNECOLOGY

## 2024-05-27 PROCEDURE — 87624 HPV HI-RISK TYP POOLED RSLT: CPT | Performed by: OBSTETRICS & GYNECOLOGY

## 2024-05-27 PROCEDURE — 76817 TRANSVAGINAL US OBSTETRIC: CPT | Mod: ,,, | Performed by: OBSTETRICS & GYNECOLOGY

## 2024-05-27 PROCEDURE — 87591 N.GONORRHOEAE DNA AMP PROB: CPT | Performed by: OBSTETRICS & GYNECOLOGY

## 2024-05-27 NOTE — PROGRESS NOTES
Chief Complaint:  Initial Prenatal Visit    History of Present Illness:  Robyn Guerrero is a 23 y.o. year old  presents for her new ob, 9w0d by LMP 3/25/24.  Patient has a history of anxiety but is not currently on medication.  She is doing well    No hx of genetic disorders for self, FOB      Gyn History:  LMP: 3/25/24  Frequency: monthly   Cycle length: 5 days   Flow: normal  Intermenstrual Bleeding: No  Postcoital Bleeding: No  Dysmenorrhea: No  Sexually Active: yes  Dyspareunia: Yes  Contraception: None   H/o STI: No   Last pap: none noted  H/o Abnormal Pap: n/a  Gardasil: 1/3        Review of Systems:  General/Constitutional: Chills denies. Fatigue/weakness denies. Fever denies. Night sweats denies. Hot flashes denies.   Respiratory: Cough denies. Hemoptysis denies. SOB denies. Sputum production denies. Wheezing denies.   Cardiovascular: Chest pain denies. Dizziness denies. Palpitations denies. Swelling in hands/feet denies.    Gastrointestinal: Abdominal pain denies. Blood in stool denies. Constipation denies. Diarrhea denies. Heartburn denies. Nausea denies. Vomiting denies.   Genitourinary: Incontinence denies. Blood in urine denies. Frequent urination denies. Painful urination denies.  Urinary urgency denies.  Nocturia denies.   Gynecologic: Irregular menses denies. Heavy bleeding denies. Painful menses denies. Vaginal discharge denies. Vaginal odor denies. Vaginal itching denies. Vaginal lesion denies.  Pelvic pain denies. Decreased libido denies. Vulvar lesion denies. Prolapse of genital organs denies. Painful intercourse denies. Postcoital bleeding denies.   Psychiatric: Depression denies. Anxiety denies.     OB History    Para Term  AB Living   3 1 1 0 1 1   SAB IAB Ectopic Multiple Live Births   0 0 0 0 1      # 1 - Date: 21, Sex: None, Weight: None, GA: 6w0d, Type: Spontaneous , Apgar1: None, Apgar5: None, Living: Fetal Demise, Birth Comments: None    # 2 - Date:  [birth year], Sex: Male, Weight: 3.204 kg (7 lb 1 oz), GA: 39w1d, Type: Vaginal, Vacuum (Extractor), Apgar1: 6, Apgar5: 7, Living: Living, Birth Comments: None    # 3 - Date: None, Sex: None, Weight: None, GA: None, Type: None, Apgar1: None, Apgar5: None, Living: None, Birth Comments: None        Past Medical History:   Diagnosis Date    Anxiety disorder, unspecified     POTS (postural orthostatic tachycardia syndrome)      No current outpatient medications on file.    Review of patient's allergies indicates:   Allergen Reactions    House dust Hives, Itching, Nausea And Vomiting and Shortness Of Breath       Past Surgical History:   Procedure Laterality Date    TONSILECTOMY      TYMPANOSTOMY TUBE PLACEMENT       Family History   Problem Relation Name Age of Onset    Lung cancer Maternal Aunt Maya Hoskins     Uterine cancer Maternal Aunt Maya Hoskins     Endometrial cancer Maternal Aunt Maya Hoskins     Osteoarthritis Mother Lacey     Endometrial cancer Maternal Aunt Zora Vasquez     Breast cancer Neg Hx      Colon cancer Neg Hx      Ovarian cancer Neg Hx      Cervical cancer Neg Hx       Social History     Socioeconomic History    Marital status: Significant Other   Tobacco Use    Smoking status: Never     Passive exposure: Never    Smokeless tobacco: Never   Substance and Sexual Activity    Alcohol use: Not Currently    Drug use: Never     Comment: CBD use    Sexual activity: Yes     Partners: Male     Birth control/protection: None     Comment: Basal Thermometer Tracking     Social Determinants of Health     Financial Resource Strain: Medium Risk (1/10/2024)    Overall Financial Resource Strain (CARDIA)     Difficulty of Paying Living Expenses: Somewhat hard   Food Insecurity: Food Insecurity Present (1/10/2024)    Hunger Vital Sign     Worried About Running Out of Food in the Last Year: Sometimes true     Ran Out of Food in the Last Year: Never true   Transportation Needs: No Transportation Needs (2/1/2024)     PRAPARE - Transportation     Lack of Transportation (Medical): No     Lack of Transportation (Non-Medical): No   Physical Activity: Sufficiently Active (1/10/2024)    Exercise Vital Sign     Days of Exercise per Week: 5 days     Minutes of Exercise per Session: 40 min   Stress: Stress Concern Present (1/10/2024)    [place] of [employer] - Occupational Stress Questionnaire     Feeling of Stress : Rather much   Housing Stability: High Risk (1/10/2024)    Housing Stability Vital Sign     Unable to Pay for Housing in the Last Year: Yes     Number of Places Lived in the Last Year: 1     Unstable Housing in the Last Year: No       Physical Exam:  /60 (BP Location: Right arm, Patient Position: Sitting, BP Method: Medium (Manual))   Temp 96.8 °F (36 °C) (Temporal)   Wt 52.3 kg (115 lb 3.2 oz)   LMP 03/25/2024 (Exact Date)   BMI 21.07 kg/m²     Chaperone: present.       General appearance: healthy, well-nourished and well-developed     Psychiatric: Orientation to time, place and person. Normal mood and affect and active, alert     Skin:   Appearance: no rashes or lesions.     Neck:   Neck: supple, FROM, trachea midline. and no masses   Thyroid: no enlargement or nodules and non-tender.       Cardiovascular:   Auscultation: RRR and no murmur.   Peripheral Vascular: no varicosities, LLE edema, RLE edema, calf tenderness, and palpable cord and pedal pulses intact.     Lungs:   Respiratory effort: no intercostal retractions or accessory muscle usage.   Auscultation: no wheezing, rales/crackles, or rhonchi and clear to auscultation.     Breast:   Inspection/Palpation: no tenderness, discrete/distinct masses, skin changes, or abnormal secretions. Nipple appearance normal.     Abdomen:   Auscultation/Inspection/Palpation: no hepatomegaly, splenomegaly, masses, tenderness or CVA tenderness and soft, non-distended bowel sounds preset.    Hernia: no palpable hernias.     Female Genitalia:   Vulva: no  masses, tenderness or lesions   Bladder/Urethra: no urethral discharge or mass, normal meatus, bladder non-distended.   Vagina: no tenderness, erythema, cystocele, rectocele, abnormal vaginal discharge or vesicle(s) or ulcers   Cervix: no discharge, no cervical lacerations noted or motion tenderness and grossly normal   Uterus: normal size and shape and midline, non-tender, and no uterine prolapse.   Adnexa/Parametria: no parametrial tenderness or mass, no adnexal tenderness or ovarian mass.     Lymph Nodes:   Palpation: non tender submandibular nodes, axillary nodes, or inguinal nodes.     Rectal Exam:   Rectum: normal perianal skin.     TVUS:  GA by LMP 9w0d  IUP W CRL: 7w0d  EVIE: 1/13/2025  FHT: 120  OVARIES: normal  FREE FLUID: no free fluid      Assessment/Plan:  1. Anxiety during pregnancy    2. 7 weeks gestation of pregnancy  -     POCT Urinalysis, Dipstick, Automated, W/O Scope  -     US OB/GYN Procedure ([facility]) - Extended List  -     Urine Culture High Risk  -     SYPHILIS ANTIBODY (WITH REFLEX RPR); Future; Expected date: 05/27/2024  -     Rubella Antibody, IgG; Future; Expected date: 05/27/2024  -     Varicella Zoster Antibody, IgG; Future; Expected date: 05/27/2024  -     HIV 1/2 Ag/Ab (4th Gen); Future; Expected date: 05/27/2024  -     Hepatitis C Antibody; Future; Expected date: 05/27/2024  -     Hepatitis B Surface Antigen; Future; Expected date: 05/27/2024  -     Hemoglobin Electrophoresis Evaluation, Blood; Future; Expected date: 05/27/2024  -     Type & Screen; Future; Expected date: 05/27/2024  -     CBC Without Differential; Future; Expected date: 05/27/2024  -     Glucose Tolerance 1 Hour; Future; Expected date: 05/27/2024    3. History of gestational diabetes in prior pregnancy, currently pregnant in first trimester    4. POTS (postural orthostatic tachycardia syndrome)    5. Screening for malignant neoplasm of the cervix  -     Liquid-Based Pap Smear, Screening Screening           Prenatal  counseling  Discussed appropriate weight gain for pregnancy  Tobacco avoidance/cessation  Illicit drug avoidance  PNL  First trimester norman  PNV, folic acid     Pap GC/CZ/TV     RTC 4 weeks ob check       This note was transcribed by Zena Herrmann MA. There may be transcription errors as a result, however minimal. Effort has been made to ensure accuracy of transcription, but any obvious errors or omissions should be clarified with the author of the document.

## 2024-05-30 LAB
BACTERIA UR CULT: NO GROWTH
CHLAMYDIA TRACHOMATIS: NEGATIVE
HIGH RISK HPV: NEGATIVE
NEISSERIA GONORRHOEAE: NEGATIVE
PSYCHE PATHOLOGY RESULT: NORMAL
TRICHOMONAS VAGINALIS: NEGATIVE

## 2024-06-27 NOTE — PROGRESS NOTES
HPI  23 y.o.  at 12w1d here for OB check.  Doing well.     O'Castaneda: 91    ROS  Review of Systems   Constitutional:  Negative for chills and fever.   Gastrointestinal:  Negative for abdominal pain, constipation and diarrhea.   Genitourinary:  Negative for flank pain, genital sores, pelvic pain, urgency, vaginal bleeding, vaginal discharge, vaginal pain, postcoital bleeding and vaginal odor.         OBJECTIVE  /62 (BP Location: Right arm, Patient Position: Sitting, BP Method: Medium (Manual))   Temp 98.6 °F (37 °C) (Temporal)   Wt 50.6 kg (111 lb 9.6 oz)   LMP 2024 (Exact Date)   BMI 20.41 kg/m²     Gen: No distress  Abdomen: Gravid, non-tender  Extremities: No edema    FHT: 160    ASSESSMENT  1. Anxiety during pregnancy    2. 12 weeks gestation of pregnancy  - POCT Urinalysis, Dipstick, Automated, W/O Scope    3. POTS (postural orthostatic tachycardia syndrome)    4. History of gestational diabetes in prior pregnancy, currently pregnant in first trimester        PLAN  Reviewed standard labor unit and kick count precautions.  Discussed pre-eclampsia precautions.  Discussed COVID-19 risks, social distancing, and isolation if respiratory symptoms develop.       RTC 4 weeks ob check     This note was transcribed by Geena Weber. There may be transcription errors as a result, however minimal. Effort has been made to ensure accuracy of transcription, but any obvious errors or omissions should be clarified with the author of the document.

## 2024-07-01 ENCOUNTER — LAB VISIT (OUTPATIENT)
Dept: LAB | Facility: HOSPITAL | Age: 24
End: 2024-07-01
Attending: OBSTETRICS & GYNECOLOGY
Payer: MEDICAID

## 2024-07-01 DIAGNOSIS — Z3A.01 7 WEEKS GESTATION OF PREGNANCY: ICD-10-CM

## 2024-07-01 LAB
ABO AND RH: NORMAL
ANTIBODY SCREEN: NORMAL
ERYTHROCYTE [DISTWIDTH] IN BLOOD BY AUTOMATED COUNT: 13.1 % (ref 11–14.5)
GLUCOSE 1H P 100 G GLC PO SERPL-MCNC: 91 MG/DL (ref 100–180)
HBV SURFACE AG SERPL QL IA: NEGATIVE
HBV SURFACE AG SERPL QL IA: NORMAL
HCT VFR BLD AUTO: 33.9 % (ref 36–48)
HCV AB SERPL QL IA: NONREACTIVE
HGB BLD-MCNC: 12 G/DL (ref 11.8–16)
HIV 1+2 AB+HIV1 P24 AG SERPL QL IA: NONREACTIVE
MCH RBC QN AUTO: 31.4 PG (ref 27–34)
MCHC RBC AUTO-ENTMCNC: 35.4 G/DL (ref 31–37)
MCV RBC AUTO: 88.7 FL (ref 79–99)
NRBC BLD AUTO-RTO: 0 %
PLATELET # BLD AUTO: 157 X10(3)/MCL (ref 140–371)
PMV BLD AUTO: 9.8 FL (ref 9.4–12.4)
RBC # BLD AUTO: 3.82 X10(6)/MCL (ref 4–5.1)
RUBELLA AB, IGG (OLG): 19.4 IU/ML
SPECIMEN OUTDATE: NORMAL
T PALLIDUM AB SER QL: NONREACTIVE
WBC # BLD AUTO: 3.67 X10(3)/MCL (ref 4–11.5)

## 2024-07-01 PROCEDURE — 85027 COMPLETE CBC AUTOMATED: CPT

## 2024-07-01 PROCEDURE — 86762 RUBELLA ANTIBODY: CPT

## 2024-07-01 PROCEDURE — 86901 BLOOD TYPING SEROLOGIC RH(D): CPT | Performed by: OBSTETRICS & GYNECOLOGY

## 2024-07-01 PROCEDURE — 87340 HEPATITIS B SURFACE AG IA: CPT

## 2024-07-01 PROCEDURE — 83021 HEMOGLOBIN CHROMOTOGRAPHY: CPT

## 2024-07-01 PROCEDURE — 86780 TREPONEMA PALLIDUM: CPT

## 2024-07-01 PROCEDURE — 82950 GLUCOSE TEST: CPT

## 2024-07-01 PROCEDURE — 86850 RBC ANTIBODY SCREEN: CPT | Performed by: OBSTETRICS & GYNECOLOGY

## 2024-07-01 PROCEDURE — 86803 HEPATITIS C AB TEST: CPT

## 2024-07-01 PROCEDURE — 83020 HEMOGLOBIN ELECTROPHORESIS: CPT

## 2024-07-01 PROCEDURE — 86900 BLOOD TYPING SEROLOGIC ABO: CPT | Performed by: OBSTETRICS & GYNECOLOGY

## 2024-07-01 PROCEDURE — 87389 HIV-1 AG W/HIV-1&-2 AB AG IA: CPT

## 2024-07-01 PROCEDURE — 36415 COLL VENOUS BLD VENIPUNCTURE: CPT

## 2024-07-01 PROCEDURE — 86787 VARICELLA-ZOSTER ANTIBODY: CPT

## 2024-07-02 ENCOUNTER — ROUTINE PRENATAL (OUTPATIENT)
Dept: OBSTETRICS AND GYNECOLOGY | Facility: CLINIC | Age: 24
End: 2024-07-02
Payer: MEDICAID

## 2024-07-02 VITALS
TEMPERATURE: 99 F | DIASTOLIC BLOOD PRESSURE: 62 MMHG | BODY MASS INDEX: 20.41 KG/M2 | SYSTOLIC BLOOD PRESSURE: 108 MMHG | WEIGHT: 111.63 LBS

## 2024-07-02 DIAGNOSIS — G90.A POTS (POSTURAL ORTHOSTATIC TACHYCARDIA SYNDROME): ICD-10-CM

## 2024-07-02 DIAGNOSIS — Z86.32 HISTORY OF GESTATIONAL DIABETES IN PRIOR PREGNANCY, CURRENTLY PREGNANT IN FIRST TRIMESTER: ICD-10-CM

## 2024-07-02 DIAGNOSIS — O09.291 HISTORY OF GESTATIONAL DIABETES IN PRIOR PREGNANCY, CURRENTLY PREGNANT IN FIRST TRIMESTER: ICD-10-CM

## 2024-07-02 DIAGNOSIS — O99.340 ANXIETY DURING PREGNANCY: Primary | ICD-10-CM

## 2024-07-02 DIAGNOSIS — F41.9 ANXIETY DURING PREGNANCY: Primary | ICD-10-CM

## 2024-07-02 DIAGNOSIS — Z3A.12 12 WEEKS GESTATION OF PREGNANCY: ICD-10-CM

## 2024-07-02 LAB
BILIRUB UR QL STRIP: NORMAL
GLUCOSE UR QL STRIP: NEGATIVE
KETONES UR QL STRIP: NORMAL
LEUKOCYTE ESTERASE UR QL STRIP: NEGATIVE
PH, POC UA: NORMAL
POC BLOOD, URINE: NORMAL
POC NITRATES, URINE: NEGATIVE
PROT UR QL STRIP: NEGATIVE
SP GR UR STRIP: NORMAL (ref 1–1.03)
UROBILINOGEN UR STRIP-ACNC: NORMAL (ref 0.3–2.2)
VZV IGG SER IA-ACNC: 1.5
VZV IGG SER QL IA: POSITIVE

## 2024-07-03 LAB
HGB A MFR BLD ELPH: 97.5 % (ref 95.8–98)
HGB A2 MFR BLD ELPH: 2.5 % (ref 2–3.3)
HGB F MFR BLD ELPH: 0 % (ref 0–0.9)
HGB FRACT BLD ELPH-IMP: NORMAL
M HPLC HB VARIANT, B: NORMAL

## 2024-07-16 ENCOUNTER — OFFICE VISIT (OUTPATIENT)
Dept: CARDIOLOGY | Facility: CLINIC | Age: 24
End: 2024-07-16
Payer: MEDICAID

## 2024-07-16 VITALS
DIASTOLIC BLOOD PRESSURE: 65 MMHG | HEIGHT: 62 IN | OXYGEN SATURATION: 99 % | BODY MASS INDEX: 20.65 KG/M2 | TEMPERATURE: 99 F | WEIGHT: 112.19 LBS | HEART RATE: 115 BPM | RESPIRATION RATE: 20 BRPM | SYSTOLIC BLOOD PRESSURE: 97 MMHG

## 2024-07-16 DIAGNOSIS — G90.A POTS (POSTURAL ORTHOSTATIC TACHYCARDIA SYNDROME): Primary | ICD-10-CM

## 2024-07-16 DIAGNOSIS — R00.0 SINUS TACHYCARDIA BY ELECTROCARDIOGRAPHY: ICD-10-CM

## 2024-07-16 DIAGNOSIS — Z3A.14 14 WEEKS GESTATION OF PREGNANCY: ICD-10-CM

## 2024-07-16 PROBLEM — Z34.90 CURRENTLY PREGNANT: Status: ACTIVE | Noted: 2024-07-16

## 2024-07-16 PROCEDURE — 99214 OFFICE O/P EST MOD 30 MIN: CPT | Mod: PBBFAC | Performed by: INTERNAL MEDICINE

## 2024-07-16 NOTE — PROGRESS NOTES
Cardiology attending addendum  Patient was seen and examined with POORNIMA Zhou. Agree with plan as outlined below. Pt here for follow up for possible POTS. Currently 14 weeks pregnant and has noticed worsening of her symptoms with pregnancy.  No syncope.  She is currently drinking about 48 oz of water a day and I advised her to increase it to at least 64 oz a day.  Also discussed liberating salt intake.  Compression stockings ordered as well.  I offered referring the patient to to Dr. Donnelly at [facility] for a tilt-table test and further management however he thinks he will have difficulty getting there especially with her toddler.    Follow-up after delivery    Ana Parker MD  Cardiology staff-[facility]

## 2024-07-16 NOTE — PROGRESS NOTES
Cardiology attending addendum  Patient was seen and examined with POORNIMA Zhou. Agree with plan as outlined below. Pt here for follow up for possible POTS. Currently 14 weeks pregnant and has noticed worsening of her symptoms with pregnancy.  No syncope.  She is currently drinking about 48 oz of water a day and I advised her to increase it to at least 64 oz a day.  Also discussed liberating salt intake.  Compression stockings ordered as well.  I offered referring the patient to to Dr. Donnelly at [facility] for a tilt-table test and further management however he thinks he will have difficulty getting there especially with her toddler.     Follow-up after delivery     Ana Parker MD  Cardiology staff-[facility]  Outpatient Cardiology Clinic    Chief Complaint: f/u denies chest pain or sob since LV no questions                                  HPI: Robyn Guerrero is a 23 y.o. yo female w/ PMH of  has a past medical history of spontaneous , gestational diabetes, recent COVID, ABDULAZIZ/MAD, who is here for a 6mo f/u after suspected diagnosis of POTS on 24. She reports minimal episodes of fast heart rate and nausea with standing until her recent pregnancy (14 WGA) which has worsened her symptoms. She reports waking up fine but becomes nauseous/dizzy with efforts to stand and once standing she notices an increased heart rate. She states this occurs about every other day with possible connection to her fluid/salt intake from the previous day. Her symptoms have been somewhat relieved with consumption of water while in bed. She mentions plain water causes her some nausea but she normally drinks around 48oz of water with Liquid IV added. She is currently only taking prenatal vitamins. She is a stay at home mom. Reports a normal diet and moderate exercise. She denies any syncopal episodes since her last visit. She denies SOB, chest pain, and leg swelling. Her main concern today is experiencing a syncopal  episode while pregnant or in the labor process. Her due date is 1/15/24.     Past Medical History:   has a past medical history of Anxiety disorder, unspecified and Irregular uterine contractions.      Past Surgical History:   has a past surgical history that includes TONSILECTOMY and Tympanostomy tube placement.      Family History:  family history includes Lung cancer in her maternal aunt; Uterine cancer in her maternal aunt.      Social History:   reports that she has never smoked. She has never been exposed to tobacco smoke. She has never used smokeless tobacco. She reports that she does not currently use alcohol. She reports that she does not use drugs. Previous vape user.      Allergies:  is allergic to house dust.                                                                                                                                                                                                                                                                                                                                                                                                                                                                                 CARDIAC TESTING:  Results for orders placed during the hospital encounter of 01/08/24    Echo Saline Bubble? No    Interpretation Summary    Left Ventricle: The left ventricle is normal in size. Normal wall thickness. There is normal systolic function. Biplane (2D) method of discs ejection fraction is 63%.    Right Ventricle: Normal right ventricular cavity size. Systolic function is normal.    IVC/SVC: Normal venous pressure at 3 mmHg.    No results found for this or any previous visit.     No results found for this or any previous visit.       Patient Active Problem List   Diagnosis    Anxiety disorder    Sinus tachycardia by electrocardiography    Anxiety during pregnancy    Vacuum extractor delivery, delivered    COVID-19    Tremor    POTS  (postural orthostatic tachycardia syndrome)    History of gestational diabetes in prior pregnancy, currently pregnant in first trimester     Past Surgical History:   Procedure Laterality Date    TONSILECTOMY      TYMPANOSTOMY TUBE PLACEMENT       Social History     Socioeconomic History    Marital status: Significant Other   Tobacco Use    Smoking status: Never     Passive exposure: Never    Smokeless tobacco: Never   Substance and Sexual Activity    Alcohol use: Not Currently    Drug use: Never     Comment: CBD use    Sexual activity: Yes     Partners: Male     Birth control/protection: None     Comment: Basal Thermometer Tracking     Social Determinants of Health     Financial Resource Strain: Medium Risk (1/10/2024)    Overall Financial Resource Strain (CARDIA)     Difficulty of Paying Living Expenses: Somewhat hard   Food Insecurity: Food Insecurity Present (1/10/2024)    Hunger Vital Sign     Worried About Running Out of Food in the Last Year: Sometimes true     Ran Out of Food in the Last Year: Never true   Transportation Needs: No Transportation Needs (2/1/2024)    PRAPARE - Transportation     Lack of Transportation (Medical): No     Lack of Transportation (Non-Medical): No   Physical Activity: Sufficiently Active (1/10/2024)    Exercise Vital Sign     Days of Exercise per Week: 5 days     Minutes of Exercise per Session: 40 min   Stress: Stress Concern Present (1/10/2024)    [employer] - Occupational Stress Questionnaire     Feeling of Stress : Rather much   Housing Stability: High Risk (1/10/2024)    Housing Stability Vital Sign     Unable to Pay for Housing in the Last Year: Yes     Number of Places Lived in the Last Year: 1     Unstable Housing in the Last Year: No        Family History   Problem Relation Name Age of Onset    Lung cancer Maternal Aunt Maya Hoskins     Uterine cancer Maternal Aunt Maya Hoskins     Endometrial cancer Maternal Aunt Maya Hoskins      "Osteoarthritis Mother Lacey     Endometrial cancer Maternal Aunt Zora Vasquez     Breast cancer Neg Hx      Colon cancer Neg Hx      Ovarian cancer Neg Hx      Cervical cancer Neg Hx       Review of patient's allergies indicates:   Allergen Reactions    House dust Hives, Itching, Nausea And Vomiting and Shortness Of Breath         ROS:                                                                                                                                                                             Negative except as stated in the history of present illness. See HPI for details.    PHYSICAL EXAM:  Visit Vitals  BP 97/65 (BP Location: Left arm, Patient Position: Sitting, BP Method: Medium (Automatic))   Pulse (!) 115   Temp 99 °F (37.2 °C) (Oral)   Resp 20   Ht 5' 2" (1.575 m)   Wt 50.9 kg (112 lb 3.2 oz)   LMP 03/25/2024 (Exact Date)   SpO2 99%   BMI 20.52 kg/m²       @czpe@    Current Outpatient Medications   Medication Instructions    prenatal vit/iron fum/folic ac (RIGHT STEP PRENATAL VITAMINS ORAL) Oral        All medications, laboratory studies, cardiac diagnostic imaging independently reviewed.     Lab Results   Component Value Date    LDL 54.00 04/30/2024    TRIG 46 04/30/2024    CREATININE 0.61 04/30/2024    MG 1.50 (L) 01/10/2024    K 3.9 04/30/2024        ASSESSMENT/PLAN:    Likely Postural tachycardia Syndrome (POTS)  - EKG w/ persistent sinus tachycardia 1/16/24  - TTE on 1/9/24 - EF 63% without any other structural abnormalities  - orthostatic BP in clinic 1/16/24              - laying 116/75, HR 91              - sitting 106/72,                - standing 100/73,   - educated on: POTS diagnosis, increasing fluid intake and wearing compression stockings   - also encouraged exercise as tolerated     There are no diagnoses linked to this encounter.     Rikki Feldman MS-3  [facility] [facility] [place]      "

## 2024-07-24 NOTE — PROGRESS NOTES
"HPI  24 y.o.  at 16w0d here for OB check.  C/o hemorrhoids.         ROS  Review of Systems   Constitutional:  Negative for chills and fever.   Gastrointestinal:  Negative for abdominal pain, constipation and diarrhea.   Genitourinary:  Negative for flank pain, genital sores, pelvic pain, urgency, vaginal bleeding, vaginal discharge, vaginal pain, postcoital bleeding and vaginal odor.         OBJECTIVE  BP (!) 108/58   Temp 98.2 °F (36.8 °C)   Ht 5' 2" (1.575 m)   Wt 51.3 kg (113 lb)   LMP 2024 (Exact Date)   BMI 20.67 kg/m²     Gen: No distress  Abdomen: Gravid, non-tender  Extremities: No edema    FHT: 160    ASSESSMENT  1. Anxiety during pregnancy    2. 16 weeks gestation of pregnancy  - POCT Urinalysis, Dipstick, Automated, W/O Scope  - US OB 14+ Wks, TransAbd, Single Gestation; Future    3. POTS (postural orthostatic tachycardia syndrome)    4. History of gestational diabetes in prior pregnancy, currently pregnant in first trimester    5. Hemorrhoids during pregnancy in second trimester        PLAN  Reviewed standard labor unit and kick count precautions.  Discussed pre-eclampsia precautions.  Discussed COVID-19 risks, social distancing, and isolation if respiratory symptoms develop.     Anatomy scan to be done around 20 weeks gestation. EVIE 25     Stool softeners, PO Hydration  Consider preparation H for pain/itching  If no relief, will given anusol supp.    RTC 4 weeks ob check     This note was transcribed by Geena Weber. There may be transcription errors as a result, however minimal. Effort has been made to ensure accuracy of transcription, but any obvious errors or omissions should be clarified with the author of the document.       "

## 2024-07-29 ENCOUNTER — ROUTINE PRENATAL (OUTPATIENT)
Dept: OBSTETRICS AND GYNECOLOGY | Facility: CLINIC | Age: 24
End: 2024-07-29
Payer: MEDICAID

## 2024-07-29 ENCOUNTER — LAB VISIT (OUTPATIENT)
Dept: LAB | Facility: HOSPITAL | Age: 24
End: 2024-07-29
Attending: OBSTETRICS & GYNECOLOGY
Payer: MEDICAID

## 2024-07-29 ENCOUNTER — PATIENT MESSAGE (OUTPATIENT)
Dept: OTHER | Facility: OTHER | Age: 24
End: 2024-07-29
Payer: MEDICAID

## 2024-07-29 VITALS
SYSTOLIC BLOOD PRESSURE: 108 MMHG | TEMPERATURE: 98 F | WEIGHT: 113 LBS | HEIGHT: 62 IN | BODY MASS INDEX: 20.8 KG/M2 | DIASTOLIC BLOOD PRESSURE: 58 MMHG

## 2024-07-29 DIAGNOSIS — Z36.1 NEED FOR MATERNAL SERUM ALPHA-PROTEIN (MSAFP) SCREENING: Primary | ICD-10-CM

## 2024-07-29 DIAGNOSIS — F41.9 ANXIETY DURING PREGNANCY: Primary | ICD-10-CM

## 2024-07-29 DIAGNOSIS — O22.42 HEMORRHOIDS DURING PREGNANCY IN SECOND TRIMESTER: ICD-10-CM

## 2024-07-29 DIAGNOSIS — G90.A POTS (POSTURAL ORTHOSTATIC TACHYCARDIA SYNDROME): ICD-10-CM

## 2024-07-29 DIAGNOSIS — Z3A.16 16 WEEKS GESTATION OF PREGNANCY: ICD-10-CM

## 2024-07-29 DIAGNOSIS — O09.291 HISTORY OF GESTATIONAL DIABETES IN PRIOR PREGNANCY, CURRENTLY PREGNANT IN FIRST TRIMESTER: ICD-10-CM

## 2024-07-29 DIAGNOSIS — Z86.32 HISTORY OF GESTATIONAL DIABETES IN PRIOR PREGNANCY, CURRENTLY PREGNANT IN FIRST TRIMESTER: ICD-10-CM

## 2024-07-29 DIAGNOSIS — O99.340 ANXIETY DURING PREGNANCY: Primary | ICD-10-CM

## 2024-07-29 LAB
BILIRUB UR QL STRIP: ABNORMAL
GLUCOSE UR QL STRIP: NEGATIVE
KETONES UR QL STRIP: ABNORMAL
LEUKOCYTE ESTERASE UR QL STRIP: NEGATIVE
PH, POC UA: ABNORMAL
POC BLOOD, URINE: ABNORMAL
POC NITRATES, URINE: NEGATIVE
PROT UR QL STRIP: POSITIVE
SP GR UR STRIP: ABNORMAL (ref 1–1.03)
UROBILINOGEN UR STRIP-ACNC: ABNORMAL (ref 0.3–2.2)

## 2024-07-29 PROCEDURE — 99213 OFFICE O/P EST LOW 20 MIN: CPT | Mod: TH,,, | Performed by: OBSTETRICS & GYNECOLOGY

## 2024-07-29 PROCEDURE — 82105 ALPHA-FETOPROTEIN SERUM: CPT

## 2024-07-30 LAB — MAYO GENERIC ORDERABLE RESULT: NORMAL

## 2024-08-05 ENCOUNTER — PATIENT MESSAGE (OUTPATIENT)
Dept: OTHER | Facility: OTHER | Age: 24
End: 2024-08-05
Payer: MEDICAID

## 2024-08-26 ENCOUNTER — PATIENT MESSAGE (OUTPATIENT)
Dept: OTHER | Facility: OTHER | Age: 24
End: 2024-08-26
Payer: MEDICAID

## 2024-08-26 ENCOUNTER — ROUTINE PRENATAL (OUTPATIENT)
Dept: OBSTETRICS AND GYNECOLOGY | Facility: CLINIC | Age: 24
End: 2024-08-26
Payer: MEDICAID

## 2024-08-26 ENCOUNTER — HOSPITAL ENCOUNTER (OUTPATIENT)
Dept: RADIOLOGY | Facility: HOSPITAL | Age: 24
Discharge: HOME | End: 2024-08-26
Attending: OBSTETRICS & GYNECOLOGY
Payer: MEDICAID

## 2024-08-26 VITALS — BODY MASS INDEX: 21.4 KG/M2 | DIASTOLIC BLOOD PRESSURE: 54 MMHG | SYSTOLIC BLOOD PRESSURE: 90 MMHG | WEIGHT: 117 LBS

## 2024-08-26 DIAGNOSIS — Z3A.16 16 WEEKS GESTATION OF PREGNANCY: ICD-10-CM

## 2024-08-26 DIAGNOSIS — G90.A POTS (POSTURAL ORTHOSTATIC TACHYCARDIA SYNDROME): ICD-10-CM

## 2024-08-26 DIAGNOSIS — O99.340 ANXIETY DURING PREGNANCY: Primary | ICD-10-CM

## 2024-08-26 DIAGNOSIS — Z86.32 HISTORY OF GESTATIONAL DIABETES IN PRIOR PREGNANCY, CURRENTLY PREGNANT IN FIRST TRIMESTER: ICD-10-CM

## 2024-08-26 DIAGNOSIS — Z3A.20 20 WEEKS GESTATION OF PREGNANCY: ICD-10-CM

## 2024-08-26 DIAGNOSIS — O09.291 HISTORY OF GESTATIONAL DIABETES IN PRIOR PREGNANCY, CURRENTLY PREGNANT IN FIRST TRIMESTER: ICD-10-CM

## 2024-08-26 DIAGNOSIS — F41.9 ANXIETY DURING PREGNANCY: Primary | ICD-10-CM

## 2024-08-26 LAB
BILIRUB UR QL STRIP: NORMAL
GLUCOSE UR QL STRIP: NEGATIVE
KETONES UR QL STRIP: NORMAL
LEUKOCYTE ESTERASE UR QL STRIP: NEGATIVE
PH, POC UA: NORMAL
POC BLOOD, URINE: NORMAL
POC NITRATES, URINE: NEGATIVE
PROT UR QL STRIP: NEGATIVE
SP GR UR STRIP: NORMAL (ref 1–1.03)
UROBILINOGEN UR STRIP-ACNC: NORMAL (ref 0.3–2.2)

## 2024-08-26 PROCEDURE — 99213 OFFICE O/P EST LOW 20 MIN: CPT | Mod: TH,,,

## 2024-08-26 PROCEDURE — 76805 OB US >/= 14 WKS SNGL FETUS: CPT | Mod: TC

## 2024-08-26 NOTE — PROGRESS NOTES
HPI  24 y.o.  at 20w0d here for OB check.    ROS  Review of Systems   Constitutional:  Negative for chills and fever.   Gastrointestinal:  Negative for abdominal pain, constipation and diarrhea.   Genitourinary:  Negative for flank pain, genital sores, pelvic pain, urgency, vaginal bleeding, vaginal discharge, vaginal pain, postcoital bleeding and vaginal odor.         OBJECTIVE  BP (!) 90/54   Wt 53.1 kg (117 lb)   LMP 2024 (Exact Date)   BMI 21.40 kg/m²     Gen: No distress  Abdomen: Gravid, non-tender  Extremities: No edema    FHT: 150    ASSESSMENT  1. Anxiety during pregnancy    2. 20 weeks gestation of pregnancy  - POCT Urinalysis, Dipstick, Automated, W/O Scope    3. History of gestational diabetes in prior pregnancy, currently pregnant in first trimester    4. POTS (postural orthostatic tachycardia syndrome)        PLAN  Reviewed standard labor unit and kick count precautions.  Discussed pre-eclampsia precautions.  Discussed COVID-19 risks, social distancing, and isolation if respiratory symptoms develop.     Anatomy scan scheduled for today at 1530.      RTC 4 weeks ob check     This note was transcribed by Geena Weber. There may be transcription errors as a result, however minimal. Effort has been made to ensure accuracy of transcription, but any obvious errors or omissions should be clarified with the author of the document.       I agree with the above documentation.

## 2024-09-23 ENCOUNTER — PATIENT MESSAGE (OUTPATIENT)
Dept: OTHER | Facility: OTHER | Age: 24
End: 2024-09-23
Payer: MEDICAID

## 2024-09-23 NOTE — PROGRESS NOTES
HPI  24 y.o.  at 24w1d here for OB check. C/o pelvic pain, pressure for 3 weeks.  She also reports abnormal discharge with odor for the last few days.   Reports positive fetal movement. denies LOF, CTX.       ROS  Review of Systems   Constitutional:  Negative for chills and fever.   Gastrointestinal:  Negative for abdominal pain, constipation and diarrhea.   Genitourinary:  Positive for pelvic pain. Negative for flank pain, genital sores, urgency, vaginal bleeding, vaginal discharge, vaginal pain, postcoital bleeding and vaginal odor.         OBJECTIVE  BP 98/64   Wt 55.8 kg (123 lb)   LMP 2024 (Exact Date)   BMI 22.50 kg/m²     General Exam:    General Appearance: alert, in no acute distress, normal, well nourished.  Psych:  Orientation: time, place, person.  Mood/Affect: Normal   Abdomen:  - Soft. Non-tender. No rebound tenderness or guardingNo masses. Liver normal. Spleen normal. No hernia palpable.  Pelvis:   - Vulva: Normal   - Perianal skin: Normal   - Urethra: Normal meatus. Q-tip: Not performed   - Vagina: normal white vaginal discharge present  - Cervix: Normal. Cervical motion tenderness Absent.  Cl/TH/Hi  - Uterus: . Mobile. Non-tender.   - Adnexal: Normal    TVUS:   CL 3.3cm  FHT: 130      ASSESSMENT  1. Pelvic pain affecting pregnancy in second trimester, antepartum  - Trichomonas vaginalis Amplified RNA  - C.trach/N.gonor AMP RNA    2. 24 weeks gestation of pregnancy  - POCT Urinalysis, Dipstick, Automated, W/O Scope  - US OB/GYN Procedure ([facility]) - Extended List    3. Anxiety during pregnancy    4. POTS (postural orthostatic tachycardia syndrome)    5. History of gestational diabetes in prior pregnancy, currently pregnant in second trimester    6. Hemorrhoids during pregnancy in second trimester    7. Vaginal discharge during pregnancy in second trimester        PLAN  Reviewed standard labor unit and kick count precautions.  Discussed pre-eclampsia precautions.  Discussed COVID-19  risks, social distancing, and isolation if respiratory symptoms develop.     CBC RPR O'FERREIRA     CL wnl. Exam normal. Pt reassured.   PO hydration, Tylenol as needed for pain. Strict precautions     Anatomy scan WNL     RTC 4 weeks ob check     This note was transcribed by Geena Weber. There may be transcription errors as a result, however minimal. Effort has been made to ensure accuracy of transcription, but any obvious errors or omissions should be clarified with the author of the document.       I agree with the above documentation.

## 2024-09-24 ENCOUNTER — ROUTINE PRENATAL (OUTPATIENT)
Dept: OBSTETRICS AND GYNECOLOGY | Facility: CLINIC | Age: 24
End: 2024-09-24
Payer: MEDICAID

## 2024-09-24 VITALS — WEIGHT: 123 LBS | SYSTOLIC BLOOD PRESSURE: 98 MMHG | BODY MASS INDEX: 22.5 KG/M2 | DIASTOLIC BLOOD PRESSURE: 64 MMHG

## 2024-09-24 DIAGNOSIS — O26.892 VAGINAL DISCHARGE DURING PREGNANCY IN SECOND TRIMESTER: ICD-10-CM

## 2024-09-24 DIAGNOSIS — O22.42 HEMORRHOIDS DURING PREGNANCY IN SECOND TRIMESTER: ICD-10-CM

## 2024-09-24 DIAGNOSIS — O26.892 PELVIC PAIN AFFECTING PREGNANCY IN SECOND TRIMESTER, ANTEPARTUM: Primary | ICD-10-CM

## 2024-09-24 DIAGNOSIS — Z86.32 HISTORY OF GESTATIONAL DIABETES IN PRIOR PREGNANCY, CURRENTLY PREGNANT IN SECOND TRIMESTER: ICD-10-CM

## 2024-09-24 DIAGNOSIS — F41.9 ANXIETY DURING PREGNANCY: ICD-10-CM

## 2024-09-24 DIAGNOSIS — Z3A.24 24 WEEKS GESTATION OF PREGNANCY: ICD-10-CM

## 2024-09-24 DIAGNOSIS — O09.292 HISTORY OF GESTATIONAL DIABETES IN PRIOR PREGNANCY, CURRENTLY PREGNANT IN SECOND TRIMESTER: ICD-10-CM

## 2024-09-24 DIAGNOSIS — N89.8 VAGINAL DISCHARGE DURING PREGNANCY IN SECOND TRIMESTER: ICD-10-CM

## 2024-09-24 DIAGNOSIS — O99.340 ANXIETY DURING PREGNANCY: ICD-10-CM

## 2024-09-24 DIAGNOSIS — G90.A POTS (POSTURAL ORTHOSTATIC TACHYCARDIA SYNDROME): ICD-10-CM

## 2024-09-24 DIAGNOSIS — R10.2 PELVIC PAIN AFFECTING PREGNANCY IN SECOND TRIMESTER, ANTEPARTUM: Primary | ICD-10-CM

## 2024-09-24 PROCEDURE — 87661 TRICHOMONAS VAGINALIS AMPLIF: CPT | Performed by: OBSTETRICS & GYNECOLOGY

## 2024-09-24 PROCEDURE — 87591 N.GONORRHOEAE DNA AMP PROB: CPT | Performed by: OBSTETRICS & GYNECOLOGY

## 2024-09-26 LAB
C TRACH RRNA SPEC QL NAA+PROBE: NEGATIVE
N GONORRHOEA RRNA SPEC QL NAA+PROBE: NEGATIVE
SPECIMEN SOURCE: NORMAL
T VAGINALIS RRNA SPEC QL NAA+PROBE: NEGATIVE

## 2024-10-07 ENCOUNTER — PATIENT MESSAGE (OUTPATIENT)
Dept: OTHER | Facility: OTHER | Age: 24
End: 2024-10-07
Payer: MEDICAID

## 2024-10-21 ENCOUNTER — PATIENT MESSAGE (OUTPATIENT)
Dept: OTHER | Facility: OTHER | Age: 24
End: 2024-10-21
Payer: MEDICAID

## 2024-11-04 ENCOUNTER — PATIENT MESSAGE (OUTPATIENT)
Dept: OTHER | Facility: OTHER | Age: 24
End: 2024-11-04
Payer: MEDICAID

## 2024-11-04 NOTE — PROGRESS NOTES
HPI  24 y.o.  at 30w1d here for OB check.  Reports positive fetal movement. denies LOF, CTX.     O'Castaneda: 165  RPR: in process  H&H: 10.9/31.5        ROS  Review of Systems   Constitutional:  Negative for chills and fever.   Gastrointestinal:  Negative for abdominal pain, constipation and diarrhea.   Genitourinary:  Negative for flank pain, genital sores, pelvic pain, urgency, vaginal bleeding, vaginal discharge, vaginal pain, postcoital bleeding and vaginal odor.         OBJECTIVE  /60 (BP Location: Right arm, Patient Position: Sitting)   Wt 58.9 kg (129 lb 12.8 oz)   LMP 2024 (Exact Date)   BMI 23.74 kg/m²     Gen: No distress  Abdomen: Gravid, non-tender  Extremities: No edema    FHT: 140  FH: 29 cm    ASSESSMENT  1. Abnormal O'Castaneda glucose challenge test, antepartum  - Glucose Tolerance, 3 Hours OB; Future    2. 30 weeks gestation of pregnancy  - POCT Urinalysis, Dipstick, Automated, W/O Scope    3. Anxiety during pregnancy    4. POTS (postural orthostatic tachycardia syndrome)    5. History of gestational diabetes in prior pregnancy, currently pregnant in third trimester    6. Hemorrhoids during pregnancy in third trimester        PLAN  Reviewed standard labor unit and kick count precautions.  Discussed pre-eclampsia precautions.  Discussed COVID-19 risks, social distancing, and isolation if respiratory symptoms develop.     3 HOUR GTT to be collected. If abnormal, will send Glucometer to pharmacy.     RTC 2 wks ob check     This note was transcribed by Geena Weber. There may be transcription errors as a result, however minimal. Effort has been made to ensure accuracy of transcription, but any obvious errors or omissions should be clarified with the author of the document.       I agree with the above documentation.

## 2024-11-05 ENCOUNTER — ROUTINE PRENATAL (OUTPATIENT)
Dept: OBSTETRICS AND GYNECOLOGY | Facility: CLINIC | Age: 24
End: 2024-11-05
Payer: MEDICAID

## 2024-11-05 ENCOUNTER — LAB VISIT (OUTPATIENT)
Dept: LAB | Facility: HOSPITAL | Age: 24
End: 2024-11-05
Attending: OBSTETRICS & GYNECOLOGY
Payer: MEDICAID

## 2024-11-05 VITALS
DIASTOLIC BLOOD PRESSURE: 60 MMHG | BODY MASS INDEX: 23.74 KG/M2 | WEIGHT: 129.81 LBS | SYSTOLIC BLOOD PRESSURE: 106 MMHG

## 2024-11-05 DIAGNOSIS — Z86.32 HISTORY OF GESTATIONAL DIABETES IN PRIOR PREGNANCY, CURRENTLY PREGNANT IN THIRD TRIMESTER: ICD-10-CM

## 2024-11-05 DIAGNOSIS — O09.293 HISTORY OF GESTATIONAL DIABETES IN PRIOR PREGNANCY, CURRENTLY PREGNANT IN THIRD TRIMESTER: ICD-10-CM

## 2024-11-05 DIAGNOSIS — O99.340 ANXIETY DURING PREGNANCY: ICD-10-CM

## 2024-11-05 DIAGNOSIS — G90.A POTS (POSTURAL ORTHOSTATIC TACHYCARDIA SYNDROME): ICD-10-CM

## 2024-11-05 DIAGNOSIS — F41.9 ANXIETY DURING PREGNANCY: ICD-10-CM

## 2024-11-05 DIAGNOSIS — O99.810 ABNORMAL O'SULLIVAN GLUCOSE CHALLENGE TEST, ANTEPARTUM: Primary | ICD-10-CM

## 2024-11-05 DIAGNOSIS — Z3A.30 30 WEEKS GESTATION OF PREGNANCY: ICD-10-CM

## 2024-11-05 DIAGNOSIS — O22.43 HEMORRHOIDS DURING PREGNANCY IN THIRD TRIMESTER: ICD-10-CM

## 2024-11-05 DIAGNOSIS — Z3A.24 24 WEEKS GESTATION OF PREGNANCY: ICD-10-CM

## 2024-11-05 LAB
BILIRUB UR QL STRIP: ABNORMAL
ERYTHROCYTE [DISTWIDTH] IN BLOOD BY AUTOMATED COUNT: 13.8 % (ref 11–14.5)
GLUCOSE 1H P 100 G GLC PO SERPL-MCNC: 165 MG/DL (ref 100–180)
GLUCOSE UR QL STRIP: POSITIVE
HCT VFR BLD AUTO: 31.5 % (ref 36–48)
HGB BLD-MCNC: 10.9 G/DL (ref 11.8–16)
KETONES UR QL STRIP: ABNORMAL
LEUKOCYTE ESTERASE UR QL STRIP: NEGATIVE
MCH RBC QN AUTO: 31.6 PG (ref 27–34)
MCHC RBC AUTO-ENTMCNC: 34.6 G/DL (ref 31–37)
MCV RBC AUTO: 91.3 FL (ref 79–99)
NRBC BLD AUTO-RTO: 0 %
PH, POC UA: ABNORMAL
PLATELET # BLD AUTO: 160 X10(3)/MCL (ref 140–371)
PMV BLD AUTO: 9.8 FL (ref 9.4–12.4)
POC BLOOD, URINE: ABNORMAL
POC NITRATES, URINE: NEGATIVE
PROT UR QL STRIP: POSITIVE
RBC # BLD AUTO: 3.45 X10(6)/MCL (ref 4–5.1)
SP GR UR STRIP: ABNORMAL (ref 1–1.03)
T PALLIDUM AB SER QL: NONREACTIVE
UROBILINOGEN UR STRIP-ACNC: ABNORMAL (ref 0.3–2.2)
WBC # BLD AUTO: 11.1 X10(3)/MCL (ref 4–11.5)

## 2024-11-05 PROCEDURE — 86780 TREPONEMA PALLIDUM: CPT

## 2024-11-05 PROCEDURE — 85027 COMPLETE CBC AUTOMATED: CPT

## 2024-11-05 PROCEDURE — 82950 GLUCOSE TEST: CPT

## 2024-11-05 PROCEDURE — 36415 COLL VENOUS BLD VENIPUNCTURE: CPT

## 2024-11-05 PROCEDURE — 99213 OFFICE O/P EST LOW 20 MIN: CPT | Mod: TH,,, | Performed by: OBSTETRICS & GYNECOLOGY

## 2024-11-07 ENCOUNTER — LAB VISIT (OUTPATIENT)
Dept: LAB | Facility: HOSPITAL | Age: 24
End: 2024-11-07
Attending: OBSTETRICS & GYNECOLOGY
Payer: MEDICAID

## 2024-11-07 ENCOUNTER — TELEPHONE (OUTPATIENT)
Dept: OBSTETRICS AND GYNECOLOGY | Facility: CLINIC | Age: 24
End: 2024-11-07
Payer: MEDICAID

## 2024-11-07 DIAGNOSIS — O99.810 ABNORMAL O'SULLIVAN GLUCOSE CHALLENGE TEST, ANTEPARTUM: ICD-10-CM

## 2024-11-07 LAB
GLUCOSE 1H P 100 G GLC PO SERPL-MCNC: 178 MG/DL (ref 100–180)
GLUCOSE 2H P 100 G GLC PO SERPL-MCNC: 168 MG/DL
GLUCOSE 3H P 100 G GLC PO SERPL-MCNC: 137 MG/DL
GLUCOSE P FAST SERPL-MCNC: 91 MG/DL (ref 70–115)
POCT GLUCOSE: 88 MG/DL (ref 70–110)

## 2024-11-07 PROCEDURE — 82951 GLUCOSE TOLERANCE TEST (GTT): CPT

## 2024-11-07 PROCEDURE — 82947 ASSAY GLUCOSE BLOOD QUANT: CPT

## 2024-11-07 PROCEDURE — 36415 COLL VENOUS BLD VENIPUNCTURE: CPT

## 2024-11-07 PROCEDURE — 82950 GLUCOSE TEST: CPT

## 2024-11-07 NOTE — TELEPHONE ENCOUNTER
----- Message from Dk Garrett MD sent at 11/7/2024  2:16 PM CST -----  Trend towards insulin resistance, but only one abnormal value.  Recommend DM diet, 2200kcal and 35%carbs.

## 2024-11-13 NOTE — PROGRESS NOTES
HPI  24 y.o.  at 32w1d here for OB check.  Reports positive fetal movement. denies LOF, CTX.     3 hour GTT:  Fastin  1 hour: 178  2 hour: 168  3 hour:  137      ROS  Review of Systems   Constitutional:  Negative for chills and fever.   Gastrointestinal:  Negative for abdominal pain, constipation and diarrhea.   Genitourinary:  Negative for flank pain, genital sores, pelvic pain, urgency, vaginal bleeding, vaginal discharge, vaginal pain, postcoital bleeding and vaginal odor.         OBJECTIVE  /60   Wt 59.2 kg (130 lb 9.6 oz)   LMP 2024 (Exact Date)   BMI 23.89 kg/m²     Gen: No distress  Abdomen: Gravid, non-tender  Extremities: No edema    FHT: 130  FH: 30 cm      ASSESSMENT  1. Abnormal O'Castaneda glucose challenge test, antepartum  - POCT Urinalysis, Dipstick, Automated, W/O Scope    2. 32 weeks gestation of pregnancy  - POCT Urinalysis, Dipstick, Automated, W/O Scope    3. Anxiety during pregnancy  - POCT Urinalysis, Dipstick, Automated, W/O Scope    4. POTS (postural orthostatic tachycardia syndrome)  - POCT Urinalysis, Dipstick, Automated, W/O Scope    5. History of gestational diabetes in prior pregnancy, currently pregnant in third trimester  - POCT Urinalysis, Dipstick, Automated, W/O Scope        PLAN  Reviewed standard labor unit and kick count precautions.  Discussed pre-eclampsia precautions.  Discussed COVID-19 risks, social distancing, and isolation if respiratory symptoms develop.     Growth US on RTC for borderline low FH and low maternal weight gain.  (15lb)    RTC 2 weeks ob check and growth US    This note was transcribed by Geena Weber. There may be transcription errors as a result, however minimal. Effort has been made to ensure accuracy of transcription, but any obvious errors or omissions should be clarified with the author of the document.       I agree with the above documentation.

## 2024-11-18 ENCOUNTER — PATIENT MESSAGE (OUTPATIENT)
Dept: OTHER | Facility: OTHER | Age: 24
End: 2024-11-18
Payer: MEDICAID

## 2024-11-19 ENCOUNTER — ROUTINE PRENATAL (OUTPATIENT)
Dept: OBSTETRICS AND GYNECOLOGY | Facility: CLINIC | Age: 24
End: 2024-11-19
Payer: MEDICAID

## 2024-11-19 VITALS
SYSTOLIC BLOOD PRESSURE: 112 MMHG | BODY MASS INDEX: 23.89 KG/M2 | DIASTOLIC BLOOD PRESSURE: 60 MMHG | WEIGHT: 130.63 LBS

## 2024-11-19 DIAGNOSIS — O99.810 ABNORMAL O'SULLIVAN GLUCOSE CHALLENGE TEST, ANTEPARTUM: Primary | ICD-10-CM

## 2024-11-19 DIAGNOSIS — F41.9 ANXIETY DURING PREGNANCY: ICD-10-CM

## 2024-11-19 DIAGNOSIS — G90.A POTS (POSTURAL ORTHOSTATIC TACHYCARDIA SYNDROME): ICD-10-CM

## 2024-11-19 DIAGNOSIS — O09.293 HISTORY OF GESTATIONAL DIABETES IN PRIOR PREGNANCY, CURRENTLY PREGNANT IN THIRD TRIMESTER: ICD-10-CM

## 2024-11-19 DIAGNOSIS — O99.340 ANXIETY DURING PREGNANCY: ICD-10-CM

## 2024-11-19 DIAGNOSIS — Z3A.32 32 WEEKS GESTATION OF PREGNANCY: ICD-10-CM

## 2024-11-19 DIAGNOSIS — Z86.32 HISTORY OF GESTATIONAL DIABETES IN PRIOR PREGNANCY, CURRENTLY PREGNANT IN THIRD TRIMESTER: ICD-10-CM

## 2024-11-19 PROCEDURE — 99213 OFFICE O/P EST LOW 20 MIN: CPT | Mod: TH,,, | Performed by: OBSTETRICS & GYNECOLOGY

## 2024-12-02 NOTE — PROGRESS NOTES
HPI  24 y.o.  at 34w1d here for OB check, growth scan for borderline low FH and low maternal weight gain, (15lb) .  Reports positive fetal movement. denies LOF, CTX.       ROS  Review of Systems   Constitutional:  Negative for chills and fever.   Gastrointestinal:  Negative for abdominal pain, constipation and diarrhea.   Genitourinary:  Negative for flank pain, genital sores, pelvic pain, urgency, vaginal bleeding, vaginal discharge, vaginal pain, postcoital bleeding and vaginal odor.         OBJECTIVE  /68   Wt 60.8 kg (134 lb)   LMP 2024 (Exact Date)   BMI 24.51 kg/m²     Gen: No distress  Abdomen: Gravid, non-tender  Extremities: No edema    FHT: 136    BPD: 34w6d  HC: 33w5d  AC: 34w4d  FL: 35w4d  EFW: 2535g, 39%tile    BPP: 88  LUIS ANGEL: 17.0    Presentation: vertex       ASSESSMENT  1. Low maternal weight gain, third trimester    2. 34 weeks gestation of pregnancy  - POCT Urinalysis, Dipstick, Automated, W/O Scope  - US OB/GYN Procedure ([facility]) - Extended List; Future  - US OB/GYN Procedure ([facility]) - Extended List    3. Abnormal O'Castaneda glucose challenge test, antepartum  - POCT Urinalysis, Dipstick, Automated, W/O Scope  - US OB/GYN Procedure ([facility]) - Extended List; Future  - US OB/GYN Procedure ([facility]) - Extended List    4. POTS (postural orthostatic tachycardia syndrome)  - POCT Urinalysis, Dipstick, Automated, W/O Scope  - US OB/GYN Procedure ([facility]) - Extended List; Future  - US OB/GYN Procedure ([facility]) - Extended List    5. Anxiety during pregnancy    6. Fundal height low for dates in third trimester        PLAN  Reviewed standard labor unit and kick count precautions.  Discussed pre-eclampsia precautions.  Discussed COVID-19 risks, social distancing, and isolation if respiratory symptoms develop.     Normal growth    RTC 2 weeks pre admit     This note was transcribed by Geena Weber. There may be transcription errors as a result, however minimal. Effort  has been made to ensure accuracy of transcription, but any obvious errors or omissions should be clarified with the author of the document.       I agree with the above documentation.   .

## 2024-12-03 ENCOUNTER — ROUTINE PRENATAL (OUTPATIENT)
Dept: OBSTETRICS AND GYNECOLOGY | Facility: CLINIC | Age: 24
End: 2024-12-03
Payer: MEDICAID

## 2024-12-03 VITALS — WEIGHT: 134 LBS | DIASTOLIC BLOOD PRESSURE: 68 MMHG | BODY MASS INDEX: 24.51 KG/M2 | SYSTOLIC BLOOD PRESSURE: 116 MMHG

## 2024-12-03 DIAGNOSIS — O26.843 FUNDAL HEIGHT LOW FOR DATES IN THIRD TRIMESTER: ICD-10-CM

## 2024-12-03 DIAGNOSIS — G90.A POTS (POSTURAL ORTHOSTATIC TACHYCARDIA SYNDROME): ICD-10-CM

## 2024-12-03 DIAGNOSIS — Z3A.34 34 WEEKS GESTATION OF PREGNANCY: ICD-10-CM

## 2024-12-03 DIAGNOSIS — F41.9 ANXIETY DURING PREGNANCY: ICD-10-CM

## 2024-12-03 DIAGNOSIS — O99.340 ANXIETY DURING PREGNANCY: ICD-10-CM

## 2024-12-03 DIAGNOSIS — O99.810 ABNORMAL O'SULLIVAN GLUCOSE CHALLENGE TEST, ANTEPARTUM: ICD-10-CM

## 2024-12-03 DIAGNOSIS — O26.13 LOW MATERNAL WEIGHT GAIN, THIRD TRIMESTER: Primary | ICD-10-CM

## 2024-12-09 ENCOUNTER — PATIENT MESSAGE (OUTPATIENT)
Dept: OTHER | Facility: OTHER | Age: 24
End: 2024-12-09
Payer: MEDICAID

## 2024-12-12 NOTE — H&P (VIEW-ONLY)
History & Physical    Subjective     History of Present Illness:  Patient is a 24 y.o. female  at 36w0d EVIE 25 here for ob pre admit. Positive fetal movement. Denies LOF, CTX.     Chief Complaint   Patient presents with    Routine Prenatal Visit     36w0d OB here for pre-admit.       Review of patient's allergies indicates:   Allergen Reactions    House dust Hives, Itching, Nausea And Vomiting and Shortness Of Breath    Compazine [prochlorperazine] Other (See Comments)     States muscles tensed up       Current Outpatient Medications   Medication Sig Dispense Refill    prenatal vit/iron fum/folic ac (RIGHT STEP PRENATAL VITAMINS ORAL) Take by mouth.       No current facility-administered medications for this visit.       Past Medical History:   Diagnosis Date    Anxiety disorder, unspecified     POTS (postural orthostatic tachycardia syndrome)      Past Surgical History:   Procedure Laterality Date    TONSILECTOMY      TYMPANOSTOMY TUBE PLACEMENT       Family History   Problem Relation Name Age of Onset    Lung cancer Maternal Aunt Maya Hoskins     Uterine cancer Maternal Aunt Maya Hoskins     Endometrial cancer Maternal Aunt Maya Hoskins     Osteoarthritis Mother Lacey     Endometrial cancer Maternal Aunt Zora Vasquez     Breast cancer Neg Hx      Colon cancer Neg Hx      Ovarian cancer Neg Hx      Cervical cancer Neg Hx       Social History     Tobacco Use    Smoking status: Never     Passive exposure: Never    Smokeless tobacco: Never   Substance Use Topics    Alcohol use: Not Currently    Drug use: Never     Comment: CBD use        Review of Systems:  Review of Systems   Respiratory: Negative.     Cardiovascular: Negative.    Gastrointestinal: Negative.    Genitourinary: Negative.           Objective     Vital Signs (Most Recent)      /64   Wt 62.5 kg (137 lb 12.8 oz)   LMP 2024 (Exact Date)   BMI 25.20 kg/m²           Physical Exam:  Physical Exam  Gen: No distress  Abdomen:  Gravid, non-tender  Extremities: No edema  Cervix: 2/50/-2  FHT: 140  FH: 36 cm         Assessment and Plan   1. Anxiety during pregnancy  - Admit to Inpatient; Standing  - Full code; Standing  - Vital Signs; Standing  - Vital signs- Early Labor(0-4 cm); Standing  - Vital Signs- Active Labor (4-10 cm); Standing  - Vital Signs Post Delivery; Standing  - Check Temperature, Membranes Intact; Standing  - Check Temperature, Membranes Ruptured; Standing  - Pulse Oximetry Continous while CONTINUOUS electronic fetal Monitor in use; Standing  - Cervical Exam; Standing  - Fetal / Uterine Monitoring - Continuous; Standing  - Fetal monitoring - scalp electrode; Standing  - Insert peripheral IV; Standing  - Domínguez to Gravity; Standing  - Domínguez Catheter Care every 12 hours; Standing  - Nurse to discontinue domínguez when patient no longer meets criteria; Standing  - Post Domínguez Catheter Removal Protocol; Standing  - Perform Bladder scan; Standing  - Perform Intermittent Catheterization; Standing  - Perform Bladder Scan, post intermittent cath; Standing  - Place indwelling catheter; Standing  - Watch for excessive vaginal bleeding; Standing  - Chlorhexidine Bath; Standing  - Decrease Oxytocin; Standing  - Discontinue oxytocin; Standing  - Oxytocin Titration Resumption; Standing  - Amnioinfusion PRN recurrent variable decelerations; Standing  - Administer a 500 -1000 ml IV fluid bolus as needed for; Standing  - Assess fundal height/uterine firmness, lochia, perineum; Standing  - Check Temperature Post Delivery; Standing  - Notify Physician; Standing  - Notify OB care provider; Standing  - Notify physician of cervical change or lack of change; Standing  - Notify physician for excessive uterine activity; Standing  - Notify physician for Fetal Heart Tones consistent with Category II or Category III tracing; Standing  - Notify Pediatrician after delivery; Standing  - Notify Nursery / Level II Nursery; Standing  - Notify Nursery / Level II  Nursery; Standing  - Abdominal prep for  Section; Standing  - Chlorhexidine (CHG) 2% Wipes; Standing  - Place sequential compression device; Standing  - Chlorohexidine Gluconate Bath; Standing  - Vital signs every 15 minutes; Standing  - Saline lock IV; Standing  - Perform baseline 20 minute non stress test - proceed with other orders if reactive criteria met.; Standing  - miSOPROStol split tablet 25 mcg  - Maternal vital signs, fetal and uterine assessments after each dose and hourly; Standing  - Electronic fetal monitoring; Standing  - May ambulate 30 minutes after insertion of Misoprostol (Cytotec); Standing  - Withhold Misoprostol (Cytotec) dosing:; Standing  - Oxytocin should be delayed until at least 4 hours after the last Misoprostol (Cytotec) dose administered; Standing  - Vital signs every 15 minutes; Standing  - miSOPROStoL tablet 800 mcg  - miSOPROStoL tablet 800 mcg  - Urinalysis, Reflex to Urine Culture; Standing  - CBC with Auto Differential; Standing  - Comprehensive metabolic panel; Standing  - Type & Screen; Standing  - SYPHILIS ANTIBODY (WITH REFLEX RPR); Standing  - HIV 1/2 Ag/Ab (4th Gen); Standing  - Hepatitis B surface antigen; Standing  - RAPID HIV; Standing  - CBC auto differential; Standing  - Type & Screen; Standing  - Rubella antibody, IgG; Standing  - Hepatitis B surface antigen; Standing  - Drug Screen, Urine; Standing  - Inpatient consult to Anesthesiology; Standing  - Diet Clear Liquid; Standing    2. 36 weeks gestation of pregnancy  - POCT Urinalysis, Dipstick, Automated, W/O Scope  - C.trach/N.gonor AMP RNA  - Trichomonas vaginalis Amplified RNA  - Strep Group B by PCR    3. Abnormal O'Castaneda glucose challenge test, antepartum  - POCT Urinalysis, Dipstick, Automated, W/O Scope  - C.trach/N.gonor AMP RNA  - Trichomonas vaginalis Amplified RNA  - Strep Group B by PCR  - Admit to Inpatient; Standing  - Full code; Standing  - Vital Signs; Standing  - Vital signs- Early  Labor(0-4 cm); Standing  - Vital Signs- Active Labor (4-10 cm); Standing  - Vital Signs Post Delivery; Standing  - Check Temperature, Membranes Intact; Standing  - Check Temperature, Membranes Ruptured; Standing  - Pulse Oximetry Continous while CONTINUOUS electronic fetal Monitor in use; Standing  - Cervical Exam; Standing  - Fetal / Uterine Monitoring - Continuous; Standing  - Fetal monitoring - scalp electrode; Standing  - Insert peripheral IV; Standing  - Domínguez to Gravity; Standing  - Domínguez Catheter Care every 12 hours; Standing  - Nurse to discontinue domínguez when patient no longer meets criteria; Standing  - Post Domínguez Catheter Removal Protocol; Standing  - Perform Bladder scan; Standing  - Perform Intermittent Catheterization; Standing  - Perform Bladder Scan, post intermittent cath; Standing  - Place indwelling catheter; Standing  - Watch for excessive vaginal bleeding; Standing  - Chlorhexidine Bath; Standing  - Decrease Oxytocin; Standing  - Discontinue oxytocin; Standing  - Oxytocin Titration Resumption; Standing  - Amnioinfusion PRN recurrent variable decelerations; Standing  - Administer a 500 -1000 ml IV fluid bolus as needed for; Standing  - Assess fundal height/uterine firmness, lochia, perineum; Standing  - Check Temperature Post Delivery; Standing  - Notify Physician; Standing  - Notify OB care provider; Standing  - Notify physician of cervical change or lack of change; Standing  - Notify physician for excessive uterine activity; Standing  - Notify physician for Fetal Heart Tones consistent with Category II or Category III tracing; Standing  - Notify Pediatrician after delivery; Standing  - Notify Nursery / Level II Nursery; Standing  - Notify Nursery / Level II Nursery; Standing  - Abdominal prep for  Section; Standing  - Chlorhexidine (CHG) 2% Wipes; Standing  - Place sequential compression device; Standing  - Chlorohexidine Gluconate Bath; Standing  - Vital signs every 15 minutes;  Standing  - Saline lock IV; Standing  - Perform baseline 20 minute non stress test - proceed with other orders if reactive criteria met.; Standing  - miSOPROStol split tablet 25 mcg  - Maternal vital signs, fetal and uterine assessments after each dose and hourly; Standing  - Electronic fetal monitoring; Standing  - May ambulate 30 minutes after insertion of Misoprostol (Cytotec); Standing  - Withhold Misoprostol (Cytotec) dosing:; Standing  - Oxytocin should be delayed until at least 4 hours after the last Misoprostol (Cytotec) dose administered; Standing  - Vital signs every 15 minutes; Standing  - miSOPROStoL tablet 800 mcg  - miSOPROStoL tablet 800 mcg  - Urinalysis, Reflex to Urine Culture; Standing  - CBC with Auto Differential; Standing  - Comprehensive metabolic panel; Standing  - Type & Screen; Standing  - SYPHILIS ANTIBODY (WITH REFLEX RPR); Standing  - HIV 1/2 Ag/Ab (4th Gen); Standing  - Hepatitis B surface antigen; Standing  - RAPID HIV; Standing  - CBC auto differential; Standing  - Type & Screen; Standing  - Rubella antibody, IgG; Standing  - Hepatitis B surface antigen; Standing  - Drug Screen, Urine; Standing  - Inpatient consult to Anesthesiology; Standing  - Diet Clear Liquid; Standing    4. POTS (postural orthostatic tachycardia syndrome)  - POCT Urinalysis, Dipstick, Automated, W/O Scope  - C.trach/N.gonor AMP RNA  - Trichomonas vaginalis Amplified RNA  - Strep Group B by PCR  - Admit to Inpatient; Standing  - Full code; Standing  - Vital Signs; Standing  - Vital signs- Early Labor(0-4 cm); Standing  - Vital Signs- Active Labor (4-10 cm); Standing  - Vital Signs Post Delivery; Standing  - Check Temperature, Membranes Intact; Standing  - Check Temperature, Membranes Ruptured; Standing  - Pulse Oximetry Continous while CONTINUOUS electronic fetal Monitor in use; Standing  - Cervical Exam; Standing  - Fetal / Uterine Monitoring - Continuous; Standing  - Fetal monitoring - scalp electrode;  Standing  - Insert peripheral IV; Standing  - Domínguez to Gravity; Standing  - Domínguez Catheter Care every 12 hours; Standing  - Nurse to discontinue domínguez when patient no longer meets criteria; Standing  - Post Domínguez Catheter Removal Protocol; Standing  - Perform Bladder scan; Standing  - Perform Intermittent Catheterization; Standing  - Perform Bladder Scan, post intermittent cath; Standing  - Place indwelling catheter; Standing  - Watch for excessive vaginal bleeding; Standing  - Chlorhexidine Bath; Standing  - Decrease Oxytocin; Standing  - Discontinue oxytocin; Standing  - Oxytocin Titration Resumption; Standing  - Amnioinfusion PRN recurrent variable decelerations; Standing  - Administer a 500 -1000 ml IV fluid bolus as needed for; Standing  - Assess fundal height/uterine firmness, lochia, perineum; Standing  - Check Temperature Post Delivery; Standing  - Notify Physician; Standing  - Notify OB care provider; Standing  - Notify physician of cervical change or lack of change; Standing  - Notify physician for excessive uterine activity; Standing  - Notify physician for Fetal Heart Tones consistent with Category II or Category III tracing; Standing  - Notify Pediatrician after delivery; Standing  - Notify Nursery / Level II Nursery; Standing  - Notify Nursery / Level II Nursery; Standing  - Abdominal prep for  Section; Standing  - Chlorhexidine (CHG) 2% Wipes; Standing  - Place sequential compression device; Standing  - Chlorohexidine Gluconate Bath; Standing  - Vital signs every 15 minutes; Standing  - Saline lock IV; Standing  - Perform baseline 20 minute non stress test - proceed with other orders if reactive criteria met.; Standing  - miSOPROStol split tablet 25 mcg  - Maternal vital signs, fetal and uterine assessments after each dose and hourly; Standing  - Electronic fetal monitoring; Standing  - May ambulate 30 minutes after insertion of Misoprostol (Cytotec); Standing  - Withhold Misoprostol (Cytotec)  dosing:; Standing  - Oxytocin should be delayed until at least 4 hours after the last Misoprostol (Cytotec) dose administered; Standing  - Vital signs every 15 minutes; Standing  - miSOPROStoL tablet 800 mcg  - miSOPROStoL tablet 800 mcg  - Urinalysis, Reflex to Urine Culture; Standing  - CBC with Auto Differential; Standing  - Comprehensive metabolic panel; Standing  - Type & Screen; Standing  - SYPHILIS ANTIBODY (WITH REFLEX RPR); Standing  - HIV 1/2 Ag/Ab (4th Gen); Standing  - Hepatitis B surface antigen; Standing  - RAPID HIV; Standing  - CBC auto differential; Standing  - Type & Screen; Standing  - Rubella antibody, IgG; Standing  - Hepatitis B surface antigen; Standing  - Drug Screen, Urine; Standing  - Inpatient consult to Anesthesiology; Standing  - Diet Clear Liquid; Standing    5. Fundal height low for dates in third trimester  - POCT Urinalysis, Dipstick, Automated, W/O Scope  - C.trach/N.gonor AMP RNA  - Trichomonas vaginalis Amplified RNA  - Strep Group B by PCR    6. History of gestational diabetes in prior pregnancy, currently pregnant in third trimester  - POCT Urinalysis, Dipstick, Automated, W/O Scope  - C.trach/N.gonor AMP RNA  - Trichomonas vaginalis Amplified RNA  - Strep Group B by PCR  - Admit to Inpatient; Standing  - Full code; Standing  - Vital Signs; Standing  - Vital signs- Early Labor(0-4 cm); Standing  - Vital Signs- Active Labor (4-10 cm); Standing  - Vital Signs Post Delivery; Standing  - Check Temperature, Membranes Intact; Standing  - Check Temperature, Membranes Ruptured; Standing  - Pulse Oximetry Continous while CONTINUOUS electronic fetal Monitor in use; Standing  - Cervical Exam; Standing  - Fetal / Uterine Monitoring - Continuous; Standing  - Fetal monitoring - scalp electrode; Standing  - Insert peripheral IV; Standing  - Domínguez to Gravity; Standing  - Domínguez Catheter Care every 12 hours; Standing  - Nurse to discontinue domínguez when patient no longer meets criteria; Standing  -  Post Coleman Catheter Removal Protocol; Standing  - Perform Bladder scan; Standing  - Perform Intermittent Catheterization; Standing  - Perform Bladder Scan, post intermittent cath; Standing  - Place indwelling catheter; Standing  - Watch for excessive vaginal bleeding; Standing  - Chlorhexidine Bath; Standing  - Decrease Oxytocin; Standing  - Discontinue oxytocin; Standing  - Oxytocin Titration Resumption; Standing  - Amnioinfusion PRN recurrent variable decelerations; Standing  - Administer a 500 -1000 ml IV fluid bolus as needed for; Standing  - Assess fundal height/uterine firmness, lochia, perineum; Standing  - Check Temperature Post Delivery; Standing  - Notify Physician; Standing  - Notify OB care provider; Standing  - Notify physician of cervical change or lack of change; Standing  - Notify physician for excessive uterine activity; Standing  - Notify physician for Fetal Heart Tones consistent with Category II or Category III tracing; Standing  - Notify Pediatrician after delivery; Standing  - Notify Nursery / Level II Nursery; Standing  - Notify Nursery / Level II Nursery; Standing  - Abdominal prep for  Section; Standing  - Chlorhexidine (CHG) 2% Wipes; Standing  - Place sequential compression device; Standing  - Chlorohexidine Gluconate Bath; Standing  - Vital signs every 15 minutes; Standing  - Saline lock IV; Standing  - Perform baseline 20 minute non stress test - proceed with other orders if reactive criteria met.; Standing  - miSOPROStol split tablet 25 mcg  - Maternal vital signs, fetal and uterine assessments after each dose and hourly; Standing  - Electronic fetal monitoring; Standing  - May ambulate 30 minutes after insertion of Misoprostol (Cytotec); Standing  - Withhold Misoprostol (Cytotec) dosing:; Standing  - Oxytocin should be delayed until at least 4 hours after the last Misoprostol (Cytotec) dose administered; Standing  - Vital signs every 15 minutes; Standing  - miSOPROStoL tablet  800 mcg  - miSOPROStoL tablet 800 mcg  - Urinalysis, Reflex to Urine Culture; Standing  - CBC with Auto Differential; Standing  - Comprehensive metabolic panel; Standing  - Type & Screen; Standing  - SYPHILIS ANTIBODY (WITH REFLEX RPR); Standing  - HIV 1/2 Ag/Ab (4th Gen); Standing  - Hepatitis B surface antigen; Standing  - RAPID HIV; Standing  - CBC auto differential; Standing  - Type & Screen; Standing  - Rubella antibody, IgG; Standing  - Hepatitis B surface antigen; Standing  - Drug Screen, Urine; Standing  - Inpatient consult to Anesthesiology; Standing  - Diet Clear Liquid; Standing    7. Hemorrhoids during pregnancy in third trimester  - Admit to Inpatient; Standing  - Full code; Standing  - Vital Signs; Standing  - Vital signs- Early Labor(0-4 cm); Standing  - Vital Signs- Active Labor (4-10 cm); Standing  - Vital Signs Post Delivery; Standing  - Check Temperature, Membranes Intact; Standing  - Check Temperature, Membranes Ruptured; Standing  - Pulse Oximetry Continous while CONTINUOUS electronic fetal Monitor in use; Standing  - Cervical Exam; Standing  - Fetal / Uterine Monitoring - Continuous; Standing  - Fetal monitoring - scalp electrode; Standing  - Insert peripheral IV; Standing  - Domínguez to Gravity; Standing  - Domínguez Catheter Care every 12 hours; Standing  - Nurse to discontinue domínguez when patient no longer meets criteria; Standing  - Post Domínguez Catheter Removal Protocol; Standing  - Perform Bladder scan; Standing  - Perform Intermittent Catheterization; Standing  - Perform Bladder Scan, post intermittent cath; Standing  - Place indwelling catheter; Standing  - Watch for excessive vaginal bleeding; Standing  - Chlorhexidine Bath; Standing  - Decrease Oxytocin; Standing  - Discontinue oxytocin; Standing  - Oxytocin Titration Resumption; Standing  - Amnioinfusion PRN recurrent variable decelerations; Standing  - Administer a 500 -1000 ml IV fluid bolus as needed for; Standing  - Assess fundal  height/uterine firmness, lochia, perineum; Standing  - Check Temperature Post Delivery; Standing  - Notify Physician; Standing  - Notify OB care provider; Standing  - Notify physician of cervical change or lack of change; Standing  - Notify physician for excessive uterine activity; Standing  - Notify physician for Fetal Heart Tones consistent with Category II or Category III tracing; Standing  - Notify Pediatrician after delivery; Standing  - Notify Nursery / Level II Nursery; Standing  - Notify Nursery / Level II Nursery; Standing  - Abdominal prep for  Section; Standing  - Chlorhexidine (CHG) 2% Wipes; Standing  - Place sequential compression device; Standing  - Chlorohexidine Gluconate Bath; Standing  - Vital signs every 15 minutes; Standing  - Saline lock IV; Standing  - Perform baseline 20 minute non stress test - proceed with other orders if reactive criteria met.; Standing  - miSOPROStol split tablet 25 mcg  - Maternal vital signs, fetal and uterine assessments after each dose and hourly; Standing  - Electronic fetal monitoring; Standing  - May ambulate 30 minutes after insertion of Misoprostol (Cytotec); Standing  - Withhold Misoprostol (Cytotec) dosing:; Standing  - Oxytocin should be delayed until at least 4 hours after the last Misoprostol (Cytotec) dose administered; Standing  - Vital signs every 15 minutes; Standing  - miSOPROStoL tablet 800 mcg  - miSOPROStoL tablet 800 mcg  - Urinalysis, Reflex to Urine Culture; Standing  - CBC with Auto Differential; Standing  - Comprehensive metabolic panel; Standing  - Type & Screen; Standing  - SYPHILIS ANTIBODY (WITH REFLEX RPR); Standing  - HIV 1/2 Ag/Ab (4th Gen); Standing  - Hepatitis B surface antigen; Standing  - RAPID HIV; Standing  - CBC auto differential; Standing  - Type & Screen; Standing  - Rubella antibody, IgG; Standing  - Hepatitis B surface antigen; Standing  - Drug Screen, Urine; Standing  - Inpatient consult to Anesthesiology; Standing  -  Diet Clear Liquid; Standing        PLAN:      Discussed delivery process and plan.  Consent given for delivery.   Discussed labor unit, kick count, pre-eclampsia, rupture   membrane precautions.   GBS  GC CZ TV collected on urine     Reviewed standard labor unit and kick count precautions.  Discussed pre-eclampsia precautions.  Discussed COVID-19 risks, social distancing, and isolation if respiratory symptoms develop.     RTC 1 week ob check     This note was transcribed by Geena Weber. There may be transcription errors as a result, however minimal. Effort has been made to ensure accuracy of transcription, but any obvious errors or omissions should be clarified with the author of the document.       I agree with the above documentation.

## 2024-12-12 NOTE — PROGRESS NOTES
History & Physical    Subjective     History of Present Illness:  Patient is a 24 y.o. female  at 36w0d EVIE 25 here for ob pre admit. Positive fetal movement. Denies LOF, CTX.     Chief Complaint   Patient presents with    Routine Prenatal Visit     36w0d OB here for pre-admit.       Review of patient's allergies indicates:   Allergen Reactions    House dust Hives, Itching, Nausea And Vomiting and Shortness Of Breath    Compazine [prochlorperazine] Other (See Comments)     States muscles tensed up       Current Outpatient Medications   Medication Sig Dispense Refill    prenatal vit/iron fum/folic ac (RIGHT STEP PRENATAL VITAMINS ORAL) Take by mouth.       No current facility-administered medications for this visit.       Past Medical History:   Diagnosis Date    Anxiety disorder, unspecified     POTS (postural orthostatic tachycardia syndrome)      Past Surgical History:   Procedure Laterality Date    TONSILECTOMY      TYMPANOSTOMY TUBE PLACEMENT       Family History   Problem Relation Name Age of Onset    Lung cancer Maternal Aunt Maya Hoskins     Uterine cancer Maternal Aunt Maya Hoskins     Endometrial cancer Maternal Aunt Maya Hoskins     Osteoarthritis Mother Lacey     Endometrial cancer Maternal Aunt Zora Vasquez     Breast cancer Neg Hx      Colon cancer Neg Hx      Ovarian cancer Neg Hx      Cervical cancer Neg Hx       Social History     Tobacco Use    Smoking status: Never     Passive exposure: Never    Smokeless tobacco: Never   Substance Use Topics    Alcohol use: Not Currently    Drug use: Never     Comment: CBD use        Review of Systems:  Review of Systems   Respiratory: Negative.     Cardiovascular: Negative.    Gastrointestinal: Negative.    Genitourinary: Negative.           Objective     Vital Signs (Most Recent)      /64   Wt 62.5 kg (137 lb 12.8 oz)   LMP 2024 (Exact Date)   BMI 25.20 kg/m²           Physical Exam:  Physical Exam  Gen: No distress  Abdomen:  Gravid, non-tender  Extremities: No edema  Cervix: 2/50/-2  FHT: 140  FH: 36 cm         Assessment and Plan   1. Anxiety during pregnancy  - Admit to Inpatient; Standing  - Full code; Standing  - Vital Signs; Standing  - Vital signs- Early Labor(0-4 cm); Standing  - Vital Signs- Active Labor (4-10 cm); Standing  - Vital Signs Post Delivery; Standing  - Check Temperature, Membranes Intact; Standing  - Check Temperature, Membranes Ruptured; Standing  - Pulse Oximetry Continous while CONTINUOUS electronic fetal Monitor in use; Standing  - Cervical Exam; Standing  - Fetal / Uterine Monitoring - Continuous; Standing  - Fetal monitoring - scalp electrode; Standing  - Insert peripheral IV; Standing  - Domínguez to Gravity; Standing  - Domínguez Catheter Care every 12 hours; Standing  - Nurse to discontinue domínguez when patient no longer meets criteria; Standing  - Post Domínguez Catheter Removal Protocol; Standing  - Perform Bladder scan; Standing  - Perform Intermittent Catheterization; Standing  - Perform Bladder Scan, post intermittent cath; Standing  - Place indwelling catheter; Standing  - Watch for excessive vaginal bleeding; Standing  - Chlorhexidine Bath; Standing  - Decrease Oxytocin; Standing  - Discontinue oxytocin; Standing  - Oxytocin Titration Resumption; Standing  - Amnioinfusion PRN recurrent variable decelerations; Standing  - Administer a 500 -1000 ml IV fluid bolus as needed for; Standing  - Assess fundal height/uterine firmness, lochia, perineum; Standing  - Check Temperature Post Delivery; Standing  - Notify Physician; Standing  - Notify OB care provider; Standing  - Notify physician of cervical change or lack of change; Standing  - Notify physician for excessive uterine activity; Standing  - Notify physician for Fetal Heart Tones consistent with Category II or Category III tracing; Standing  - Notify Pediatrician after delivery; Standing  - Notify Nursery / Level II Nursery; Standing  - Notify Nursery / Level II  Nursery; Standing  - Abdominal prep for  Section; Standing  - Chlorhexidine (CHG) 2% Wipes; Standing  - Place sequential compression device; Standing  - Chlorohexidine Gluconate Bath; Standing  - Vital signs every 15 minutes; Standing  - Saline lock IV; Standing  - Perform baseline 20 minute non stress test - proceed with other orders if reactive criteria met.; Standing  - miSOPROStol split tablet 25 mcg  - Maternal vital signs, fetal and uterine assessments after each dose and hourly; Standing  - Electronic fetal monitoring; Standing  - May ambulate 30 minutes after insertion of Misoprostol (Cytotec); Standing  - Withhold Misoprostol (Cytotec) dosing:; Standing  - Oxytocin should be delayed until at least 4 hours after the last Misoprostol (Cytotec) dose administered; Standing  - Vital signs every 15 minutes; Standing  - miSOPROStoL tablet 800 mcg  - miSOPROStoL tablet 800 mcg  - Urinalysis, Reflex to Urine Culture; Standing  - CBC with Auto Differential; Standing  - Comprehensive metabolic panel; Standing  - Type & Screen; Standing  - SYPHILIS ANTIBODY (WITH REFLEX RPR); Standing  - HIV 1/2 Ag/Ab (4th Gen); Standing  - Hepatitis B surface antigen; Standing  - RAPID HIV; Standing  - CBC auto differential; Standing  - Type & Screen; Standing  - Rubella antibody, IgG; Standing  - Hepatitis B surface antigen; Standing  - Drug Screen, Urine; Standing  - Inpatient consult to Anesthesiology; Standing  - Diet Clear Liquid; Standing    2. 36 weeks gestation of pregnancy  - POCT Urinalysis, Dipstick, Automated, W/O Scope  - C.trach/N.gonor AMP RNA  - Trichomonas vaginalis Amplified RNA  - Strep Group B by PCR    3. Abnormal O'Castaneda glucose challenge test, antepartum  - POCT Urinalysis, Dipstick, Automated, W/O Scope  - C.trach/N.gonor AMP RNA  - Trichomonas vaginalis Amplified RNA  - Strep Group B by PCR  - Admit to Inpatient; Standing  - Full code; Standing  - Vital Signs; Standing  - Vital signs- Early  Labor(0-4 cm); Standing  - Vital Signs- Active Labor (4-10 cm); Standing  - Vital Signs Post Delivery; Standing  - Check Temperature, Membranes Intact; Standing  - Check Temperature, Membranes Ruptured; Standing  - Pulse Oximetry Continous while CONTINUOUS electronic fetal Monitor in use; Standing  - Cervical Exam; Standing  - Fetal / Uterine Monitoring - Continuous; Standing  - Fetal monitoring - scalp electrode; Standing  - Insert peripheral IV; Standing  - Domínguez to Gravity; Standing  - Domínguez Catheter Care every 12 hours; Standing  - Nurse to discontinue domínugez when patient no longer meets criteria; Standing  - Post Domínguez Catheter Removal Protocol; Standing  - Perform Bladder scan; Standing  - Perform Intermittent Catheterization; Standing  - Perform Bladder Scan, post intermittent cath; Standing  - Place indwelling catheter; Standing  - Watch for excessive vaginal bleeding; Standing  - Chlorhexidine Bath; Standing  - Decrease Oxytocin; Standing  - Discontinue oxytocin; Standing  - Oxytocin Titration Resumption; Standing  - Amnioinfusion PRN recurrent variable decelerations; Standing  - Administer a 500 -1000 ml IV fluid bolus as needed for; Standing  - Assess fundal height/uterine firmness, lochia, perineum; Standing  - Check Temperature Post Delivery; Standing  - Notify Physician; Standing  - Notify OB care provider; Standing  - Notify physician of cervical change or lack of change; Standing  - Notify physician for excessive uterine activity; Standing  - Notify physician for Fetal Heart Tones consistent with Category II or Category III tracing; Standing  - Notify Pediatrician after delivery; Standing  - Notify Nursery / Level II Nursery; Standing  - Notify Nursery / Level II Nursery; Standing  - Abdominal prep for  Section; Standing  - Chlorhexidine (CHG) 2% Wipes; Standing  - Place sequential compression device; Standing  - Chlorohexidine Gluconate Bath; Standing  - Vital signs every 15 minutes;  Standing  - Saline lock IV; Standing  - Perform baseline 20 minute non stress test - proceed with other orders if reactive criteria met.; Standing  - miSOPROStol split tablet 25 mcg  - Maternal vital signs, fetal and uterine assessments after each dose and hourly; Standing  - Electronic fetal monitoring; Standing  - May ambulate 30 minutes after insertion of Misoprostol (Cytotec); Standing  - Withhold Misoprostol (Cytotec) dosing:; Standing  - Oxytocin should be delayed until at least 4 hours after the last Misoprostol (Cytotec) dose administered; Standing  - Vital signs every 15 minutes; Standing  - miSOPROStoL tablet 800 mcg  - miSOPROStoL tablet 800 mcg  - Urinalysis, Reflex to Urine Culture; Standing  - CBC with Auto Differential; Standing  - Comprehensive metabolic panel; Standing  - Type & Screen; Standing  - SYPHILIS ANTIBODY (WITH REFLEX RPR); Standing  - HIV 1/2 Ag/Ab (4th Gen); Standing  - Hepatitis B surface antigen; Standing  - RAPID HIV; Standing  - CBC auto differential; Standing  - Type & Screen; Standing  - Rubella antibody, IgG; Standing  - Hepatitis B surface antigen; Standing  - Drug Screen, Urine; Standing  - Inpatient consult to Anesthesiology; Standing  - Diet Clear Liquid; Standing    4. POTS (postural orthostatic tachycardia syndrome)  - POCT Urinalysis, Dipstick, Automated, W/O Scope  - C.trach/N.gonor AMP RNA  - Trichomonas vaginalis Amplified RNA  - Strep Group B by PCR  - Admit to Inpatient; Standing  - Full code; Standing  - Vital Signs; Standing  - Vital signs- Early Labor(0-4 cm); Standing  - Vital Signs- Active Labor (4-10 cm); Standing  - Vital Signs Post Delivery; Standing  - Check Temperature, Membranes Intact; Standing  - Check Temperature, Membranes Ruptured; Standing  - Pulse Oximetry Continous while CONTINUOUS electronic fetal Monitor in use; Standing  - Cervical Exam; Standing  - Fetal / Uterine Monitoring - Continuous; Standing  - Fetal monitoring - scalp electrode;  Standing  - Insert peripheral IV; Standing  - Domínguez to Gravity; Standing  - Domínguez Catheter Care every 12 hours; Standing  - Nurse to discontinue domínguez when patient no longer meets criteria; Standing  - Post Domínguez Catheter Removal Protocol; Standing  - Perform Bladder scan; Standing  - Perform Intermittent Catheterization; Standing  - Perform Bladder Scan, post intermittent cath; Standing  - Place indwelling catheter; Standing  - Watch for excessive vaginal bleeding; Standing  - Chlorhexidine Bath; Standing  - Decrease Oxytocin; Standing  - Discontinue oxytocin; Standing  - Oxytocin Titration Resumption; Standing  - Amnioinfusion PRN recurrent variable decelerations; Standing  - Administer a 500 -1000 ml IV fluid bolus as needed for; Standing  - Assess fundal height/uterine firmness, lochia, perineum; Standing  - Check Temperature Post Delivery; Standing  - Notify Physician; Standing  - Notify OB care provider; Standing  - Notify physician of cervical change or lack of change; Standing  - Notify physician for excessive uterine activity; Standing  - Notify physician for Fetal Heart Tones consistent with Category II or Category III tracing; Standing  - Notify Pediatrician after delivery; Standing  - Notify Nursery / Level II Nursery; Standing  - Notify Nursery / Level II Nursery; Standing  - Abdominal prep for  Section; Standing  - Chlorhexidine (CHG) 2% Wipes; Standing  - Place sequential compression device; Standing  - Chlorohexidine Gluconate Bath; Standing  - Vital signs every 15 minutes; Standing  - Saline lock IV; Standing  - Perform baseline 20 minute non stress test - proceed with other orders if reactive criteria met.; Standing  - miSOPROStol split tablet 25 mcg  - Maternal vital signs, fetal and uterine assessments after each dose and hourly; Standing  - Electronic fetal monitoring; Standing  - May ambulate 30 minutes after insertion of Misoprostol (Cytotec); Standing  - Withhold Misoprostol (Cytotec)  dosing:; Standing  - Oxytocin should be delayed until at least 4 hours after the last Misoprostol (Cytotec) dose administered; Standing  - Vital signs every 15 minutes; Standing  - miSOPROStoL tablet 800 mcg  - miSOPROStoL tablet 800 mcg  - Urinalysis, Reflex to Urine Culture; Standing  - CBC with Auto Differential; Standing  - Comprehensive metabolic panel; Standing  - Type & Screen; Standing  - SYPHILIS ANTIBODY (WITH REFLEX RPR); Standing  - HIV 1/2 Ag/Ab (4th Gen); Standing  - Hepatitis B surface antigen; Standing  - RAPID HIV; Standing  - CBC auto differential; Standing  - Type & Screen; Standing  - Rubella antibody, IgG; Standing  - Hepatitis B surface antigen; Standing  - Drug Screen, Urine; Standing  - Inpatient consult to Anesthesiology; Standing  - Diet Clear Liquid; Standing    5. Fundal height low for dates in third trimester  - POCT Urinalysis, Dipstick, Automated, W/O Scope  - C.trach/N.gonor AMP RNA  - Trichomonas vaginalis Amplified RNA  - Strep Group B by PCR    6. History of gestational diabetes in prior pregnancy, currently pregnant in third trimester  - POCT Urinalysis, Dipstick, Automated, W/O Scope  - C.trach/N.gonor AMP RNA  - Trichomonas vaginalis Amplified RNA  - Strep Group B by PCR  - Admit to Inpatient; Standing  - Full code; Standing  - Vital Signs; Standing  - Vital signs- Early Labor(0-4 cm); Standing  - Vital Signs- Active Labor (4-10 cm); Standing  - Vital Signs Post Delivery; Standing  - Check Temperature, Membranes Intact; Standing  - Check Temperature, Membranes Ruptured; Standing  - Pulse Oximetry Continous while CONTINUOUS electronic fetal Monitor in use; Standing  - Cervical Exam; Standing  - Fetal / Uterine Monitoring - Continuous; Standing  - Fetal monitoring - scalp electrode; Standing  - Insert peripheral IV; Standing  - Domínguez to Gravity; Standing  - Domínguez Catheter Care every 12 hours; Standing  - Nurse to discontinue domínguez when patient no longer meets criteria; Standing  -  Post Coleman Catheter Removal Protocol; Standing  - Perform Bladder scan; Standing  - Perform Intermittent Catheterization; Standing  - Perform Bladder Scan, post intermittent cath; Standing  - Place indwelling catheter; Standing  - Watch for excessive vaginal bleeding; Standing  - Chlorhexidine Bath; Standing  - Decrease Oxytocin; Standing  - Discontinue oxytocin; Standing  - Oxytocin Titration Resumption; Standing  - Amnioinfusion PRN recurrent variable decelerations; Standing  - Administer a 500 -1000 ml IV fluid bolus as needed for; Standing  - Assess fundal height/uterine firmness, lochia, perineum; Standing  - Check Temperature Post Delivery; Standing  - Notify Physician; Standing  - Notify OB care provider; Standing  - Notify physician of cervical change or lack of change; Standing  - Notify physician for excessive uterine activity; Standing  - Notify physician for Fetal Heart Tones consistent with Category II or Category III tracing; Standing  - Notify Pediatrician after delivery; Standing  - Notify Nursery / Level II Nursery; Standing  - Notify Nursery / Level II Nursery; Standing  - Abdominal prep for  Section; Standing  - Chlorhexidine (CHG) 2% Wipes; Standing  - Place sequential compression device; Standing  - Chlorohexidine Gluconate Bath; Standing  - Vital signs every 15 minutes; Standing  - Saline lock IV; Standing  - Perform baseline 20 minute non stress test - proceed with other orders if reactive criteria met.; Standing  - miSOPROStol split tablet 25 mcg  - Maternal vital signs, fetal and uterine assessments after each dose and hourly; Standing  - Electronic fetal monitoring; Standing  - May ambulate 30 minutes after insertion of Misoprostol (Cytotec); Standing  - Withhold Misoprostol (Cytotec) dosing:; Standing  - Oxytocin should be delayed until at least 4 hours after the last Misoprostol (Cytotec) dose administered; Standing  - Vital signs every 15 minutes; Standing  - miSOPROStoL tablet  800 mcg  - miSOPROStoL tablet 800 mcg  - Urinalysis, Reflex to Urine Culture; Standing  - CBC with Auto Differential; Standing  - Comprehensive metabolic panel; Standing  - Type & Screen; Standing  - SYPHILIS ANTIBODY (WITH REFLEX RPR); Standing  - HIV 1/2 Ag/Ab (4th Gen); Standing  - Hepatitis B surface antigen; Standing  - RAPID HIV; Standing  - CBC auto differential; Standing  - Type & Screen; Standing  - Rubella antibody, IgG; Standing  - Hepatitis B surface antigen; Standing  - Drug Screen, Urine; Standing  - Inpatient consult to Anesthesiology; Standing  - Diet Clear Liquid; Standing    7. Hemorrhoids during pregnancy in third trimester  - Admit to Inpatient; Standing  - Full code; Standing  - Vital Signs; Standing  - Vital signs- Early Labor(0-4 cm); Standing  - Vital Signs- Active Labor (4-10 cm); Standing  - Vital Signs Post Delivery; Standing  - Check Temperature, Membranes Intact; Standing  - Check Temperature, Membranes Ruptured; Standing  - Pulse Oximetry Continous while CONTINUOUS electronic fetal Monitor in use; Standing  - Cervical Exam; Standing  - Fetal / Uterine Monitoring - Continuous; Standing  - Fetal monitoring - scalp electrode; Standing  - Insert peripheral IV; Standing  - Domínguez to Gravity; Standing  - Domínguez Catheter Care every 12 hours; Standing  - Nurse to discontinue domínguez when patient no longer meets criteria; Standing  - Post Domínguez Catheter Removal Protocol; Standing  - Perform Bladder scan; Standing  - Perform Intermittent Catheterization; Standing  - Perform Bladder Scan, post intermittent cath; Standing  - Place indwelling catheter; Standing  - Watch for excessive vaginal bleeding; Standing  - Chlorhexidine Bath; Standing  - Decrease Oxytocin; Standing  - Discontinue oxytocin; Standing  - Oxytocin Titration Resumption; Standing  - Amnioinfusion PRN recurrent variable decelerations; Standing  - Administer a 500 -1000 ml IV fluid bolus as needed for; Standing  - Assess fundal  height/uterine firmness, lochia, perineum; Standing  - Check Temperature Post Delivery; Standing  - Notify Physician; Standing  - Notify OB care provider; Standing  - Notify physician of cervical change or lack of change; Standing  - Notify physician for excessive uterine activity; Standing  - Notify physician for Fetal Heart Tones consistent with Category II or Category III tracing; Standing  - Notify Pediatrician after delivery; Standing  - Notify Nursery / Level II Nursery; Standing  - Notify Nursery / Level II Nursery; Standing  - Abdominal prep for  Section; Standing  - Chlorhexidine (CHG) 2% Wipes; Standing  - Place sequential compression device; Standing  - Chlorohexidine Gluconate Bath; Standing  - Vital signs every 15 minutes; Standing  - Saline lock IV; Standing  - Perform baseline 20 minute non stress test - proceed with other orders if reactive criteria met.; Standing  - miSOPROStol split tablet 25 mcg  - Maternal vital signs, fetal and uterine assessments after each dose and hourly; Standing  - Electronic fetal monitoring; Standing  - May ambulate 30 minutes after insertion of Misoprostol (Cytotec); Standing  - Withhold Misoprostol (Cytotec) dosing:; Standing  - Oxytocin should be delayed until at least 4 hours after the last Misoprostol (Cytotec) dose administered; Standing  - Vital signs every 15 minutes; Standing  - miSOPROStoL tablet 800 mcg  - miSOPROStoL tablet 800 mcg  - Urinalysis, Reflex to Urine Culture; Standing  - CBC with Auto Differential; Standing  - Comprehensive metabolic panel; Standing  - Type & Screen; Standing  - SYPHILIS ANTIBODY (WITH REFLEX RPR); Standing  - HIV 1/2 Ag/Ab (4th Gen); Standing  - Hepatitis B surface antigen; Standing  - RAPID HIV; Standing  - CBC auto differential; Standing  - Type & Screen; Standing  - Rubella antibody, IgG; Standing  - Hepatitis B surface antigen; Standing  - Drug Screen, Urine; Standing  - Inpatient consult to Anesthesiology; Standing  -  Diet Clear Liquid; Standing        PLAN:      Discussed delivery process and plan.  Consent given for delivery.   Discussed labor unit, kick count, pre-eclampsia, rupture   membrane precautions.   GBS  GC CZ TV collected on urine     Reviewed standard labor unit and kick count precautions.  Discussed pre-eclampsia precautions.  Discussed COVID-19 risks, social distancing, and isolation if respiratory symptoms develop.     RTC 1 week ob check     This note was transcribed by Geena Weber. There may be transcription errors as a result, however minimal. Effort has been made to ensure accuracy of transcription, but any obvious errors or omissions should be clarified with the author of the document.       I agree with the above documentation.

## 2024-12-16 ENCOUNTER — ROUTINE PRENATAL (OUTPATIENT)
Dept: OBSTETRICS AND GYNECOLOGY | Facility: CLINIC | Age: 24
End: 2024-12-16
Payer: MEDICAID

## 2024-12-16 VITALS — SYSTOLIC BLOOD PRESSURE: 112 MMHG | WEIGHT: 137.81 LBS | DIASTOLIC BLOOD PRESSURE: 64 MMHG | BODY MASS INDEX: 25.2 KG/M2

## 2024-12-16 DIAGNOSIS — O99.810 ABNORMAL O'SULLIVAN GLUCOSE CHALLENGE TEST, ANTEPARTUM: ICD-10-CM

## 2024-12-16 DIAGNOSIS — O99.340 ANXIETY DURING PREGNANCY: Primary | ICD-10-CM

## 2024-12-16 DIAGNOSIS — O26.843 FUNDAL HEIGHT LOW FOR DATES IN THIRD TRIMESTER: ICD-10-CM

## 2024-12-16 DIAGNOSIS — G90.A POTS (POSTURAL ORTHOSTATIC TACHYCARDIA SYNDROME): ICD-10-CM

## 2024-12-16 DIAGNOSIS — F41.9 ANXIETY DURING PREGNANCY: Primary | ICD-10-CM

## 2024-12-16 DIAGNOSIS — O22.43 HEMORRHOIDS DURING PREGNANCY IN THIRD TRIMESTER: ICD-10-CM

## 2024-12-16 DIAGNOSIS — Z3A.36 36 WEEKS GESTATION OF PREGNANCY: ICD-10-CM

## 2024-12-16 DIAGNOSIS — O09.293 HISTORY OF GESTATIONAL DIABETES IN PRIOR PREGNANCY, CURRENTLY PREGNANT IN THIRD TRIMESTER: ICD-10-CM

## 2024-12-16 DIAGNOSIS — Z86.32 HISTORY OF GESTATIONAL DIABETES IN PRIOR PREGNANCY, CURRENTLY PREGNANT IN THIRD TRIMESTER: ICD-10-CM

## 2024-12-16 PROBLEM — O26.892 PELVIC PAIN AFFECTING PREGNANCY IN SECOND TRIMESTER, ANTEPARTUM: Status: RESOLVED | Noted: 2024-09-24 | Resolved: 2024-12-16

## 2024-12-16 PROBLEM — O26.13 LOW MATERNAL WEIGHT GAIN, THIRD TRIMESTER: Status: ACTIVE | Noted: 2024-12-16

## 2024-12-16 PROBLEM — R10.2 PELVIC PAIN AFFECTING PREGNANCY IN SECOND TRIMESTER, ANTEPARTUM: Status: RESOLVED | Noted: 2024-09-24 | Resolved: 2024-12-16

## 2024-12-16 PROBLEM — O26.892 VAGINAL DISCHARGE DURING PREGNANCY IN SECOND TRIMESTER: Status: RESOLVED | Noted: 2024-09-24 | Resolved: 2024-12-16

## 2024-12-16 PROBLEM — N89.8 VAGINAL DISCHARGE DURING PREGNANCY IN SECOND TRIMESTER: Status: RESOLVED | Noted: 2024-09-24 | Resolved: 2024-12-16

## 2024-12-16 PROCEDURE — 87591 N.GONORRHOEAE DNA AMP PROB: CPT | Performed by: OBSTETRICS & GYNECOLOGY

## 2024-12-16 PROCEDURE — 87653 STREP B DNA AMP PROBE: CPT | Performed by: OBSTETRICS & GYNECOLOGY

## 2024-12-16 PROCEDURE — 87661 TRICHOMONAS VAGINALIS AMPLIF: CPT | Performed by: OBSTETRICS & GYNECOLOGY

## 2024-12-16 PROCEDURE — 99214 OFFICE O/P EST MOD 30 MIN: CPT | Mod: TH,,, | Performed by: OBSTETRICS & GYNECOLOGY

## 2024-12-16 RX ORDER — PENICILLIN G 3000000 [IU]/50ML
3 INJECTION, SOLUTION INTRAVENOUS
OUTPATIENT
Start: 2024-12-16

## 2024-12-16 RX ORDER — MUPIROCIN 20 MG/G
OINTMENT TOPICAL
OUTPATIENT
Start: 2024-12-16

## 2024-12-16 RX ORDER — TERBUTALINE SULFATE 1 MG/ML
0.25 INJECTION SUBCUTANEOUS
OUTPATIENT
Start: 2024-12-16

## 2024-12-16 RX ORDER — SODIUM CHLORIDE 9 MG/ML
INJECTION, SOLUTION INTRAVENOUS
OUTPATIENT
Start: 2024-12-16

## 2024-12-16 RX ORDER — TRANEXAMIC ACID 10 MG/ML
1000 INJECTION, SOLUTION INTRAVENOUS EVERY 30 MIN PRN
OUTPATIENT
Start: 2024-12-16

## 2024-12-16 RX ORDER — ONDANSETRON 4 MG/1
8 TABLET, ORALLY DISINTEGRATING ORAL EVERY 8 HOURS PRN
OUTPATIENT
Start: 2024-12-16

## 2024-12-16 RX ORDER — OXYTOCIN-SODIUM CHLORIDE 0.9% IV SOLN 30 UNIT/500ML 30-0.9/5 UT/ML-%
30 SOLUTION INTRAVENOUS ONCE AS NEEDED
OUTPATIENT
Start: 2024-12-16 | End: 2036-05-14

## 2024-12-16 RX ORDER — CALCIUM CARBONATE 200(500)MG
500 TABLET,CHEWABLE ORAL 3 TIMES DAILY PRN
OUTPATIENT
Start: 2024-12-16

## 2024-12-16 RX ORDER — DIPHENOXYLATE HYDROCHLORIDE AND ATROPINE SULFATE 2.5; .025 MG/1; MG/1
2 TABLET ORAL EVERY 6 HOURS PRN
OUTPATIENT
Start: 2024-12-16

## 2024-12-16 RX ORDER — SIMETHICONE 80 MG
1 TABLET,CHEWABLE ORAL 4 TIMES DAILY PRN
OUTPATIENT
Start: 2024-12-16

## 2024-12-16 RX ORDER — MISOPROSTOL 100 MCG
25 TABLET ORAL EVERY 4 HOURS PRN
OUTPATIENT
Start: 2024-12-16

## 2024-12-16 RX ORDER — MISOPROSTOL 100 UG/1
800 TABLET ORAL ONCE AS NEEDED
OUTPATIENT
Start: 2024-12-16 | End: 2036-05-14

## 2024-12-16 RX ORDER — OXYTOCIN/0.9 % SODIUM CHLORIDE 15/250 ML
95 PLASTIC BAG, INJECTION (ML) INTRAVENOUS CONTINUOUS PRN
OUTPATIENT
Start: 2024-12-16

## 2024-12-16 RX ORDER — METHYLERGONOVINE MALEATE 0.2 MG/ML
200 INJECTION INTRAVENOUS ONCE AS NEEDED
OUTPATIENT
Start: 2024-12-16 | End: 2036-05-14

## 2024-12-16 RX ORDER — OXYTOCIN/0.9 % SODIUM CHLORIDE 15/250 ML
95 PLASTIC BAG, INJECTION (ML) INTRAVENOUS ONCE AS NEEDED
OUTPATIENT
Start: 2024-12-16 | End: 2036-05-14

## 2024-12-16 RX ORDER — LIDOCAINE HYDROCHLORIDE 10 MG/ML
10 INJECTION, SOLUTION INFILTRATION; PERINEURAL ONCE AS NEEDED
OUTPATIENT
Start: 2024-12-16 | End: 2036-05-14

## 2024-12-16 RX ORDER — CARBOPROST TROMETHAMINE 250 UG/ML
250 INJECTION, SOLUTION INTRAMUSCULAR
OUTPATIENT
Start: 2024-12-16

## 2024-12-16 RX ORDER — MISOPROSTOL 100 UG/1
800 TABLET ORAL ONCE AS NEEDED
OUTPATIENT
Start: 2024-12-16

## 2024-12-16 RX ORDER — BUTORPHANOL TARTRATE 2 MG/ML
2 INJECTION INTRAMUSCULAR; INTRAVENOUS
OUTPATIENT
Start: 2024-12-16

## 2024-12-16 RX ORDER — ACETAMINOPHEN 325 MG/1
650 TABLET ORAL EVERY 6 HOURS PRN
OUTPATIENT
Start: 2024-12-16

## 2024-12-16 RX ORDER — CEFAZOLIN SODIUM 2 G/50ML
2 SOLUTION INTRAVENOUS ONCE
OUTPATIENT
Start: 2024-12-16 | End: 2024-12-16

## 2024-12-16 RX ORDER — CEFAZOLIN SODIUM 2 G/50ML
2 SOLUTION INTRAVENOUS ONCE AS NEEDED
OUTPATIENT
Start: 2024-12-16 | End: 2036-05-14

## 2024-12-16 RX ORDER — OXYTOCIN 10 [USP'U]/ML
10 INJECTION, SOLUTION INTRAMUSCULAR; INTRAVENOUS ONCE AS NEEDED
OUTPATIENT
Start: 2024-12-16 | End: 2036-05-14

## 2024-12-16 RX ORDER — SODIUM CHLORIDE, SODIUM LACTATE, POTASSIUM CHLORIDE, CALCIUM CHLORIDE 600; 310; 30; 20 MG/100ML; MG/100ML; MG/100ML; MG/100ML
INJECTION, SOLUTION INTRAVENOUS CONTINUOUS
OUTPATIENT
Start: 2024-12-16

## 2024-12-16 RX ORDER — CEFAZOLIN SODIUM 1 G/50ML
1 SOLUTION INTRAVENOUS
OUTPATIENT
Start: 2024-12-16

## 2024-12-16 RX ORDER — CLINDAMYCIN PHOSPHATE 900 MG/50ML
900 INJECTION, SOLUTION INTRAVENOUS
OUTPATIENT
Start: 2024-12-16

## 2024-12-16 RX ORDER — OXYTOCIN-SODIUM CHLORIDE 0.9% IV SOLN 30 UNIT/500ML 30-0.9/5 UT/ML-%
10 SOLUTION INTRAVENOUS ONCE AS NEEDED
OUTPATIENT
Start: 2024-12-16 | End: 2036-05-14

## 2024-12-16 RX ORDER — BUTORPHANOL TARTRATE 1 MG/ML
1 INJECTION INTRAMUSCULAR; INTRAVENOUS
OUTPATIENT
Start: 2024-12-16

## 2024-12-17 ENCOUNTER — HOSPITAL ENCOUNTER (OUTPATIENT)
Dept: PREADMISSION TESTING | Facility: HOSPITAL | Age: 24
Discharge: HOME | End: 2024-12-17
Payer: MEDICAID

## 2024-12-17 DIAGNOSIS — O09.293 HISTORY OF GESTATIONAL DIABETES IN PRIOR PREGNANCY, CURRENTLY PREGNANT IN THIRD TRIMESTER: ICD-10-CM

## 2024-12-17 DIAGNOSIS — G90.A POTS (POSTURAL ORTHOSTATIC TACHYCARDIA SYNDROME): ICD-10-CM

## 2024-12-17 DIAGNOSIS — O99.810 ABNORMAL O'SULLIVAN GLUCOSE CHALLENGE TEST, ANTEPARTUM: ICD-10-CM

## 2024-12-17 DIAGNOSIS — O22.43 HEMORRHOIDS DURING PREGNANCY IN THIRD TRIMESTER: ICD-10-CM

## 2024-12-17 DIAGNOSIS — O99.340 ANXIETY DURING PREGNANCY: ICD-10-CM

## 2024-12-17 DIAGNOSIS — F41.9 ANXIETY DURING PREGNANCY: ICD-10-CM

## 2024-12-17 DIAGNOSIS — Z86.32 HISTORY OF GESTATIONAL DIABETES IN PRIOR PREGNANCY, CURRENTLY PREGNANT IN THIRD TRIMESTER: ICD-10-CM

## 2024-12-17 LAB
BASOPHILS # BLD AUTO: 0.03 X10(3)/MCL (ref 0.01–0.08)
BASOPHILS NFR BLD AUTO: 0.3 % (ref 0.1–1.2)
C TRACH RRNA SPEC QL NAA+PROBE: NEGATIVE
EOSINOPHIL # BLD AUTO: 0.05 X10(3)/MCL (ref 0.04–0.36)
EOSINOPHIL NFR BLD AUTO: 0.4 % (ref 0.7–7)
ERYTHROCYTE [DISTWIDTH] IN BLOOD BY AUTOMATED COUNT: 13.4 % (ref 11–14.5)
HBV SURFACE AG SERPL QL IA: NEGATIVE
HBV SURFACE AG SERPL QL IA: NORMAL
HCT VFR BLD AUTO: 34.2 % (ref 36–48)
HGB BLD-MCNC: 11.6 G/DL (ref 11.8–16)
HIV 1+2 AB+HIV1 P24 AG SERPL QL IA: NONREACTIVE
IMM GRANULOCYTES # BLD AUTO: 0.04 X10(3)/MCL (ref 0–0.03)
IMM GRANULOCYTES NFR BLD AUTO: 0.4 % (ref 0–0.5)
LYMPHOCYTES # BLD AUTO: 1.94 X10(3)/MCL (ref 1.16–3.74)
LYMPHOCYTES NFR BLD AUTO: 17.3 % (ref 20–55)
MCH RBC QN AUTO: 29.8 PG (ref 27–34)
MCHC RBC AUTO-ENTMCNC: 33.9 G/DL (ref 31–37)
MCV RBC AUTO: 87.9 FL (ref 79–99)
MONOCYTES # BLD AUTO: 0.61 X10(3)/MCL (ref 0.24–0.36)
MONOCYTES NFR BLD AUTO: 5.4 % (ref 4.7–12.5)
N GONORRHOEA RRNA SPEC QL NAA+PROBE: NEGATIVE
NEUTROPHILS # BLD AUTO: 8.55 X10(3)/MCL (ref 1.56–6.13)
NEUTROPHILS NFR BLD AUTO: 76.2 % (ref 37–73)
NRBC BLD AUTO-RTO: 0 %
PLATELET # BLD AUTO: 192 X10(3)/MCL (ref 140–371)
PMV BLD AUTO: 10.9 FL (ref 9.4–12.4)
RBC # BLD AUTO: 3.89 X10(6)/MCL (ref 4–5.1)
SPECIMEN SOURCE: NORMAL
STREP B PCR (OHS): NOT DETECTED
T PALLIDUM AB SER QL: NONREACTIVE
T VAGINALIS RRNA SPEC QL NAA+PROBE: NEGATIVE
WBC # BLD AUTO: 11.22 X10(3)/MCL (ref 4–11.5)

## 2024-12-17 PROCEDURE — 87389 HIV-1 AG W/HIV-1&-2 AB AG IA: CPT | Performed by: OBSTETRICS & GYNECOLOGY

## 2024-12-17 PROCEDURE — 86780 TREPONEMA PALLIDUM: CPT | Performed by: OBSTETRICS & GYNECOLOGY

## 2024-12-17 PROCEDURE — 87340 HEPATITIS B SURFACE AG IA: CPT | Performed by: OBSTETRICS & GYNECOLOGY

## 2024-12-17 PROCEDURE — 85025 COMPLETE CBC W/AUTO DIFF WBC: CPT | Performed by: OBSTETRICS & GYNECOLOGY

## 2024-12-17 NOTE — DISCHARGE INSTRUCTIONS
Things to Bring to the Hospital    To help make your stay as comfortable as possible you should bring the following items when you come to the hospital:    TOOTHBRUSH  TOOTHPASTE  SHAMPOO/CONDITIONER  DEODORANT  HAIRBRUSH OR COMB  UNDERWEAR  SOAP  SANITARY NAPKINS-OVERNIGHTS (This item is optional unless you have a preference for the type you use.)  NURSING PADS  BREAST PUMP AND NURSING PILLOW    Other items needed for both mom and baby include:    Bras (2-3).  These should be ones that were worn during pregnancy or nursing bras for breastfeeding.   Gowns, slippers, and a robe.   One outfit of baby clothes to take the baby home in as well as a blanket. The hospital will provide baby clothes, blankets, diapers, and wipes during the hospital stay.   Federally approved car seat is required for the infant to go home.     OB VISITATION POLICY    The OB Department is open to family for patient visitation.  Smoking is not allowed on the premises.  Visitors must check with staff before entering a patient's room if there is a sign posted on the door or in the harper.   Visitors may be in the room with baby if they are free from infection or any signs and symptoms of illness.  All visitors must use hand  or soap and water before touching the baby.   No children under age 12 are allowed to sleep overnight or to be left unattended in room.   Our rooms only accommodate one overnight guest.  While in labor and delivery room, you will be allowed 2 support persons if you choose and one additional guest will be allowed at the discretion of the physician or nurse.   The 2-3 labor and delivery support people may be interchangeable at the discretion of the OB nurse in charge of your care.   Any other visitors will be directed to the waiting room or post-partum room until after delivery.   No one will be allowed to stay in the labor and delivery hallway.  After delivery, visitors will not be limited unless a procedure is in  progress or your conditions warrants it.    Videos and photographs are not allowed to be taken during the delivery process. They may be taken after the baby is born and declared in good condition by nursing staff or physician.   If you will be having a delivery by , you will be allowed (1) support person in operating room unless the procedure is an emergency or under general anesthesia. No video or photography is allowed until after the procedure is complete.  The support person present will be asked to leave the operating room with the baby after delivery or if any problems arise.   Family and friends should be made familiar with the visitor policy for our facility, so that we may insure that the safety and well-being of you and your baby during this very important time.

## 2024-12-17 NOTE — PROGRESS NOTES
HPI  24 y.o.  at 37w0d here for OB check.  Reports positive fetal movement. denies LOF, CTX.       ROS  Review of Systems   Constitutional:  Negative for chills and fever.   Gastrointestinal:  Negative for abdominal pain, constipation and diarrhea.   Genitourinary:  Negative for flank pain, genital sores, pelvic pain, urgency, vaginal bleeding, vaginal discharge, vaginal pain, postcoital bleeding and vaginal odor.         OBJECTIVE  /64   Wt 62.8 kg (138 lb 6.4 oz)   LMP 2024 (Exact Date)   BMI 25.31 kg/m²     Gen: No distress  Abdomen: Gravid, non-tender  Extremities: No edema  Cervix: 2.5/50/-2  FHT: 150    ASSESSMENT  1. Anxiety during pregnancy    2. 37 weeks gestation of pregnancy  - POCT Urinalysis, Dipstick, Automated, W/O Scope    3. History of gestational diabetes in prior pregnancy, currently pregnant in third trimester    4. Hemorrhoids during pregnancy in third trimester        PLAN  Reviewed standard labor unit and kick count precautions.  Discussed pre-eclampsia precautions.  Discussed COVID-19 risks, social distancing, and isolation if respiratory symptoms develop.     PO hydration, CTX precautions     RTC 1 week ob check     This note was transcribed by Geena Weber. There may be transcription errors as a result, however minimal. Effort has been made to ensure accuracy of transcription, but any obvious errors or omissions should be clarified with the author of the document.       I agree with the above documentation.

## 2024-12-23 ENCOUNTER — ROUTINE PRENATAL (OUTPATIENT)
Dept: OBSTETRICS AND GYNECOLOGY | Facility: CLINIC | Age: 24
End: 2024-12-23
Payer: MEDICAID

## 2024-12-23 VITALS
WEIGHT: 138.38 LBS | BODY MASS INDEX: 25.31 KG/M2 | DIASTOLIC BLOOD PRESSURE: 64 MMHG | SYSTOLIC BLOOD PRESSURE: 110 MMHG

## 2024-12-23 DIAGNOSIS — Z86.32 HISTORY OF GESTATIONAL DIABETES IN PRIOR PREGNANCY, CURRENTLY PREGNANT IN THIRD TRIMESTER: ICD-10-CM

## 2024-12-23 DIAGNOSIS — Z3A.37 37 WEEKS GESTATION OF PREGNANCY: ICD-10-CM

## 2024-12-23 DIAGNOSIS — F41.9 ANXIETY DURING PREGNANCY: Primary | ICD-10-CM

## 2024-12-23 DIAGNOSIS — O99.340 ANXIETY DURING PREGNANCY: Primary | ICD-10-CM

## 2024-12-23 DIAGNOSIS — O22.43 HEMORRHOIDS DURING PREGNANCY IN THIRD TRIMESTER: ICD-10-CM

## 2024-12-23 DIAGNOSIS — O09.293 HISTORY OF GESTATIONAL DIABETES IN PRIOR PREGNANCY, CURRENTLY PREGNANT IN THIRD TRIMESTER: ICD-10-CM

## 2024-12-26 NOTE — PROGRESS NOTES
HPI  24 y.o.  at 38w0d here for OB check.  Reports positive fetal movement. Occ CTX. denies LOF.      ROS  Review of Systems   Constitutional:  Negative for chills and fever.   Gastrointestinal:  Negative for abdominal pain, constipation and diarrhea.   Genitourinary:  Negative for flank pain, genital sores, pelvic pain, urgency, vaginal bleeding, vaginal discharge, vaginal pain, postcoital bleeding and vaginal odor.         OBJECTIVE  /62   Wt 63.7 kg (140 lb 6.4 oz)   LMP 2024 (Exact Date)   BMI 25.68 kg/m²     Gen: No distress  Abdomen: Gravid, non-tender  Extremities: No edema  Cervix: 2.5/50/-2  FHT: 126      ASSESSMENT  1. Anxiety during pregnancy    2. 38 weeks gestation of pregnancy  - POCT Urinalysis, Dipstick, Automated, W/O Scope    3. POTS (postural orthostatic tachycardia syndrome)    4. Hemorrhoids during pregnancy in third trimester        PLAN  Reviewed standard labor unit and kick count precautions.  Discussed pre-eclampsia precautions.  Discussed COVID-19 risks, social distancing, and isolation if respiratory symptoms develop.     PO hydration, CTX precautions   Desires 39 wk induction.  Report to CRISTY 24 at midnight     RTC post partum     This note was transcribed by Geena Weber. There may be transcription errors as a result, however minimal. Effort has been made to ensure accuracy of transcription, but any obvious errors or omissions should be clarified with the author of the document.       I agree with the above documentation.

## 2024-12-30 ENCOUNTER — ROUTINE PRENATAL (OUTPATIENT)
Dept: OBSTETRICS AND GYNECOLOGY | Facility: CLINIC | Age: 24
End: 2024-12-30
Payer: MEDICAID

## 2024-12-30 VITALS
DIASTOLIC BLOOD PRESSURE: 62 MMHG | WEIGHT: 140.38 LBS | SYSTOLIC BLOOD PRESSURE: 110 MMHG | BODY MASS INDEX: 25.68 KG/M2

## 2024-12-30 DIAGNOSIS — F41.9 ANXIETY DURING PREGNANCY: Primary | ICD-10-CM

## 2024-12-30 DIAGNOSIS — O99.340 ANXIETY DURING PREGNANCY: Primary | ICD-10-CM

## 2024-12-30 DIAGNOSIS — G90.A POTS (POSTURAL ORTHOSTATIC TACHYCARDIA SYNDROME): ICD-10-CM

## 2024-12-30 DIAGNOSIS — Z3A.38 38 WEEKS GESTATION OF PREGNANCY: ICD-10-CM

## 2024-12-30 DIAGNOSIS — O22.43 HEMORRHOIDS DURING PREGNANCY IN THIRD TRIMESTER: ICD-10-CM

## 2024-12-30 PROCEDURE — 99213 OFFICE O/P EST LOW 20 MIN: CPT | Mod: TH,,, | Performed by: OBSTETRICS & GYNECOLOGY

## 2025-01-05 ENCOUNTER — HOSPITAL ENCOUNTER (INPATIENT)
Facility: HOSPITAL | Age: 25
LOS: 2 days | Discharge: HOME | End: 2025-01-07
Attending: OBSTETRICS & GYNECOLOGY | Admitting: OBSTETRICS & GYNECOLOGY
Payer: MEDICAID

## 2025-01-05 DIAGNOSIS — F41.9 ANXIETY DURING PREGNANCY: ICD-10-CM

## 2025-01-05 DIAGNOSIS — O22.43 HEMORRHOIDS DURING PREGNANCY IN THIRD TRIMESTER: ICD-10-CM

## 2025-01-05 DIAGNOSIS — O99.340 ANXIETY DURING PREGNANCY: ICD-10-CM

## 2025-01-05 DIAGNOSIS — G90.A POTS (POSTURAL ORTHOSTATIC TACHYCARDIA SYNDROME): ICD-10-CM

## 2025-01-05 DIAGNOSIS — Z86.32 HISTORY OF GESTATIONAL DIABETES IN PRIOR PREGNANCY, CURRENTLY PREGNANT IN THIRD TRIMESTER: ICD-10-CM

## 2025-01-05 DIAGNOSIS — O26.13 LOW MATERNAL WEIGHT GAIN, THIRD TRIMESTER: ICD-10-CM

## 2025-01-05 DIAGNOSIS — O99.810 ABNORMAL O'SULLIVAN GLUCOSE CHALLENGE TEST, ANTEPARTUM: ICD-10-CM

## 2025-01-05 DIAGNOSIS — O09.293 HISTORY OF GESTATIONAL DIABETES IN PRIOR PREGNANCY, CURRENTLY PREGNANT IN THIRD TRIMESTER: ICD-10-CM

## 2025-01-05 PROCEDURE — 11000001 HC ACUTE MED/SURG PRIVATE ROOM

## 2025-01-06 ENCOUNTER — ANESTHESIA EVENT (OUTPATIENT)
Dept: OBSTETRICS AND GYNECOLOGY | Facility: HOSPITAL | Age: 25
End: 2025-01-06
Payer: MEDICAID

## 2025-01-06 ENCOUNTER — ANESTHESIA (OUTPATIENT)
Dept: OBSTETRICS AND GYNECOLOGY | Facility: HOSPITAL | Age: 25
End: 2025-01-06
Payer: MEDICAID

## 2025-01-06 LAB
ALBUMIN SERPL-MCNC: 3.4 G/DL (ref 3.4–5)
ALBUMIN/GLOB SERPL: 1.1 RATIO
ALP SERPL-CCNC: 177 UNIT/L (ref 50–144)
ALT SERPL-CCNC: 13 UNIT/L (ref 1–45)
ANION GAP SERPL CALC-SCNC: 2 MEQ/L (ref 2–13)
AST SERPL-CCNC: 26 UNIT/L (ref 14–36)
BASOPHILS # BLD AUTO: 0.03 X10(3)/MCL (ref 0.01–0.08)
BASOPHILS NFR BLD AUTO: 0.3 % (ref 0.1–1.2)
BILIRUB SERPL-MCNC: 0.5 MG/DL (ref 0–1)
BILIRUB UR QL STRIP.AUTO: NEGATIVE
BUN SERPL-MCNC: 9 MG/DL (ref 7–20)
CALCIUM SERPL-MCNC: 8.7 MG/DL (ref 8.4–10.2)
CHLORIDE SERPL-SCNC: 108 MMOL/L (ref 98–110)
CLARITY UR: CLEAR
CO2 SERPL-SCNC: 23 MMOL/L (ref 21–32)
COLOR UR AUTO: YELLOW
CREAT SERPL-MCNC: 0.71 MG/DL (ref 0.66–1.25)
CREAT/UREA NIT SERPL: 13 (ref 12–20)
EOSINOPHIL # BLD AUTO: 0.07 X10(3)/MCL (ref 0.04–0.36)
EOSINOPHIL NFR BLD AUTO: 0.6 % (ref 0.7–7)
ERYTHROCYTE [DISTWIDTH] IN BLOOD BY AUTOMATED COUNT: 13.6 % (ref 11–14.5)
GFR SERPLBLD CREATININE-BSD FMLA CKD-EPI: >90 ML/MIN/1.73/M2
GLOBULIN SER-MCNC: 3.2 GM/DL (ref 2–3.9)
GLUCOSE SERPL-MCNC: 123 MG/DL (ref 70–115)
GLUCOSE UR QL STRIP: NEGATIVE
GROUP & RH: NORMAL
HCT VFR BLD AUTO: 32.3 % (ref 36–48)
HCT VFR BLD AUTO: 32.3 % (ref 36–48)
HGB BLD-MCNC: 10.8 G/DL (ref 11.8–16)
HGB BLD-MCNC: 11.1 G/DL (ref 11.8–16)
HGB UR QL STRIP: NEGATIVE
IMM GRANULOCYTES # BLD AUTO: 0.03 X10(3)/MCL (ref 0–0.03)
IMM GRANULOCYTES NFR BLD AUTO: 0.3 % (ref 0–0.5)
INDIRECT COOMBS: NORMAL
KETONES UR QL STRIP: NEGATIVE
LEUKOCYTE ESTERASE UR QL STRIP: NEGATIVE
LYMPHOCYTES # BLD AUTO: 2.25 X10(3)/MCL (ref 1.16–3.74)
LYMPHOCYTES NFR BLD AUTO: 20.3 % (ref 20–55)
MCH RBC QN AUTO: 29.8 PG (ref 27–34)
MCHC RBC AUTO-ENTMCNC: 34.4 G/DL (ref 31–37)
MCV RBC AUTO: 86.8 FL (ref 79–99)
MONOCYTES # BLD AUTO: 0.74 X10(3)/MCL (ref 0.24–0.36)
MONOCYTES NFR BLD AUTO: 6.7 % (ref 4.7–12.5)
NEUTROPHILS # BLD AUTO: 7.99 X10(3)/MCL (ref 1.56–6.13)
NEUTROPHILS NFR BLD AUTO: 71.8 % (ref 37–73)
NITRITE UR QL STRIP: NEGATIVE
NRBC BLD AUTO-RTO: 0 %
PH UR STRIP: 7 [PH]
PLATELET # BLD AUTO: 176 X10(3)/MCL (ref 140–371)
PMV BLD AUTO: 10.5 FL (ref 9.4–12.4)
POTASSIUM SERPL-SCNC: 3.9 MMOL/L (ref 3.5–5.1)
PROT SERPL-MCNC: 6.6 GM/DL (ref 6.3–8.2)
PROT UR QL STRIP: NEGATIVE
RBC # BLD AUTO: 3.72 X10(6)/MCL (ref 4–5.1)
SODIUM SERPL-SCNC: 133 MMOL/L (ref 136–145)
SP GR UR STRIP.AUTO: <=1.005 (ref 1–1.03)
SPECIMEN OUTDATE: NORMAL
UROBILINOGEN UR STRIP-ACNC: 0.2
WBC # BLD AUTO: 11.11 X10(3)/MCL (ref 4–11.5)

## 2025-01-06 PROCEDURE — 51702 INSERT TEMP BLADDER CATH: CPT

## 2025-01-06 PROCEDURE — 62326 NJX INTERLAMINAR LMBR/SAC: CPT | Performed by: NURSE ANESTHETIST, CERTIFIED REGISTERED

## 2025-01-06 PROCEDURE — 25000003 PHARM REV CODE 250: Performed by: OBSTETRICS & GYNECOLOGY

## 2025-01-06 PROCEDURE — 51701 INSERT BLADDER CATHETER: CPT

## 2025-01-06 PROCEDURE — 10907ZC DRAINAGE OF AMNIOTIC FLUID, THERAPEUTIC FROM PRODUCTS OF CONCEPTION, VIA NATURAL OR ARTIFICIAL OPENING: ICD-10-PCS | Performed by: OBSTETRICS & GYNECOLOGY

## 2025-01-06 PROCEDURE — 80053 COMPREHEN METABOLIC PANEL: CPT | Performed by: OBSTETRICS & GYNECOLOGY

## 2025-01-06 PROCEDURE — 81003 URINALYSIS AUTO W/O SCOPE: CPT | Performed by: OBSTETRICS & GYNECOLOGY

## 2025-01-06 PROCEDURE — 36415 COLL VENOUS BLD VENIPUNCTURE: CPT | Performed by: OBSTETRICS & GYNECOLOGY

## 2025-01-06 PROCEDURE — 3E0DXGC INTRODUCTION OF OTHER THERAPEUTIC SUBSTANCE INTO MOUTH AND PHARYNX, EXTERNAL APPROACH: ICD-10-PCS | Performed by: OBSTETRICS & GYNECOLOGY

## 2025-01-06 PROCEDURE — 11000001 HC ACUTE MED/SURG PRIVATE ROOM

## 2025-01-06 PROCEDURE — 85018 HEMOGLOBIN: CPT | Performed by: OBSTETRICS & GYNECOLOGY

## 2025-01-06 PROCEDURE — 59409 OBSTETRICAL CARE: CPT | Mod: QZ,P2,, | Performed by: NURSE ANESTHETIST, CERTIFIED REGISTERED

## 2025-01-06 PROCEDURE — 63600175 PHARM REV CODE 636 W HCPCS: Performed by: OBSTETRICS & GYNECOLOGY

## 2025-01-06 PROCEDURE — 0KQM0ZZ REPAIR PERINEUM MUSCLE, OPEN APPROACH: ICD-10-PCS | Performed by: OBSTETRICS & GYNECOLOGY

## 2025-01-06 PROCEDURE — 85025 COMPLETE CBC W/AUTO DIFF WBC: CPT | Performed by: OBSTETRICS & GYNECOLOGY

## 2025-01-06 PROCEDURE — 86850 RBC ANTIBODY SCREEN: CPT | Performed by: OBSTETRICS & GYNECOLOGY

## 2025-01-06 PROCEDURE — 72200005 HC VAGINAL DELIVERY LEVEL II

## 2025-01-06 PROCEDURE — 3E0P7VZ INTRODUCTION OF HORMONE INTO FEMALE REPRODUCTIVE, VIA NATURAL OR ARTIFICIAL OPENING: ICD-10-PCS | Performed by: OBSTETRICS & GYNECOLOGY

## 2025-01-06 PROCEDURE — 72100002 HC LABOR CARE, 1ST 8 HOURS

## 2025-01-06 PROCEDURE — 63600175 PHARM REV CODE 636 W HCPCS: Performed by: NURSE ANESTHETIST, CERTIFIED REGISTERED

## 2025-01-06 RX ORDER — METHYLERGONOVINE MALEATE 0.2 MG/ML
200 INJECTION INTRAVENOUS ONCE AS NEEDED
Status: DISCONTINUED | OUTPATIENT
Start: 2025-01-06 | End: 2025-01-07 | Stop reason: HOSPADM

## 2025-01-06 RX ORDER — DIPHENHYDRAMINE HYDROCHLORIDE 50 MG/ML
25 INJECTION INTRAMUSCULAR; INTRAVENOUS EVERY 4 HOURS PRN
Status: DISCONTINUED | OUTPATIENT
Start: 2025-01-06 | End: 2025-01-07 | Stop reason: HOSPADM

## 2025-01-06 RX ORDER — OXYTOCIN-SODIUM CHLORIDE 0.9% IV SOLN 30 UNIT/500ML 30-0.9/5 UT/ML-%
30 SOLUTION INTRAVENOUS ONCE AS NEEDED
Status: DISCONTINUED | OUTPATIENT
Start: 2025-01-06 | End: 2025-01-07 | Stop reason: HOSPADM

## 2025-01-06 RX ORDER — TRANEXAMIC ACID 10 MG/ML
1000 INJECTION, SOLUTION INTRAVENOUS EVERY 30 MIN PRN
Status: DISCONTINUED | OUTPATIENT
Start: 2025-01-06 | End: 2025-01-07 | Stop reason: HOSPADM

## 2025-01-06 RX ORDER — OXYTOCIN/0.9 % SODIUM CHLORIDE 15/250 ML
95 PLASTIC BAG, INJECTION (ML) INTRAVENOUS ONCE AS NEEDED
Status: DISCONTINUED | OUTPATIENT
Start: 2025-01-06 | End: 2025-01-07 | Stop reason: HOSPADM

## 2025-01-06 RX ORDER — BISACODYL 10 MG/1
10 SUPPOSITORY RECTAL DAILY PRN
Status: DISCONTINUED | OUTPATIENT
Start: 2025-01-06 | End: 2025-01-07 | Stop reason: HOSPADM

## 2025-01-06 RX ORDER — ONDANSETRON 4 MG/1
8 TABLET, ORALLY DISINTEGRATING ORAL EVERY 8 HOURS PRN
Status: DISCONTINUED | OUTPATIENT
Start: 2025-01-06 | End: 2025-01-07 | Stop reason: HOSPADM

## 2025-01-06 RX ORDER — HYDROCORTISONE 25 MG/G
CREAM TOPICAL 3 TIMES DAILY PRN
Status: DISCONTINUED | OUTPATIENT
Start: 2025-01-06 | End: 2025-01-07 | Stop reason: HOSPADM

## 2025-01-06 RX ORDER — LIDOCAINE HCL/EPINEPHRINE/PF 2%-1:200K
1 VIAL (ML) INJECTION ONCE
Status: DISCONTINUED | OUTPATIENT
Start: 2025-01-06 | End: 2025-01-07 | Stop reason: HOSPADM

## 2025-01-06 RX ORDER — CARBOPROST TROMETHAMINE 250 UG/ML
250 INJECTION, SOLUTION INTRAMUSCULAR
Status: DISCONTINUED | OUTPATIENT
Start: 2025-01-06 | End: 2025-01-07 | Stop reason: HOSPADM

## 2025-01-06 RX ORDER — ACETAMINOPHEN 325 MG/1
650 TABLET ORAL EVERY 6 HOURS PRN
Status: DISCONTINUED | OUTPATIENT
Start: 2025-01-06 | End: 2025-01-07 | Stop reason: HOSPADM

## 2025-01-06 RX ORDER — LIDOCAINE HYDROCHLORIDE 10 MG/ML
10 INJECTION, SOLUTION INFILTRATION; PERINEURAL ONCE AS NEEDED
Status: COMPLETED | OUTPATIENT
Start: 2025-01-06 | End: 2025-01-06

## 2025-01-06 RX ORDER — ALUMINUM HYDROXIDE, MAGNESIUM HYDROXIDE, AND SIMETHICONE 1200; 120; 1200 MG/30ML; MG/30ML; MG/30ML
30 SUSPENSION ORAL EVERY 6 HOURS PRN
Status: DISCONTINUED | OUTPATIENT
Start: 2025-01-06 | End: 2025-01-07 | Stop reason: HOSPADM

## 2025-01-06 RX ORDER — DIPHENHYDRAMINE HCL 25 MG
25 CAPSULE ORAL EVERY 4 HOURS PRN
Status: DISCONTINUED | OUTPATIENT
Start: 2025-01-06 | End: 2025-01-07 | Stop reason: HOSPADM

## 2025-01-06 RX ORDER — HYDROCODONE BITARTRATE AND ACETAMINOPHEN 7.5; 325 MG/1; MG/1
1 TABLET ORAL EVERY 4 HOURS PRN
Status: DISCONTINUED | OUTPATIENT
Start: 2025-01-06 | End: 2025-01-07 | Stop reason: HOSPADM

## 2025-01-06 RX ORDER — LIDOCAINE HYDROCHLORIDE AND EPINEPHRINE 15; 5 MG/ML; UG/ML
INJECTION, SOLUTION EPIDURAL
Status: DISCONTINUED | OUTPATIENT
Start: 2025-01-06 | End: 2025-01-06

## 2025-01-06 RX ORDER — SODIUM CHLORIDE, SODIUM LACTATE, POTASSIUM CHLORIDE, CALCIUM CHLORIDE 600; 310; 30; 20 MG/100ML; MG/100ML; MG/100ML; MG/100ML
INJECTION, SOLUTION INTRAVENOUS CONTINUOUS
Status: DISCONTINUED | OUTPATIENT
Start: 2025-01-06 | End: 2025-01-07

## 2025-01-06 RX ORDER — OXYTOCIN/0.9 % SODIUM CHLORIDE 15/250 ML
0-32 PLASTIC BAG, INJECTION (ML) INTRAVENOUS CONTINUOUS
Status: DISCONTINUED | OUTPATIENT
Start: 2025-01-06 | End: 2025-01-07 | Stop reason: HOSPADM

## 2025-01-06 RX ORDER — ROPIVACAINE HYDROCHLORIDE 2 MG/ML
12 INJECTION, SOLUTION EPIDURAL; INFILTRATION CONTINUOUS
Status: DISCONTINUED | OUTPATIENT
Start: 2025-01-06 | End: 2025-01-07 | Stop reason: HOSPADM

## 2025-01-06 RX ORDER — PHENYLEPHRINE HYDROCHLORIDE 10 MG/ML
INJECTION INTRAVENOUS
Status: DISCONTINUED | OUTPATIENT
Start: 2025-01-06 | End: 2025-01-06

## 2025-01-06 RX ORDER — MUPIROCIN 20 MG/G
OINTMENT TOPICAL
Status: DISCONTINUED | OUTPATIENT
Start: 2025-01-06 | End: 2025-01-07 | Stop reason: HOSPADM

## 2025-01-06 RX ORDER — ROPIVACAINE HYDROCHLORIDE 2 MG/ML
INJECTION, SOLUTION EPIDURAL; INFILTRATION CONTINUOUS PRN
Status: DISCONTINUED | OUTPATIENT
Start: 2025-01-06 | End: 2025-01-06

## 2025-01-06 RX ORDER — PRENATAL WITH FERROUS FUM AND FOLIC ACID 3080; 920; 120; 400; 22; 1.84; 3; 20; 10; 1; 12; 200; 27; 25; 2 [IU]/1; [IU]/1; MG/1; [IU]/1; MG/1; MG/1; MG/1; MG/1; MG/1; MG/1; UG/1; MG/1; MG/1; MG/1; MG/1
1 TABLET ORAL DAILY
Status: DISCONTINUED | OUTPATIENT
Start: 2025-01-06 | End: 2025-01-07 | Stop reason: HOSPADM

## 2025-01-06 RX ORDER — CEFAZOLIN 2 G/1
2 INJECTION, POWDER, FOR SOLUTION INTRAMUSCULAR; INTRAVENOUS ONCE AS NEEDED
Status: DISCONTINUED | OUTPATIENT
Start: 2025-01-06 | End: 2025-01-07 | Stop reason: HOSPADM

## 2025-01-06 RX ORDER — OXYTOCIN/0.9 % SODIUM CHLORIDE 15/250 ML
95 PLASTIC BAG, INJECTION (ML) INTRAVENOUS CONTINUOUS PRN
Status: DISCONTINUED | OUTPATIENT
Start: 2025-01-06 | End: 2025-01-07 | Stop reason: HOSPADM

## 2025-01-06 RX ORDER — DIPHENOXYLATE HYDROCHLORIDE AND ATROPINE SULFATE 2.5; .025 MG/1; MG/1
2 TABLET ORAL EVERY 6 HOURS PRN
Status: DISCONTINUED | OUTPATIENT
Start: 2025-01-06 | End: 2025-01-07 | Stop reason: HOSPADM

## 2025-01-06 RX ORDER — BUTORPHANOL TARTRATE 2 MG/ML
2 INJECTION INTRAMUSCULAR; INTRAVENOUS
Status: DISCONTINUED | OUTPATIENT
Start: 2025-01-06 | End: 2025-01-07 | Stop reason: HOSPADM

## 2025-01-06 RX ORDER — HYDROMORPHONE HYDROCHLORIDE 1 MG/ML
1 INJECTION, SOLUTION INTRAMUSCULAR; INTRAVENOUS; SUBCUTANEOUS EVERY 6 HOURS PRN
Status: DISCONTINUED | OUTPATIENT
Start: 2025-01-06 | End: 2025-01-07 | Stop reason: HOSPADM

## 2025-01-06 RX ORDER — OXYTOCIN 10 [USP'U]/ML
10 INJECTION, SOLUTION INTRAMUSCULAR; INTRAVENOUS ONCE AS NEEDED
Status: DISCONTINUED | OUTPATIENT
Start: 2025-01-06 | End: 2025-01-07 | Stop reason: HOSPADM

## 2025-01-06 RX ORDER — CLINDAMYCIN PHOSPHATE 900 MG/50ML
900 INJECTION, SOLUTION INTRAVENOUS
Status: DISCONTINUED | OUTPATIENT
Start: 2025-01-06 | End: 2025-01-07

## 2025-01-06 RX ORDER — IBUPROFEN 600 MG/1
600 TABLET ORAL EVERY 6 HOURS PRN
Status: DISCONTINUED | OUTPATIENT
Start: 2025-01-06 | End: 2025-01-07 | Stop reason: HOSPADM

## 2025-01-06 RX ORDER — MISOPROSTOL 100 UG/1
800 TABLET ORAL ONCE AS NEEDED
Status: DISCONTINUED | OUTPATIENT
Start: 2025-01-06 | End: 2025-01-07 | Stop reason: HOSPADM

## 2025-01-06 RX ORDER — OXYTOCIN-SODIUM CHLORIDE 0.9% IV SOLN 30 UNIT/500ML 30-0.9/5 UT/ML-%
10 SOLUTION INTRAVENOUS ONCE AS NEEDED
Status: DISCONTINUED | OUTPATIENT
Start: 2025-01-06 | End: 2025-01-07 | Stop reason: HOSPADM

## 2025-01-06 RX ORDER — ROPIVACAINE HYDROCHLORIDE 2 MG/ML
INJECTION, SOLUTION EPIDURAL; INFILTRATION; PERINEURAL
Status: COMPLETED
Start: 2025-01-06 | End: 2025-01-06

## 2025-01-06 RX ORDER — BUTORPHANOL TARTRATE 2 MG/ML
1 INJECTION INTRAMUSCULAR; INTRAVENOUS
Status: DISCONTINUED | OUTPATIENT
Start: 2025-01-06 | End: 2025-01-07 | Stop reason: HOSPADM

## 2025-01-06 RX ORDER — TERBUTALINE SULFATE 1 MG/ML
0.25 INJECTION SUBCUTANEOUS
Status: DISCONTINUED | OUTPATIENT
Start: 2025-01-06 | End: 2025-01-07 | Stop reason: HOSPADM

## 2025-01-06 RX ORDER — SODIUM CHLORIDE 0.9 % (FLUSH) 0.9 %
3 SYRINGE (ML) INJECTION
Status: DISCONTINUED | OUTPATIENT
Start: 2025-01-06 | End: 2025-01-07 | Stop reason: HOSPADM

## 2025-01-06 RX ORDER — SODIUM CHLORIDE 9 MG/ML
INJECTION, SOLUTION INTRAVENOUS
Status: DISCONTINUED | OUTPATIENT
Start: 2025-01-06 | End: 2025-01-07 | Stop reason: HOSPADM

## 2025-01-06 RX ORDER — LANOLIN ALCOHOL/MO/W.PET/CERES
1 CREAM (GRAM) TOPICAL DAILY
Status: DISCONTINUED | OUTPATIENT
Start: 2025-01-06 | End: 2025-01-07 | Stop reason: HOSPADM

## 2025-01-06 RX ORDER — SIMETHICONE 80 MG
1 TABLET,CHEWABLE ORAL 4 TIMES DAILY PRN
Status: DISCONTINUED | OUTPATIENT
Start: 2025-01-06 | End: 2025-01-07 | Stop reason: HOSPADM

## 2025-01-06 RX ORDER — SIMETHICONE 80 MG
1 TABLET,CHEWABLE ORAL EVERY 6 HOURS PRN
Status: DISCONTINUED | OUTPATIENT
Start: 2025-01-06 | End: 2025-01-07 | Stop reason: HOSPADM

## 2025-01-06 RX ORDER — CALCIUM CARBONATE 200(500)MG
500 TABLET,CHEWABLE ORAL 3 TIMES DAILY PRN
Status: DISCONTINUED | OUTPATIENT
Start: 2025-01-06 | End: 2025-01-07 | Stop reason: HOSPADM

## 2025-01-06 RX ORDER — DOCUSATE SODIUM 100 MG/1
200 CAPSULE, LIQUID FILLED ORAL 2 TIMES DAILY PRN
Status: DISCONTINUED | OUTPATIENT
Start: 2025-01-06 | End: 2025-01-07 | Stop reason: HOSPADM

## 2025-01-06 RX ADMIN — LIDOCAINE HYDROCHLORIDE 2 ML: 10 INJECTION, SOLUTION INFILTRATION; PERINEURAL at 08:01

## 2025-01-06 RX ADMIN — IBUPROFEN 600 MG: 600 TABLET, FILM COATED ORAL at 09:01

## 2025-01-06 RX ADMIN — SODIUM CHLORIDE, POTASSIUM CHLORIDE, SODIUM LACTATE AND CALCIUM CHLORIDE: 600; 310; 30; 20 INJECTION, SOLUTION INTRAVENOUS at 08:01

## 2025-01-06 RX ADMIN — Medication 4 MILLI-UNITS/MIN: at 12:01

## 2025-01-06 RX ADMIN — ROPIVACAINE HYDROCHLORIDE 12 ML/HR: 2 INJECTION, SOLUTION EPIDURAL; INFILTRATION at 08:01

## 2025-01-06 RX ADMIN — SODIUM CHLORIDE, POTASSIUM CHLORIDE, SODIUM LACTATE AND CALCIUM CHLORIDE: 600; 310; 30; 20 INJECTION, SOLUTION INTRAVENOUS at 01:01

## 2025-01-06 RX ADMIN — BENZOCAINE AND LEVOMENTHOL: 200; 5 SPRAY TOPICAL at 09:01

## 2025-01-06 RX ADMIN — ROPIVACAINE HYDROCHLORIDE 4 ML: 2 INJECTION, SOLUTION EPIDURAL; INFILTRATION at 08:01

## 2025-01-06 RX ADMIN — SODIUM CHLORIDE, POTASSIUM CHLORIDE, SODIUM LACTATE AND CALCIUM CHLORIDE: 600; 310; 30; 20 INJECTION, SOLUTION INTRAVENOUS at 12:01

## 2025-01-06 RX ADMIN — PHENYLEPHRINE HYDROCHLORIDE 100 MCG: 10 INJECTION, SOLUTION INTRAVENOUS at 09:01

## 2025-01-06 RX ADMIN — LIDOCAINE HYDROCHLORIDE AND EPINEPHRINE 3 ML: 15; 5 INJECTION, SOLUTION EPIDURAL at 08:01

## 2025-01-06 NOTE — ANESTHESIA POSTPROCEDURE EVALUATION
Anesthesia Post Evaluation    Patient: Robyn Guerrero    Procedure(s) Performed: * No procedures listed *    Final Anesthesia Type: epidural      Patient location during evaluation: labor & delivery  Patient participation: Yes- Able to Participate  Level of consciousness: awake and alert and oriented  Post-procedure vital signs: reviewed and stable  Pain management: adequate  Airway patency: patent    PONV status at discharge: No PONV  Anesthetic complications: no      Cardiovascular status: hemodynamically stable  Respiratory status: unassisted  Hydration status: euvolemic  Follow-up not needed.              Vitals Value Taken Time   /77 01/06/25 1508   Temp 36.9 °C (98.4 °F) 01/06/25 1220   Pulse 113 01/06/25 1508   Resp 18 01/06/25 0522   SpO2 88 % 01/06/25 1445   Vitals shown include unfiled device data.      No case tracking events are documented in the log.      Pain/Deja Score: No data recorded

## 2025-01-06 NOTE — INTERVAL H&P NOTE
"The patient has been examined and the H&P has been reviewed:    I concur with the findings and changes have been noted since the H&P was written:    24 y.o. female  at 39w0d presented elective induction per maternal request.  She received Cytotec overnight.  Membranes were ruptured artificially this morning.  She has had reactive fetal testing.  She received epidural anesthesia.      /73   Pulse 105   Temp 98.4 °F (36.9 °C)   Resp 18   Ht 5' 2" (1.575 m)   Wt 63.7 kg (140 lb 6.9 oz)   LMP 2024 (Exact Date)   SpO2 99%   Breastfeeding No   BMI 25.69 kg/m²   Gen: NAD  Abd: Gravid, NT  Ext: no CCE  Pelvis: NEFG  Cvx: 4-5/80/-2  AROM with clear fluid noted at 0730 AM    Nst rx, cat 1  [place]: CTX q 2-3 min         Active Hospital Problems    Diagnosis  POA    *Low maternal weight gain, third trimester [O26.13]  Yes    Hemorrhoids during pregnancy in third trimester [O22.43]  Yes    POTS (postural orthostatic tachycardia syndrome) [G90.A]  Yes    Anxiety during pregnancy [O99.340, F41.9]  Yes      Resolved Hospital Problems   No resolved problems to display.     Admit for labor induction   Continuous maternal and fetal monitoring  S/p AROM  Pitocin per protocol as needed  IV fluids  Anticipate vaginal delivery    "

## 2025-01-06 NOTE — ANESTHESIA PROCEDURE NOTES
Epidural    Patient location during procedure: OB  Block not for primary anesthetic.  Reason for block: labor analgesia requested by patient and obstetrician   Start time: 1/6/2025 8:40 AM  Timeout: 1/6/2025 8:30 AM    Staffing  Performing Provider: Eulalia Zambrano CRNA  Authorizing Provider: Eulalia Zambrano CRNA    Staffing  Performed by: Eulalia Zambrano, BRITT  Authorized by: Eulalia Zambrano CRNA        Preanesthetic Checklist  Completed: patient identified, IV checked, site marked, risks and benefits discussed, surgical consent, monitors and equipment checked, pre-op evaluation, timeout performed, anesthesia consent given, hand hygiene performed and patient being monitored  Preparation  Patient position: sitting  Prep: DuraPrep  Patient monitoring: Pulse Ox Block not for primary anesthetic.  Epidural  Skin Anesthetic: lidocaine 1%  Skin Wheal: 2 mL  Administration type: single shot  Approach: midline  Interspace: L3-4    Injection technique: CHRISTINE air  Needle and Epidural Catheter  Needle type: Tuohy   Needle gauge: 18  Needle length: 3.5 inches  Insertion Attempts: 2  Test dose: 3 mL of lidocaine 1.5% with Epi 1-to-200,000  Additional Documentation: no paresthesia on injection, no significant pain on injection, negative aspiration for heme and CSF, no signs/symptoms of IV or SA injection and no significant complaints from patient  Needle localization: anatomical landmarks  Assessment  Ease of block: moderate  Patient's tolerance of the procedure: comfortable throughout block and no complaints  Additional Notes  Epidural space entered at L2-3 after moderate difficulty (space felt typical but had difficulty getting past bone to epidural space), paresthesia on L with threading cath. Needle and cath withdrawn and second attempt epidural placed at L3-4 without incident. No inadvertent dural puncture with Tuohy.  Dural puncture not performed with spinal needle

## 2025-01-06 NOTE — L&D DELIVERY NOTE
[facility]-Labor [facility] Delivery  Vaginal Delivery   Operative Note    SUMMARY   24 y.o. female  at 39w0d presented for elective induction per maternal request.  She received Cytotec initially.  Membranes were ruptured artificially around 7:30 a.m..  She received epidural anesthesia.  She progressed to about 7 cm then slowed so Pitocin was initiated.  Fetal testing was reactive.  She reached complete dilation by about 1400.  She was placed in stirrups prepped and draped in quickly pushed to delivery of a viable female infant from vertex left occiput anterior presentation without complications.  Section of cord was obtained for cord gases.  Placenta was delivered spontaneously intact and handed off.  Uterus was massaged and Pitocin was started to control bleeding.  Total blood loss was about 150 cc.  A second-degree perineal laceration was repaired with 0 Vicryl and 3-0 Vicryl suture with good hemostasis.  Three-vessel cord was noted.  Apgars 9/9.  Patient tolerated delivery well. Sponge needle and lap counted correctly x2.    Indications: Low maternal weight gain, third trimester  Pregnancy complicated by:   Patient Active Problem List   Diagnosis    Anxiety disorder    Sinus tachycardia by electrocardiography    Anxiety during pregnancy    Vacuum extractor delivery, delivered    COVID-19    Tremor    POTS (postural orthostatic tachycardia syndrome)    History of gestational diabetes in prior pregnancy, currently pregnant in third trimester    Currently pregnant    Hemorrhoids during pregnancy in third trimester    Low maternal weight gain, third trimester     Admitting GA: 39w0d    Delivery Information for Shanhaz Guerrero    Birth information:  YOB: 2025   Time of birth: 2:37 PM   Sex: female   Head Delivery Date/Time: 2025  2:37 PM   Delivery type: Vaginal, Spontaneous   Gestational Age: 39w0d       Delivery Providers    Delivering clinician: Dk Garrett MD            Measurements    Weight:   Length:          Apgars    Living status: Living  Apgar Component Scores:  1 min.:  5 min.:  10 min.:  15 min.:  20 min.:    Skin color:         Heart rate:         Reflex irritability:         Muscle tone:         Respiratory effort:         Total:                  Operative Delivery    Forceps attempted?: No  Vacuum extractor attempted?: No         Shoulder Dystocia    Shoulder dystocia present?: No           Presentation    Presentation: Vertex  Position: Left Occiput Anterior           Interventions/Resuscitation           Cord    Vessels: 3 vessels  Complications: None  Delayed Cord Clamping?: Yes  Cord Clamped Date/Time: 2025  2:38 PM  Gases Sent?: No       Placenta    Placenta delivery date/time: 2025 1439  Placenta removal: Spontaneous  Placenta appearance: Intact  Placenta disposition: Discarded           Labor Events:       labor: No     Labor Onset Date/Time: 2025 00:00     Dilation Complete Date/Time: 2025 14:00     Start Pushing Date/Time: 2025 14:10     Rupture Date/Time: 25 0720        Rupture type: ARM (Artificial Rupture)        Fluid Amount:       Fluid Color: Clear      steroids: None     Antibiotics given for GBS: No     Induction: misoprostol     Indications for induction:  Elective     Augmentation: amniotomy;oxytocin     Indications for augmentation: Ineffective Contraction Pattern     Labor complications: None     Additional complications:          Cervical ripening:                     Delivery:      Episiotomy: None     Indication for Episiotomy:       Perineal Lacerations: 2nd Repaired:      Periurethral Laceration:   Repaired:     Labial Laceration:   Repaired:     Sulcus Laceration:   Repaired:     Vaginal Laceration:   Repaired:     Cervical Laceration:   Repaired:     Repair suture:       Repair # of packets: 2     Last Value - EBL - Nursing (mL):       Sum - EBL - Nursing (mL): 0     Last Value - EBL -  Anesthesia (mL):      Calculated QBL (mL):       Running total QBL (mL):       Vaginal Sweep Performed: Yes     Surgicount Correct: Yes       Other providers:       Anesthesia    Method: Epidural          Details (if applicable):  Trial of Labor      Categorization:      Priority:     Indications for :     Incision Type:       Additional  information:  Forceps:    Vacuum:    Breech:    Observed anomalies    Other (Comments):

## 2025-01-06 NOTE — ANESTHESIA PREPROCEDURE EVALUATION
01/06/2025  Robyn Guerrero is a 24 y.o., female.      Pre-op Assessment    I have reviewed the Patient Summary Reports.     I have reviewed the Nursing Notes. I have reviewed the NPO Status.   I have reviewed the Medications.     Review of Systems  Anesthesia Hx:  No problems with previous Anesthesia             Denies Family Hx of Anesthesia complications.    Denies Personal Hx of Anesthesia complications.                    Social:  Non-Smoker       Hematology/Oncology:  Hematology Normal   Oncology Normal                                   EENT/Dental:  EENT/Dental Normal           Cardiovascular:  Cardiovascular Normal Exercise tolerance: good                  POTS                           Pulmonary:  Pulmonary Normal                       Renal/:  Renal/ Normal                 Hepatic/GI:  Hepatic/GI Normal                    Musculoskeletal:  Musculoskeletal Normal                Neurological:  Neurology Normal                                      Endocrine:  Diabetes, gestational           Dermatological:  Skin Normal    Psych:  Denies Psychiatric History. anxiety                 Physical Exam  General: Well nourished, Cooperative, Alert and Oriented    Airway:  Mallampati: II / II  Mouth Opening: Normal  TM Distance: Normal  Tongue: Normal  Neck ROM: Normal ROM    Dental:  Intact        Anesthesia Plan  Type of Anesthesia, risks & benefits discussed:    Anesthesia Type: Epidural  Intra-op Monitoring Plan: Standard ASA Monitors  Post Op Pain Control Plan: epidural analgesia  Informed Consent: Informed consent signed with the Patient and all parties understand the risks and agree with anesthesia plan.  All questions answered. Patient consented to blood products? Yes  ASA Score: 2  Day of Surgery Review of History & Physical: H&P Update referred to the surgeon/provider.I have interviewed and examined  the patient. I have reviewed the patient's H&P dated: There are no significant changes.   Anesthesia Plan Notes: Pt has history of scoliosis - was dx'd when young and saw chiro around age 14.States her last epidural was one-sided. No obvious deformity upon inspection/palpation of spine    Ready For Surgery From Anesthesia Perspective.     .

## 2025-01-07 ENCOUNTER — ANESTHESIA EVENT (OUTPATIENT)
Dept: OBSTETRICS AND GYNECOLOGY | Facility: HOSPITAL | Age: 25
End: 2025-01-07
Payer: MEDICAID

## 2025-01-07 ENCOUNTER — ANESTHESIA (OUTPATIENT)
Dept: OBSTETRICS AND GYNECOLOGY | Facility: HOSPITAL | Age: 25
End: 2025-01-07
Payer: MEDICAID

## 2025-01-07 VITALS
RESPIRATION RATE: 18 BRPM | HEIGHT: 62 IN | OXYGEN SATURATION: 88 % | HEART RATE: 103 BPM | BODY MASS INDEX: 25.84 KG/M2 | SYSTOLIC BLOOD PRESSURE: 114 MMHG | DIASTOLIC BLOOD PRESSURE: 66 MMHG | WEIGHT: 140.44 LBS | TEMPERATURE: 98 F

## 2025-01-07 PROBLEM — D62 ACUTE BLOOD LOSS ANEMIA: Status: ACTIVE | Noted: 2025-01-07

## 2025-01-07 LAB
BASOPHILS # BLD AUTO: 0.04 X10(3)/MCL (ref 0.01–0.08)
BASOPHILS NFR BLD AUTO: 0.3 % (ref 0.1–1.2)
EOSINOPHIL # BLD AUTO: 0.09 X10(3)/MCL (ref 0.04–0.36)
EOSINOPHIL NFR BLD AUTO: 0.8 % (ref 0.7–7)
ERYTHROCYTE [DISTWIDTH] IN BLOOD BY AUTOMATED COUNT: 13.8 % (ref 11–14.5)
HCT VFR BLD AUTO: 28.8 % (ref 36–48)
HGB BLD-MCNC: 9.7 G/DL (ref 11.8–16)
IMM GRANULOCYTES # BLD AUTO: 0.04 X10(3)/MCL (ref 0–0.03)
IMM GRANULOCYTES NFR BLD AUTO: 0.3 % (ref 0–0.5)
LYMPHOCYTES # BLD AUTO: 2.41 X10(3)/MCL (ref 1.16–3.74)
LYMPHOCYTES NFR BLD AUTO: 21 % (ref 20–55)
MCH RBC QN AUTO: 29.6 PG (ref 27–34)
MCHC RBC AUTO-ENTMCNC: 33.7 G/DL (ref 31–37)
MCV RBC AUTO: 87.8 FL (ref 79–99)
MONOCYTES # BLD AUTO: 0.58 X10(3)/MCL (ref 0.24–0.36)
MONOCYTES NFR BLD AUTO: 5.1 % (ref 4.7–12.5)
NEUTROPHILS # BLD AUTO: 8.32 X10(3)/MCL (ref 1.56–6.13)
NEUTROPHILS NFR BLD AUTO: 72.5 % (ref 37–73)
NRBC BLD AUTO-RTO: 0 %
PLATELET # BLD AUTO: 154 X10(3)/MCL (ref 140–371)
PMV BLD AUTO: 10.2 FL (ref 9.4–12.4)
RBC # BLD AUTO: 3.28 X10(6)/MCL (ref 4–5.1)
WBC # BLD AUTO: 11.48 X10(3)/MCL (ref 4–11.5)

## 2025-01-07 PROCEDURE — 85025 COMPLETE CBC W/AUTO DIFF WBC: CPT | Performed by: OBSTETRICS & GYNECOLOGY

## 2025-01-07 PROCEDURE — 25000003 PHARM REV CODE 250: Performed by: OBSTETRICS & GYNECOLOGY

## 2025-01-07 PROCEDURE — 36415 COLL VENOUS BLD VENIPUNCTURE: CPT | Performed by: OBSTETRICS & GYNECOLOGY

## 2025-01-07 RX ORDER — TRIPROLIDINE/PSEUDOEPHEDRINE 2.5MG-60MG
30 TABLET ORAL EVERY 6 HOURS PRN
Qty: 480 ML | Refills: 2 | Status: SHIPPED | OUTPATIENT
Start: 2025-01-07 | End: 2025-01-07 | Stop reason: HOSPADM

## 2025-01-07 RX ORDER — IRON,CARBONYL/ASCORBIC ACID 100-250 MG
1 TABLET ORAL DAILY
Qty: 30 TABLET | Refills: 2 | Status: SHIPPED | OUTPATIENT
Start: 2025-01-07

## 2025-01-07 RX ORDER — IRON,CARBONYL/ASCORBIC ACID 100-250 MG
1 TABLET ORAL DAILY
Qty: 30 TABLET | Refills: 2 | Status: SHIPPED | OUTPATIENT
Start: 2025-01-07 | End: 2025-01-07

## 2025-01-07 RX ORDER — TRIPROLIDINE/PSEUDOEPHEDRINE 2.5MG-60MG
30 TABLET ORAL EVERY 6 HOURS PRN
Qty: 480 ML | Refills: 0 | Status: SHIPPED | OUTPATIENT
Start: 2025-01-07

## 2025-01-07 RX ADMIN — IBUPROFEN 600 MG: 600 TABLET, FILM COATED ORAL at 07:01

## 2025-01-07 RX ADMIN — PRENATAL VITAMINS-IRON FUMARATE 27 MG IRON-FOLIC ACID 0.8 MG TABLET 1 TABLET: at 08:01

## 2025-01-07 RX ADMIN — FERROUS SULFATE TAB 325 MG (65 MG ELEMENTAL FE) 1 EACH: 325 (65 FE) TAB at 08:01

## 2025-01-07 NOTE — ANESTHESIA POSTPROCEDURE EVALUATION
Anesthesia Post Evaluation    Patient: Robyn Guerrero    Procedure(s) Performed: * No procedures listed *    Final Anesthesia Type: epidural      Patient location during evaluation: labor & delivery  Patient participation: Yes- Able to Participate  Level of consciousness: awake and alert and oriented  Post-procedure vital signs: reviewed and stable  Pain management: adequate  Airway patency: patent    PONV status at discharge: No PONV  Anesthetic complications: no      Cardiovascular status: hemodynamically stable  Respiratory status: unassisted  Hydration status: euvolemic  Follow-up not needed.              Vitals Value Taken Time   /66 01/07/25 0800   Temp 36.8 °C (98.3 °F) 01/07/25 0800   Pulse 101 01/07/25 0800   Resp 18 01/07/25 0400   SpO2 88 % 01/06/25 1447         No case tracking events are documented in the log.      Pain/Deja Score: Pain Rating Prior to Med Admin: 5 (1/7/2025  7:35 AM)

## 2025-01-07 NOTE — DISCHARGE SUMMARY
[facility]  [facility] Summary      Patient Name: Robyn Guerrero  MRN: [id number]  Admission Date: 2025  Hospital Length of Stay: 2 days  Discharge Date and Time:  2025 5:10 PM  Attending Physician: Regina Garrett MD   Discharging Provider: REGINA GARRETT MD   Primary Care Provider: Eladia Abad FNP    HPI: 24 y.o. female  at 39w0d presented induction.       Hospital Course:   The patient delivered a viable infant via spontaneous vaginal delivery on 2025.  Her postpartum course has been uneventful.  He has a anemic with a hemoglobin 9 but asymptomatic.  She is ambulating and voiding without difficulty.  Pain is controlled.  Bleeding is minimal.  She desires discharge home today.     Consults (From admission, onward)          Status Ordering Provider     Inpatient consult to Lactation  Once        Provider:  (Not yet assigned)    Ordered REGINA GARRETT     Inpatient consult to Anesthesiology  Once        Provider:  (Not yet assigned)    Acknowledged REGINA GARRETT            Final Active Diagnoses:    Diagnosis Date Noted POA    PRINCIPAL PROBLEM:  Normal vaginal delivery [O80] 2025 Not Applicable    Acute blood loss anemia [D62] 2025 No    Low maternal weight gain, third trimester [O26.13] 2024 Yes    Hemorrhoids during pregnancy in third trimester [O22.43] 2024 Yes    POTS (postural orthostatic tachycardia syndrome) [G90.A] 2024 Yes    Anxiety during pregnancy [O99.340, F41.9] 2023 Yes      Problems Resolved During this Admission:        Significant Diagnostic Studies: Labs: CMP   Recent Labs   Lab 25  0022   *   K 3.9      CO2 23   BUN 9   CREATININE 0.71   CALCIUM 8.7   ALBUMIN 3.4   BILITOT 0.5   ALKPHOS 177*   AST 26   ALT 13   , CBC   Recent Labs   Lab 25  0022 25  2158 25  0541   WBC 11.11  --  11.48   HGB 11.1* 10.8* 9.7*   HCT 32.3* 32.3* 28.8*     --  154   , and All labs  "within the past 24 hours have been reviewed      Feeding Method: breast    Immunizations       Date Immunization Status Dose Route/Site Given by    25 1548 MMR Incomplete 0.5 mL Subcutaneous/     25 1548 Tdap Incomplete 0.5 mL Intramuscular/             Delivery:    Episiotomy: None   Lacerations: 2nd   Repair suture:     Repair # of packets: 2   Blood loss (ml):       Birth information:  YOB: 2025   Time of birth: 2:37 PM   Sex: female   Delivery type: Vaginal, Spontaneous   Gestational Age: 39w0d     Measurements    Weight: 3250 g  Weight (lbs): 7 lb 2.6 oz  Length: 533.4 cm  Length (in): 210"  Head circumference: 34.3 cm         Delivery Clinician: Delivery Providers    Delivering clinician: Dk Garrett MD   Provider Role    Benita Nino RN Registered Nurse    Lala Lamb RN Registered Nurse             Additional  information:  Forceps:    Vacuum:    Breech:    Observed anomalies      Living?:     Apgars    Living status: Living  Apgar Component Scores:  1 min.:  5 min.:  10 min.:  15 min.:  20 min.:    Skin color:  1  1       Heart rate:  2  2       Reflex irritability:  2  2       Muscle tone:  2  2       Respiratory effort:  2  2       Total:  9  9       Apgars assigned by: TRAVON LAMB RN         Placenta: Delivered:       appearance  Pending Diagnostic Studies:       None            Discharged Condition: good    Disposition: Home or Self Care    Follow Up:   Follow-up Information       Dk Garrett MD .    Specialty: Obstetrics and Gynecology  Contact information:  [address] [place] [id number] [phone]                           Patient Instructions:   No discharge procedures on file.  Medications:  Current Discharge Medication List        START taking these medications    Details   ibuprofen 20 mg/mL oral liquid Take 30 mLs (600 mg total) by mouth every 6 (six) hours as needed for Pain.  Qty: 480 mL, Refills: 2      iron-vitamin C 100-250 mg, ICAR-C, (ICAR-C) " 100-250 mg Tab Take 1 tablet by mouth once daily.  Qty: 30 tablet, Refills: 2           CONTINUE these medications which have NOT CHANGED    Details   prenatal vit/iron fum/folic ac (RIGHT STEP PRENATAL VITAMINS ORAL) Take by mouth.           No lifting more than 20 lbs and no intercourse for 6 weeks.  Sitz baths BID.  Pain, fever, bleeding, pre-eclampsia, thromboembolism, and postpartum depression precautions are given.     REGINA MAZARIEGOS MD  Obstetrics  [facility]

## 2025-01-07 NOTE — PROGRESS NOTES
PostPartum Progress Note        Subjective:      Post-Partum Day #1 after uncomplicated vaginal delivery after elective induction.    Patient is without complaints. Lochia decreasing. Breast feeding. Pain is well controlled. Patient is ambulating. Tolerating Full Regular diet. Overall mother and baby are doing well.     Objective:      Temp:  [97.6 °F (36.4 °C)-98.4 °F (36.9 °C)] 98.1 °F (36.7 °C)  Pulse:  [0-142] 78  Resp:  [18] 18  SpO2:  [88 %-100 %] 88 %  BP: ()/(48-94) 103/65    Intake/Output Summary (Last 24 hours) at 2025 0809  Last data filed at 2025 1625  Gross per 24 hour   Intake --   Output 650 ml   Net -650 ml     Body mass index is 25.69 kg/m².    General: no acute distress  Abdomen: soft, non-tender, non-distended; Fundus firm and at the umbilicus    Extremities: non-tender, symmetric, trace edema    Lab Results   Component Value Date/Time    GROUPTRH B POS 2025 12:22 AM    GROUPTRH B POS 2022 12:00 AM    ABORH B POS 2024 01:23 PM    ABSCREEN NEG 2024 01:23 PM     Recent Results (from the past 2 weeks)   CBC with Differential    Collection Time: 25  5:41 AM   Result Value Ref Range    WBC 11.48 4.00 - 11.50 x10(3)/mcL    Hgb 9.7 (L) 11.8 - 16.0 g/dL    Hct 28.8 (L) 36.0 - 48.0 %    Platelet 154 140 - 371 x10(3)/mcL   CBC with Differential    Collection Time: 25 12:22 AM   Result Value Ref Range    WBC 11.11 4.00 - 11.50 x10(3)/mcL    Hgb 11.1 (L) 11.8 - 16.0 g/dL    Hct 32.3 (L) 36.0 - 48.0 %    Platelet 176 140 - 371 x10(3)/mcL          Assessment:     24 y.o.  S/P  Post-Partum Day #1; anemia; vaginal laceration  - Doing Well      Plan:     1. Continue routine postpartum care  2. Plan for D/C in AM  3. Breast Feeding encouraged, lactation consult  4. Rhogam per protocol   5. Acute blood loss anemia: EBL: 150mls; H&H this am was 9.7 & 28.8. Continue ferrous sulfate; recheck CBC if pt becomes symptomatic.  6. Vaginal laceration: routine  pericare; pain controlled with oral medication

## 2025-01-07 NOTE — HOSPITAL COURSE
The patient delivered a viable infant via spontaneous vaginal delivery on 01/06/2025.  Her postpartum course has been uneventful.  He has a anemic with a hemoglobin 9 but asymptomatic.  She is ambulating and voiding without difficulty.  Pain is controlled.  Bleeding is minimal.  She desires discharge home today.

## 2025-02-17 NOTE — PROGRESS NOTES
"Subjective:       Robyn Guerrero is a 24 y.o. female  status post  (25) presents for her 6 week postpartum visit. No complaints. Infant doing well, breast feeding. Reports previously on Lexapro for PPD. States did not like the way she felt when coming off of Lexapro. States mood is improving at this time, poor energy due to lack of sleep. Denies SI/HI, able to care for self and children.     The patient's history was reviewed and updated.      Review of Systems  General/Constitutional: Chills denies. Fatigue/weakness denies. Fever denies . Night sweats denies . Hot flashes denies  Gastrointestinal: Abdominal pain denies. Blood in stool denies. Constipation denies. Diarrhea denies. Heartburn denies. Nausea denies. Vomiting denies   Genitourinary: Incontinence denies. Blood in urine denies. Frequent urination denies.  Urgency denies. Painful urination denies. Nocturia denies   Gynecologic: Irregular menses denies.  Heavy bleeding  denies.  Painful menses denies.  Vaginal discharge denies. Vaginal odor denies. Vaginal itching/Irritation denies. Vaginal lesion denies.  Pelvic pain denies. Decreased libido denies. Vulvar lesion denies. Prolapse of genital organs denies. Painful intercourse denies. Postcoital bleeding denies   Psychiatric: Mood lability denies.  Depressed mood denies. Suicidal thoughts denies. Anxiety denies.  Overwhelmed denies.  Appetite normal. Energy level normal       Physical Exam:   /60 (BP Location: Right arm, Patient Position: Sitting)   Temp 97.2 °F (36.2 °C) (Temporal)   Ht 5' 2" (1.575 m)   Wt 56.2 kg (124 lb)   LMP 2024 (Exact Date)   Breastfeeding Yes   BMI 22.68 kg/m²      Chaperone present.     Constitutional: General appearance: healthy, well-nourished and well-developed   Psychiatric: Orientation to time, place and person. Normal mood and affect and        active, alert   Breast: Inspection/palpation: no tenderness, masses, skin changes or abnormal " secretions   Abdomen:  Auscultation/Inspection/Palpation: deferred   Female Genitalia:      Vulva: no masses, atrophy or lesions      Bladder/Urethra: no urethral discharge or mass, normal meatus, bladder                 non-distended.      Vagina: no tenderness, erythema, cystocele, rectocele, abnormal                vaginal discharge, or vesicle(s) or ulcers                   Cervix: no discharge or cervical motion tenderness and grossly normal      Uterus: normal size and shape and midline, non-tender, and no uterine                  prolapse.      Adnexa/Parametria: no parametrial tenderness or mass, no adnexal tenderness                 or ovarian mass.     Assessment:     1. Postpartum exam    2. Postpartum depression         Plan:   May return to normal activities  Healthy diet and exercise  Declines contraception at this time    Discussed anxiety and depression in detail.   Discussed treatment options including life style modification- healthy diet and exercise, spending time outdoors. Discussed medications and all associated risks and side effects of SSRI's. Discussed counseling. Pt desires lifestyle modifications. Will call office if desires medication. Strict precautions.     RTC prn/annual     This note was transcribed by Geena Weber. There may be transcription errors as a result, however minimal. Effort has been made to ensure accuracy of transcription, but any obvious errors or omissions should be clarified with the author of the document.       I agree with the above documentation.

## 2025-02-20 ENCOUNTER — POSTPARTUM VISIT (OUTPATIENT)
Dept: OBSTETRICS AND GYNECOLOGY | Facility: CLINIC | Age: 25
End: 2025-02-20
Payer: MEDICAID

## 2025-02-20 VITALS
BODY MASS INDEX: 22.82 KG/M2 | TEMPERATURE: 97 F | HEIGHT: 62 IN | WEIGHT: 124 LBS | SYSTOLIC BLOOD PRESSURE: 102 MMHG | DIASTOLIC BLOOD PRESSURE: 60 MMHG

## 2025-02-20 PROBLEM — O99.340 ANXIETY DURING PREGNANCY: Status: RESOLVED | Noted: 2023-03-06 | Resolved: 2025-02-20

## 2025-02-20 PROBLEM — O26.13 LOW MATERNAL WEIGHT GAIN, THIRD TRIMESTER: Status: RESOLVED | Noted: 2024-12-16 | Resolved: 2025-02-20

## 2025-02-20 PROBLEM — O09.293 HISTORY OF GESTATIONAL DIABETES IN PRIOR PREGNANCY, CURRENTLY PREGNANT IN THIRD TRIMESTER: Status: RESOLVED | Noted: 2024-05-27 | Resolved: 2025-02-20

## 2025-02-20 PROBLEM — Z34.90 CURRENTLY PREGNANT: Status: RESOLVED | Noted: 2024-07-16 | Resolved: 2025-02-20

## 2025-02-20 PROBLEM — Z86.32 HISTORY OF GESTATIONAL DIABETES IN PRIOR PREGNANCY, CURRENTLY PREGNANT IN THIRD TRIMESTER: Status: RESOLVED | Noted: 2024-05-27 | Resolved: 2025-02-20

## 2025-02-20 PROBLEM — F41.9 ANXIETY DURING PREGNANCY: Status: RESOLVED | Noted: 2023-03-06 | Resolved: 2025-02-20

## 2025-02-20 PROBLEM — O22.43 HEMORRHOIDS DURING PREGNANCY IN THIRD TRIMESTER: Status: RESOLVED | Noted: 2024-07-29 | Resolved: 2025-02-20
